# Patient Record
Sex: MALE | Race: WHITE | NOT HISPANIC OR LATINO | Employment: FULL TIME | URBAN - NONMETROPOLITAN AREA
[De-identification: names, ages, dates, MRNs, and addresses within clinical notes are randomized per-mention and may not be internally consistent; named-entity substitution may affect disease eponyms.]

---

## 2019-09-17 ENCOUNTER — OFFICE VISIT CONVERTED (OUTPATIENT)
Dept: CARDIOLOGY | Facility: CLINIC | Age: 59
End: 2019-09-17
Attending: PSYCHIATRY & NEUROLOGY

## 2019-10-15 ENCOUNTER — CONVERSION ENCOUNTER (OUTPATIENT)
Dept: NEUROLOGY | Facility: CLINIC | Age: 59
End: 2019-10-15

## 2019-10-15 ENCOUNTER — OFFICE VISIT CONVERTED (OUTPATIENT)
Dept: NEUROLOGY | Facility: CLINIC | Age: 59
End: 2019-10-15
Attending: PSYCHIATRY & NEUROLOGY

## 2021-05-15 VITALS
BODY MASS INDEX: 32.2 KG/M2 | HEIGHT: 75 IN | HEART RATE: 75 BPM | DIASTOLIC BLOOD PRESSURE: 80 MMHG | WEIGHT: 259 LBS | SYSTOLIC BLOOD PRESSURE: 149 MMHG

## 2021-05-15 VITALS
SYSTOLIC BLOOD PRESSURE: 152 MMHG | WEIGHT: 260 LBS | HEART RATE: 75 BPM | DIASTOLIC BLOOD PRESSURE: 80 MMHG | HEIGHT: 75 IN | BODY MASS INDEX: 32.33 KG/M2

## 2022-06-21 ENCOUNTER — HOSPITAL ENCOUNTER (EMERGENCY)
Dept: HOSPITAL 49 - FER | Age: 62
Discharge: OTHER INSTITUTION | End: 2022-06-21
Payer: COMMERCIAL

## 2022-06-21 DIAGNOSIS — F17.210: ICD-10-CM

## 2022-06-21 DIAGNOSIS — R29.704: ICD-10-CM

## 2022-06-21 DIAGNOSIS — R26.2: ICD-10-CM

## 2022-06-21 DIAGNOSIS — I63.50: Primary | ICD-10-CM

## 2022-06-21 LAB
ALBUMIN SERPL-MCNC: 3.5 G/DL (ref 3.4–5)
ALKALINE PHOSHATASE: 108 U/L (ref 46–116)
ALT SERPL-CCNC: 20 U/L (ref 16–63)
AST: 16 U/L (ref 15–37)
BASOPHIL: 0.4 % (ref 0–2)
BILIRUBIN - TOTAL: 0.4 MG/DL (ref 0.2–1)
BUN SERPL-MCNC: 21 MG/DL (ref 7–18)
BUN/CREAT RATIO (CALC): 24.1 RATIO
CHLORIDE: 102 MMOL/L (ref 98–107)
CO2 (BICARBONATE): 26 MMOL/L (ref 21–32)
CREATININE: 0.87 MG/DL (ref 0.67–1.17)
EOSINOPHIL: 2.6 % (ref 0–5)
GLOBULIN (CALCULATION): 3.1 G/DL
GLUCOSE SERPL-MCNC: 104 MG/DL (ref 74–106)
HCT: 45.4 % (ref 42–52)
HGB BLD-MCNC: 14.8 G/DL (ref 13.2–18)
LYMPHOCYTE: 20.3 % (ref 15–48)
MCH RBC QN AUTO: 28.5 PG (ref 25–31)
MCHC RBC AUTO-ENTMCNC: 32.6 G/DL (ref 32–36)
MCV: 87.3 FL (ref 78–100)
MONOCYTE: 8.5 % (ref 0–12)
MPV: 11.1 FL (ref 6–9.5)
NEUTROPHIL: 67.7 % (ref 41–80)
NRBC: 0
PLT: 134 K/UL (ref 150–400)
POTASSIUM: 4.1 MMOL/L (ref 3.5–5.1)
RBC MORPHOLOGY: NORMAL
RBC: 5.2 M/UL (ref 4.7–6)
RDW: 15.1 % (ref 11.5–14)
TOTAL PROTEIN: 6.6 G/DL (ref 6.4–8.2)
WBC: 8.1 K/UL (ref 4–10.5)

## 2022-07-18 ENCOUNTER — OFFICE VISIT (OUTPATIENT)
Dept: UROLOGY | Facility: CLINIC | Age: 62
End: 2022-07-18

## 2022-07-18 VITALS — BODY MASS INDEX: 30.14 KG/M2 | WEIGHT: 242.4 LBS | HEIGHT: 75 IN | RESPIRATION RATE: 18 BRPM

## 2022-07-18 DIAGNOSIS — R39.15 URINARY URGENCY: Primary | ICD-10-CM

## 2022-07-18 DIAGNOSIS — N52.9 ERECTILE DYSFUNCTION, UNSPECIFIED ERECTILE DYSFUNCTION TYPE: ICD-10-CM

## 2022-07-18 PROBLEM — F17.200 CURRENT SMOKER: Status: ACTIVE | Noted: 2022-01-06

## 2022-07-18 PROBLEM — R42 DIZZINESS: Status: ACTIVE | Noted: 2020-12-13

## 2022-07-18 PROBLEM — Z12.11 SCREENING FOR COLON CANCER: Status: ACTIVE | Noted: 2020-03-13

## 2022-07-18 PROBLEM — E78.5 HYPERLIPIDEMIA: Status: ACTIVE | Noted: 2022-01-06

## 2022-07-18 PROBLEM — I10 BENIGN ESSENTIAL HTN: Status: ACTIVE | Noted: 2022-07-18

## 2022-07-18 PROBLEM — I10 HYPERTENSION: Status: ACTIVE | Noted: 2022-01-06

## 2022-07-18 PROBLEM — R20.0 LEFT ARM NUMBNESS: Status: ACTIVE | Noted: 2020-12-13

## 2022-07-18 PROBLEM — E11.9 TYPE 2 DIABETES MELLITUS: Status: ACTIVE | Noted: 2022-07-18

## 2022-07-18 PROBLEM — Z91.89 AT RISK OF DISEASE: Status: ACTIVE | Noted: 2022-01-06

## 2022-07-18 LAB
BILIRUB BLD-MCNC: NEGATIVE MG/DL
CLARITY, POC: CLEAR
COLOR UR: YELLOW
EXPIRATION DATE: ABNORMAL
GLUCOSE UR STRIP-MCNC: ABNORMAL MG/DL
KETONES UR QL: NEGATIVE
LEUKOCYTE EST, POC: NEGATIVE
Lab: ABNORMAL
NITRITE UR-MCNC: NEGATIVE MG/ML
PH UR: 5 [PH] (ref 5–8)
PROT UR STRIP-MCNC: ABNORMAL MG/DL
RBC # UR STRIP: ABNORMAL /UL
SP GR UR: 1.03 (ref 1–1.03)
URINE VOLUME: 0
UROBILINOGEN UR QL: NORMAL

## 2022-07-18 PROCEDURE — 81003 URINALYSIS AUTO W/O SCOPE: CPT | Performed by: NURSE PRACTITIONER

## 2022-07-18 PROCEDURE — 99203 OFFICE O/P NEW LOW 30 MIN: CPT | Performed by: NURSE PRACTITIONER

## 2022-07-18 PROCEDURE — 51798 US URINE CAPACITY MEASURE: CPT | Performed by: NURSE PRACTITIONER

## 2022-07-18 RX ORDER — NICOTINE 21 MG/24HR
PATCH, TRANSDERMAL 24 HOURS TRANSDERMAL
COMMUNITY
Start: 2022-06-24 | End: 2023-03-17

## 2022-07-18 RX ORDER — ROSUVASTATIN CALCIUM 40 MG/1
TABLET, COATED ORAL
COMMUNITY
End: 2023-03-17

## 2022-07-18 RX ORDER — SILDENAFIL 100 MG/1
50-100 TABLET, FILM COATED ORAL
COMMUNITY
Start: 2022-01-06 | End: 2023-03-17

## 2022-07-18 RX ORDER — TICAGRELOR 90 MG/1
TABLET ORAL
COMMUNITY
Start: 2022-06-23 | End: 2023-03-17

## 2022-07-18 RX ORDER — LISINOPRIL 40 MG/1
TABLET ORAL
COMMUNITY
Start: 2022-07-01 | End: 2023-03-17

## 2022-07-18 RX ORDER — TAMSULOSIN HYDROCHLORIDE 0.4 MG/1
1 CAPSULE ORAL DAILY
Qty: 90 CAPSULE | Refills: 3 | Status: SHIPPED | OUTPATIENT
Start: 2022-07-18 | End: 2023-03-17

## 2022-07-18 RX ORDER — ATORVASTATIN CALCIUM 80 MG/1
80 TABLET, FILM COATED ORAL
COMMUNITY
Start: 2022-06-23 | End: 2023-03-17

## 2022-07-18 RX ORDER — EMPAGLIFLOZIN 25 MG/1
TABLET, FILM COATED ORAL
COMMUNITY
Start: 2022-07-01 | End: 2023-03-17

## 2022-07-18 RX ORDER — SEMAGLUTIDE 1.34 MG/ML
0.25 INJECTION, SOLUTION SUBCUTANEOUS
COMMUNITY
Start: 2022-06-22 | End: 2023-03-17

## 2022-07-18 NOTE — PROGRESS NOTES
Chief Complaint: Urinary Urgency (Pt states he urinates about 4-5 times a day with short notice.)    Subjective         History of Present Illness  Ramon Keating is a 62 y.o. male presents to [facility] to be seen for Urinary urgency.    He states that for about a year he has been with issues with urinary urgency and frequency with leakage.     He is T2 dm with good glycemic control.     Nocturia x 3-4     He will have a strong urge to go and has to really hold his urine to prevent leakage.     He has had episodes of leakage at least once a day.    He voids every 2 hours during the day.     He feels like he has to strain to empty.    Urinary stream is weak.     He also c/o ED and states sildenafil does not work to give him an erection.     He has seen [facility] for peyronie disease and urgency with frequency.     He states that he has tried tadalafil for lower urinary tract symptoms as well as ED and it did not help to alleviate either set of symptoms.    He states he has not had PSA level checked.     Objective     Past Medical History:   Diagnosis Date   • Diabetes mellitus (HCC)    • Erectile dysfunction    • Urinary urgency        Past Surgical History:   Procedure Laterality Date   • APPENDECTOMY           Current Outpatient Medications:   •  atorvastatin (LIPITOR) 80 MG tablet, 80 mg., Disp: , Rfl:   •  Brilinta 90 MG tablet tablet, , Disp: , Rfl:   •  Cyanocobalamin 500 MCG sublingual tablet, 1,000 mcg., Disp: , Rfl:   •  Jardiance 25 MG tablet tablet, , Disp: , Rfl:   •  lisinopril (PRINIVIL,ZESTRIL) 40 MG tablet, , Disp: , Rfl:   •  metFORMIN (GLUCOPHAGE) 1000 MG tablet, , Disp: , Rfl:   •  nicotine (NICODERM CQ) 14 MG/24HR patch, , Disp: , Rfl:   •  rosuvastatin (CRESTOR) 40 MG tablet, rosuvastatin 40 mg oral tablet take 1 tablet (40 mg) by oral route once daily   Active, Disp: , Rfl:   •  Semaglutide,0.25 or 0.5MG/DOS, (Ozempic, 0.25 or 0.5 MG/DOSE,) 2 MG/1.5ML solution  "pen-injector, 0.25 mg., Disp: , Rfl:   •  sildenafil (VIAGRA) 100 MG tablet, Take  mg by mouth., Disp: , Rfl:   •  tamsulosin (FLOMAX) 0.4 MG capsule 24 hr capsule, Take 1 capsule by mouth Daily for 360 days., Disp: 90 capsule, Rfl: 3    No Known Allergies     History reviewed. No pertinent family history.    Social History     Socioeconomic History   • Marital status:    Tobacco Use   • Smoking status: Former Smoker     Packs/day: 1.00     Years: 40.00     Pack years: 40.00     Start date: 1982     Quit date: 2022     Years since quittin.0   • Smokeless tobacco: Never Used   Vaping Use   • Vaping Use: Never used   Substance and Sexual Activity   • Alcohol use: Never   • Drug use: Never   • Sexual activity: Defer       Vital Signs:   Resp 18   Ht 190.5 cm (75\")   Wt 110 kg (242 lb 6.4 oz)   BMI 30.30 kg/m²      Physical Exam     Result Review :   The following data was reviewed by: HUE Robles on 2022:  Results for orders placed or performed in visit on 22   Bladder Scan   Result Value Ref Range    Urine Volume 0    POC Urinalysis Dipstick, Automated    Specimen: Urine   Result Value Ref Range    Color Yellow Yellow, Straw, Dark Yellow, Hetal    Clarity, UA Clear Clear    Specific Gravity  1.030 1.005 - 1.030    pH, Urine 5.0 5.0 - 8.0    Leukocytes Negative Negative    Nitrite, UA Negative Negative    Protein,  mg/dL (A) Negative mg/dL    Glucose, UA >=1000 mg/dL (3+) (A) Negative mg/dL    Ketones, UA Negative Negative    Urobilinogen, UA Normal Normal    Bilirubin Negative Negative    Blood, UA Small (A) Negative    Lot Number [id number]     Expiration Date         Bladder Scan interpretation 2022    Estimation of residual urine via [employer] Bladder Scan  MA/nurse performing: Ninoska CA RN  Residual Urine: 0 ml  Indication: Urinary urgency   Position: Supine  Examination: Incremental scanning of the suprapubic area using 2.0 MHz " transducer using copious amounts of acoustic gel.   Findings: An anechoic area was demonstrated which represented the bladder, with measurement of residual urine as noted. I inspected this myself. In that the residual urine was insignificant, refer to plan for treatment and plan necessary at this time.           Procedures        Assessment and Plan    Diagnoses and all orders for this visit:    1. Urinary urgency (Primary)  -     Bladder Scan  -     POC Urinalysis Dipstick, Automated  -     PSA Diagnostic; Future  -     tamsulosin (FLOMAX) 0.4 MG capsule 24 hr capsule; Take 1 capsule by mouth Daily for 360 days.  Dispense: 90 capsule; Refill: 3    We will have patient get PSA today.    Discussed with the patient that in regards to his Peyronie's disease he can utilize vitamin E capsules 400 international units daily and see if this will help to alleviate his symptoms.  Should he find that he is with painful intercourse or erections in relation to his Peyronie's disease we we will [facility] to [facility] for [facility] evaluation and treatment.    We we will start him on tamsulosin 0.4 mg daily.    Regarding his erectile dysfunction we did discuss use of ICI however he is not interested at this point in time and states that he would not be able to afford this modality.    We did discuss potentially utilizing on-demand tadalafil at a higher dose to gain better control of his erectile dysfunction however at this point time he would like to forego this.    I spent 30 minutes caring for Ramon on this date of service. This time includes time spent by me in the following activities:reviewing tests, obtaining and/or reviewing a separately obtained history, performing a medically appropriate examination and/or evaluation , counseling and educating the patient/family/caregiver, ordering medications, tests, or procedures, and documenting information in the medical record  Follow Up   Return in about 6 weeks (around 8/29/2022)  for f/U BPH .  Patient was given instructions and counseling regarding his condition or for health maintenance advice. Please see specific information pulled into the AVS if appropriate.         This document has been electronically signed by HUE Robles  July 18, 2022 14:39 EDT

## 2022-08-29 ENCOUNTER — OFFICE VISIT (OUTPATIENT)
Dept: UROLOGY | Facility: CLINIC | Age: 62
End: 2022-08-29

## 2022-08-29 VITALS — BODY MASS INDEX: 29.84 KG/M2 | WEIGHT: 240 LBS | HEIGHT: 75 IN | RESPIRATION RATE: 16 BRPM

## 2022-08-29 DIAGNOSIS — R39.15 URINARY URGENCY: Primary | ICD-10-CM

## 2022-08-29 PROBLEM — R29.90 STROKE-LIKE SYMPTOMS: Status: ACTIVE | Noted: 2020-12-13

## 2022-08-29 LAB
BILIRUB BLD-MCNC: NEGATIVE MG/DL
CLARITY, POC: CLEAR
COLOR UR: YELLOW
EXPIRATION DATE: ABNORMAL
GLUCOSE UR STRIP-MCNC: ABNORMAL MG/DL
KETONES UR QL: NEGATIVE
LEUKOCYTE EST, POC: NEGATIVE
Lab: ABNORMAL
NITRITE UR-MCNC: NEGATIVE MG/ML
PH UR: 6.5 [PH] (ref 5–8)
PROT UR STRIP-MCNC: ABNORMAL MG/DL
RBC # UR STRIP: ABNORMAL /UL
SP GR UR: 1.02 (ref 1–1.03)
URINE VOLUME: NORMAL
UROBILINOGEN UR QL: ABNORMAL

## 2022-08-29 PROCEDURE — 81003 URINALYSIS AUTO W/O SCOPE: CPT | Performed by: NURSE PRACTITIONER

## 2022-08-29 PROCEDURE — 51798 US URINE CAPACITY MEASURE: CPT | Performed by: NURSE PRACTITIONER

## 2022-08-29 PROCEDURE — 99212 OFFICE O/P EST SF 10 MIN: CPT | Performed by: NURSE PRACTITIONER

## 2022-08-29 RX ORDER — NICOTINE 21 MG/24HR
PATCH, TRANSDERMAL 24 HOURS TRANSDERMAL
COMMUNITY
Start: 2022-08-22 | End: 2023-03-17

## 2022-08-29 RX ORDER — CLOPIDOGREL BISULFATE 75 MG/1
TABLET ORAL
COMMUNITY
Start: 2022-08-09 | End: 2023-03-17

## 2022-08-29 RX ORDER — DULAGLUTIDE 0.75 MG/.5ML
INJECTION, SOLUTION SUBCUTANEOUS
COMMUNITY
Start: 2022-07-25 | End: 2023-03-17

## 2022-08-29 NOTE — PROGRESS NOTES
Chief Complaint: Benign Prostatic Hypertrophy    Subjective         History of Present Illness  Ramon Keating is a 62 y.o. male presents to [facility] to be seen for Urinary urgency.    At last visit we started patient on tamsulosin 0.4 mg daily only ED as side effects.     His stream is about the same.     Urgency is somewhat better.     The leakage is slightly better.     He states he is no longer straining to void.     PSA was not done after last visit.     Previous:     He states that for about a year he has been with issues with urinary urgency and frequency with leakage.     He is T2 dm with good glycemic control.     Nocturia x 3-4     He will have a strong urge to go and has to really hold his urine to prevent leakage.     He has had episodes of leakage at least once a day.    He voids every 2 hours during the day.     He feels like he has to strain to empty.    Urinary stream is weak.     He also c/o ED and states sildenafil does not work to give him an erection.     [facility] has seen [facility] for peyronie disease and urgency with frequency.     He states that he has tried tadalafil for lower urinary tract symptoms as well as ED and it did not help to alleviate either set of symptoms.    He states he has not had PSA level checked.     Objective     Past Medical History:   Diagnosis Date   • Diabetes mellitus (HCC)    • Erectile dysfunction    • Urinary urgency        Past Surgical History:   Procedure Laterality Date   • APPENDECTOMY           Current Outpatient Medications:   •  atorvastatin (LIPITOR) 80 MG tablet, 80 mg., Disp: , Rfl:   •  Brilinta 90 MG tablet tablet, , Disp: , Rfl:   •  Cyanocobalamin 500 MCG sublingual tablet, 1,000 mcg., Disp: , Rfl:   •  Jardiance 25 MG tablet tablet, , Disp: , Rfl:   •  lisinopril (PRINIVIL,ZESTRIL) 40 MG tablet, , Disp: , Rfl:   •  metFORMIN (GLUCOPHAGE) 1000 MG tablet, , Disp: , Rfl:   •  nicotine (NICODERM CQ) 14 MG/24HR patch, , Disp: ,  "Rfl:   •  nicotine (NICODERM CQ) 21 MG/24HR patch, , Disp: , Rfl:   •  rosuvastatin (CRESTOR) 40 MG tablet, rosuvastatin 40 mg oral tablet take 1 tablet (40 mg) by oral route once daily   Active, Disp: , Rfl:   •  Semaglutide,0.25 or 0.5MG/DOS, (Ozempic, 0.25 or 0.5 MG/DOSE,) 2 MG/1.5ML solution pen-injector, 0.25 mg., Disp: , Rfl:   •  sildenafil (VIAGRA) 100 MG tablet, Take  mg by mouth., Disp: , Rfl:   •  tamsulosin (FLOMAX) 0.4 MG capsule 24 hr capsule, Take 1 capsule by mouth Daily for 360 days., Disp: 90 capsule, Rfl: 3  •  Trulicity 0.75 MG/0.5ML solution pen-injector, , Disp: , Rfl:   •  clopidogrel (PLAVIX) 75 MG tablet, , Disp: , Rfl:     No Known Allergies     History reviewed. No pertinent family history.    Social History     Socioeconomic History   • Marital status:    Tobacco Use   • Smoking status: Former Smoker     Packs/day: 1.00     Years: 40.00     Pack years: 40.00     Start date: 1982     Quit date: 2022     Years since quittin.1   • Smokeless tobacco: Never Used   Vaping Use   • Vaping Use: Never used   Substance and Sexual Activity   • Alcohol use: Never   • Drug use: Never   • Sexual activity: Defer       Vital Signs:   Resp 16   Ht 190.5 cm (75\")   Wt 109 kg (240 lb)   BMI 30.00 kg/m²      Physical Exam     Result Review :   The following data was reviewed by: HUE Robles on 2022:  Results for orders placed or performed in visit on 22   Bladder Scan   Result Value Ref Range    Urine Volume 21ml    POC Urinalysis Dipstick, Automated    Specimen: Urine   Result Value Ref Range    Color Yellow Yellow, Straw, Dark Yellow, Hetal    Clarity, UA Clear Clear    Specific Gravity  1.025 1.005 - 1.030    pH, Urine 6.5 5.0 - 8.0    Leukocytes Negative Negative    Nitrite, UA Negative Negative    Protein,  mg/dL (A) Negative mg/dL    Glucose,  mg/dL (A) Negative mg/dL    Ketones, UA Negative Negative    Urobilinogen, UA 0.2 E.U./dL " Normal, 0.2 E.U./dL    Bilirubin Negative Negative    Blood, UA Trace (A) Negative    Lot Number [id number]     Expiration Date 52,023        Bladder Scan interpretation 08/29/2022    Estimation of residual urine via [employer] Bladder Scan  MA/nurse performing: Libia MONTANO MA   Residual Urine:21 ml  Indication: Urinary urgency   Position: Supine  Examination: Incremental scanning of the suprapubic area using 2.0 MHz transducer using copious amounts of acoustic gel.   Findings: An anechoic area was demonstrated which represented the bladder, with measurement of residual urine as noted. I inspected this myself. In that the residual urine was insignificant, refer to plan for treatment and plan necessary at this time.           Procedures        Assessment and Plan    Diagnoses and all orders for this visit:    1. Urinary urgency (Primary)  -     Bladder Scan  -     POC Urinalysis Dipstick, Automated    We will have patient get PSA today.    We will have him continue tamsulosin 0.4 mg q day.     I spent 10 minutes caring for Ramon on this date of service. This time includes time spent by me in the following activities:reviewing tests, obtaining and/or reviewing a separately obtained history, performing a medically appropriate examination and/or evaluation , counseling and educating the patient/family/caregiver, ordering medications, tests, or procedures, and documenting information in the medical record  Follow Up   Return in about 6 weeks (around 10/10/2022) for With Dr. Chowdhury For f/u BPH .  Patient was given instructions and counseling regarding his condition or for health maintenance advice. Please see specific information pulled into the AVS if appropriate.         This document has been electronically signed by HUE Robles  August 29, 2022 09:28 EDT

## 2022-08-30 ENCOUNTER — TELEPHONE (OUTPATIENT)
Dept: UROLOGY | Facility: CLINIC | Age: 62
End: 2022-08-30

## 2022-08-30 NOTE — TELEPHONE ENCOUNTER
PER ANJEL PT NEEDS AN APPT WITH DR PONEC IN 6WKS, PER LETICIA PT CAN BE SCHEDULED 10/17 AT 2:30PM IN [facility], CALLED AND SPOKE TO PT AND OFFERED HIM THIS APPT, PT STATED HE WAS NOT COMING TO [facility], OFFERED HIM THE ONLY APPT DR PONCE HAS IN [facility] FOR 12/5 AT 11:15 PT STATED HE COULD NOT DO THAT BECAUSE HE HAS TO GO TO WORK AND HE STATED THAT HE WOULD JUST HAVE TO FIND ANOTHER UROLOGIST, SPOKE TO ANJEL AND MADE HER AWARE, SHE SAID TO SEND PT A LETTER IN 6WKS TO MAKE AN APPT.

## 2023-03-17 ENCOUNTER — APPOINTMENT (OUTPATIENT)
Dept: CT IMAGING | Facility: HOSPITAL | Age: 63
DRG: 65 | End: 2023-03-17
Payer: COMMERCIAL

## 2023-03-17 ENCOUNTER — APPOINTMENT (OUTPATIENT)
Dept: GENERAL RADIOLOGY | Facility: HOSPITAL | Age: 63
DRG: 65 | End: 2023-03-17
Payer: COMMERCIAL

## 2023-03-17 ENCOUNTER — HOSPITAL ENCOUNTER (INPATIENT)
Facility: HOSPITAL | Age: 63
LOS: 3 days | Discharge: HOME | DRG: 65 | End: 2023-03-20
Attending: EMERGENCY MEDICINE | Admitting: HOSPITALIST
Payer: COMMERCIAL

## 2023-03-17 DIAGNOSIS — Z78.9 DECREASED ACTIVITIES OF DAILY LIVING (ADL): ICD-10-CM

## 2023-03-17 DIAGNOSIS — I50.22 CHRONIC HFREF (HEART FAILURE WITH REDUCED EJECTION FRACTION): ICD-10-CM

## 2023-03-17 DIAGNOSIS — I63.9 CEREBROVASCULAR ACCIDENT (CVA), UNSPECIFIED MECHANISM: Primary | ICD-10-CM

## 2023-03-17 LAB
ABO GROUP BLD: NORMAL
ABO GROUP BLD: NORMAL
ALBUMIN SERPL-MCNC: 4.6 G/DL (ref 3.5–5.2)
ALBUMIN/GLOB SERPL: 1.6 G/DL
ALP SERPL-CCNC: 140 U/L (ref 39–117)
ALT SERPL W P-5'-P-CCNC: 28 U/L (ref 1–41)
ANION GAP SERPL CALCULATED.3IONS-SCNC: 11.7 MMOL/L (ref 5–15)
APTT PPP: 30.5 SECONDS (ref 24.2–34.2)
AST SERPL-CCNC: 25 U/L (ref 1–40)
BACTERIA UR QL AUTO: ABNORMAL /HPF
BASOPHILS # BLD AUTO: 0.04 10*3/MM3 (ref 0–0.2)
BASOPHILS NFR BLD AUTO: 0.6 % (ref 0–1.5)
BILIRUB SERPL-MCNC: 0.5 MG/DL (ref 0–1.2)
BILIRUB UR QL STRIP: NEGATIVE
BLD GP AB SCN SERPL QL: NEGATIVE
BUN SERPL-MCNC: 19 MG/DL (ref 8–23)
BUN/CREAT SERPL: 15.6 (ref 7–25)
CALCIUM SPEC-SCNC: 9.7 MG/DL (ref 8.6–10.5)
CHLORIDE SERPL-SCNC: 102 MMOL/L (ref 98–107)
CLARITY UR: CLEAR
CO2 SERPL-SCNC: 26.3 MMOL/L (ref 22–29)
COLOR UR: YELLOW
CREAT SERPL-MCNC: 1.22 MG/DL (ref 0.76–1.27)
DEPRECATED RDW RBC AUTO: 42.5 FL (ref 37–54)
EGFRCR SERPLBLD CKD-EPI 2021: 67 ML/MIN/1.73
EOSINOPHIL # BLD AUTO: 0.12 10*3/MM3 (ref 0–0.4)
EOSINOPHIL NFR BLD AUTO: 1.9 % (ref 0.3–6.2)
ERYTHROCYTE [DISTWIDTH] IN BLOOD BY AUTOMATED COUNT: 13.7 % (ref 12.3–15.4)
GLOBULIN UR ELPH-MCNC: 2.9 GM/DL
GLUCOSE BLDC GLUCOMTR-MCNC: 155 MG/DL (ref 70–99)
GLUCOSE BLDC GLUCOMTR-MCNC: 159 MG/DL (ref 70–99)
GLUCOSE SERPL-MCNC: 159 MG/DL (ref 65–99)
GLUCOSE UR STRIP-MCNC: ABNORMAL MG/DL
HCT VFR BLD AUTO: 45.6 % (ref 37.5–51)
HGB BLD-MCNC: 15.5 G/DL (ref 13–17.7)
HGB UR QL STRIP.AUTO: NEGATIVE
HOLD SPECIMEN: NORMAL
HOLD SPECIMEN: NORMAL
HYALINE CASTS UR QL AUTO: ABNORMAL /LPF
IMM GRANULOCYTES # BLD AUTO: 0.02 10*3/MM3 (ref 0–0.05)
IMM GRANULOCYTES NFR BLD AUTO: 0.3 % (ref 0–0.5)
INR PPP: 0.96 (ref 0.86–1.15)
KETONES UR QL STRIP: NEGATIVE
LEUKOCYTE ESTERASE UR QL STRIP.AUTO: NEGATIVE
LYMPHOCYTES # BLD AUTO: 1.52 10*3/MM3 (ref 0.7–3.1)
LYMPHOCYTES NFR BLD AUTO: 23.5 % (ref 19.6–45.3)
MCH RBC QN AUTO: 28.8 PG (ref 26.6–33)
MCHC RBC AUTO-ENTMCNC: 34 G/DL (ref 31.5–35.7)
MCV RBC AUTO: 84.8 FL (ref 79–97)
MONOCYTES # BLD AUTO: 0.68 10*3/MM3 (ref 0.1–0.9)
MONOCYTES NFR BLD AUTO: 10.5 % (ref 5–12)
NEUTROPHILS NFR BLD AUTO: 4.09 10*3/MM3 (ref 1.7–7)
NEUTROPHILS NFR BLD AUTO: 63.2 % (ref 42.7–76)
NITRITE UR QL STRIP: NEGATIVE
NRBC BLD AUTO-RTO: 0 /100 WBC (ref 0–0.2)
PH UR STRIP.AUTO: 5.5 [PH] (ref 5–8)
PLATELET # BLD AUTO: 212 10*3/MM3 (ref 140–450)
PMV BLD AUTO: 10.2 FL (ref 6–12)
POTASSIUM SERPL-SCNC: 3.9 MMOL/L (ref 3.5–5.2)
PROT SERPL-MCNC: 7.5 G/DL (ref 6–8.5)
PROT UR QL STRIP: ABNORMAL
PROTHROMBIN TIME: 12.9 SECONDS (ref 11.8–14.9)
RBC # BLD AUTO: 5.38 10*6/MM3 (ref 4.14–5.8)
RBC # UR STRIP: ABNORMAL /HPF
REF LAB TEST METHOD: ABNORMAL
RH BLD: POSITIVE
RH BLD: POSITIVE
SODIUM SERPL-SCNC: 140 MMOL/L (ref 136–145)
SP GR UR STRIP: >1.03 (ref 1–1.03)
SQUAMOUS #/AREA URNS HPF: ABNORMAL /HPF
T&S EXPIRATION DATE: NORMAL
TROPONIN T SERPL HS-MCNC: 23 NG/L
UROBILINOGEN UR QL STRIP: ABNORMAL
WBC # UR STRIP: ABNORMAL /HPF
WBC NRBC COR # BLD: 6.47 10*3/MM3 (ref 3.4–10.8)
WHOLE BLOOD HOLD COAG: NORMAL
WHOLE BLOOD HOLD SPECIMEN: NORMAL

## 2023-03-17 PROCEDURE — 70450 CT HEAD/BRAIN W/O DYE: CPT

## 2023-03-17 PROCEDURE — 86901 BLOOD TYPING SEROLOGIC RH(D): CPT | Performed by: EMERGENCY MEDICINE

## 2023-03-17 PROCEDURE — 4A03X5D MEASUREMENT OF ARTERIAL FLOW, INTRACRANIAL, EXTERNAL APPROACH: ICD-10-PCS | Performed by: RADIOLOGY

## 2023-03-17 PROCEDURE — 85730 THROMBOPLASTIN TIME PARTIAL: CPT | Performed by: EMERGENCY MEDICINE

## 2023-03-17 PROCEDURE — G0378 HOSPITAL OBSERVATION PER HR: HCPCS

## 2023-03-17 PROCEDURE — 86900 BLOOD TYPING SEROLOGIC ABO: CPT | Performed by: EMERGENCY MEDICINE

## 2023-03-17 PROCEDURE — 86850 RBC ANTIBODY SCREEN: CPT | Performed by: EMERGENCY MEDICINE

## 2023-03-17 PROCEDURE — 71045 X-RAY EXAM CHEST 1 VIEW: CPT

## 2023-03-17 PROCEDURE — 99285 EMERGENCY DEPT VISIT HI MDM: CPT

## 2023-03-17 PROCEDURE — 99233 SBSQ HOSP IP/OBS HIGH 50: CPT | Performed by: PSYCHIATRY & NEUROLOGY

## 2023-03-17 PROCEDURE — 85025 COMPLETE CBC W/AUTO DIFF WBC: CPT | Performed by: EMERGENCY MEDICINE

## 2023-03-17 PROCEDURE — 94799 UNLISTED PULMONARY SVC/PX: CPT

## 2023-03-17 PROCEDURE — 80053 COMPREHEN METABOLIC PANEL: CPT | Performed by: EMERGENCY MEDICINE

## 2023-03-17 PROCEDURE — 82962 GLUCOSE BLOOD TEST: CPT

## 2023-03-17 PROCEDURE — 25510000001 IOPAMIDOL PER 1 ML: Performed by: EMERGENCY MEDICINE

## 2023-03-17 PROCEDURE — 70496 CT ANGIOGRAPHY HEAD: CPT

## 2023-03-17 PROCEDURE — 70498 CT ANGIOGRAPHY NECK: CPT

## 2023-03-17 PROCEDURE — 81001 URINALYSIS AUTO W/SCOPE: CPT | Performed by: EMERGENCY MEDICINE

## 2023-03-17 PROCEDURE — 93005 ELECTROCARDIOGRAM TRACING: CPT | Performed by: EMERGENCY MEDICINE

## 2023-03-17 PROCEDURE — 93010 ELECTROCARDIOGRAM REPORT: CPT | Performed by: INTERNAL MEDICINE

## 2023-03-17 PROCEDURE — 36415 COLL VENOUS BLD VENIPUNCTURE: CPT | Performed by: EMERGENCY MEDICINE

## 2023-03-17 PROCEDURE — 85610 PROTHROMBIN TIME: CPT | Performed by: EMERGENCY MEDICINE

## 2023-03-17 PROCEDURE — 0042T HC CT CEREBRAL PERFUSION W/WO CONTRAST: CPT

## 2023-03-17 PROCEDURE — 86901 BLOOD TYPING SEROLOGIC RH(D): CPT

## 2023-03-17 PROCEDURE — 84484 ASSAY OF TROPONIN QUANT: CPT | Performed by: EMERGENCY MEDICINE

## 2023-03-17 PROCEDURE — 86900 BLOOD TYPING SEROLOGIC ABO: CPT

## 2023-03-17 PROCEDURE — 99223 1ST HOSP IP/OBS HIGH 75: CPT | Performed by: HOSPITALIST

## 2023-03-17 RX ORDER — ATORVASTATIN CALCIUM 40 MG/1
80 TABLET, FILM COATED ORAL NIGHTLY
Status: DISCONTINUED | OUTPATIENT
Start: 2023-03-17 | End: 2023-03-20 | Stop reason: HOSPADM

## 2023-03-17 RX ORDER — BISACODYL 10 MG
10 SUPPOSITORY, RECTAL RECTAL DAILY PRN
Status: DISCONTINUED | OUTPATIENT
Start: 2023-03-17 | End: 2023-03-20 | Stop reason: HOSPADM

## 2023-03-17 RX ORDER — SODIUM CHLORIDE 9 MG/ML
40 INJECTION, SOLUTION INTRAVENOUS AS NEEDED
Status: DISCONTINUED | OUTPATIENT
Start: 2023-03-17 | End: 2023-03-20 | Stop reason: HOSPADM

## 2023-03-17 RX ORDER — SODIUM CHLORIDE 0.9 % (FLUSH) 0.9 %
10 SYRINGE (ML) INJECTION AS NEEDED
Status: DISCONTINUED | OUTPATIENT
Start: 2023-03-17 | End: 2023-03-20 | Stop reason: HOSPADM

## 2023-03-17 RX ORDER — DEXTROSE MONOHYDRATE 25 G/50ML
25 INJECTION, SOLUTION INTRAVENOUS
Status: DISCONTINUED | OUTPATIENT
Start: 2023-03-17 | End: 2023-03-20 | Stop reason: HOSPADM

## 2023-03-17 RX ORDER — ACETAMINOPHEN 325 MG/1
650 TABLET ORAL EVERY 4 HOURS PRN
Status: DISCONTINUED | OUTPATIENT
Start: 2023-03-17 | End: 2023-03-20 | Stop reason: HOSPADM

## 2023-03-17 RX ORDER — ASPIRIN 81 MG/1
324 TABLET, CHEWABLE ORAL ONCE
Status: COMPLETED | OUTPATIENT
Start: 2023-03-17 | End: 2023-03-17

## 2023-03-17 RX ORDER — CHOLECALCIFEROL (VITAMIN D3) 125 MCG
5 CAPSULE ORAL NIGHTLY PRN
Status: DISCONTINUED | OUTPATIENT
Start: 2023-03-17 | End: 2023-03-20 | Stop reason: HOSPADM

## 2023-03-17 RX ORDER — BISACODYL 5 MG/1
5 TABLET, DELAYED RELEASE ORAL DAILY PRN
Status: DISCONTINUED | OUTPATIENT
Start: 2023-03-17 | End: 2023-03-20 | Stop reason: HOSPADM

## 2023-03-17 RX ORDER — POLYETHYLENE GLYCOL 3350 17 G/17G
17 POWDER, FOR SOLUTION ORAL DAILY PRN
Status: DISCONTINUED | OUTPATIENT
Start: 2023-03-17 | End: 2023-03-20 | Stop reason: HOSPADM

## 2023-03-17 RX ORDER — ASPIRIN 300 MG/1
300 SUPPOSITORY RECTAL DAILY
Status: DISCONTINUED | OUTPATIENT
Start: 2023-03-18 | End: 2023-03-20 | Stop reason: HOSPADM

## 2023-03-17 RX ORDER — SODIUM CHLORIDE 0.9 % (FLUSH) 0.9 %
10 SYRINGE (ML) INJECTION EVERY 12 HOURS SCHEDULED
Status: DISCONTINUED | OUTPATIENT
Start: 2023-03-17 | End: 2023-03-20 | Stop reason: HOSPADM

## 2023-03-17 RX ORDER — NICOTINE 21 MG/24HR
1 PATCH, TRANSDERMAL 24 HOURS TRANSDERMAL DAILY
Status: DISCONTINUED | OUTPATIENT
Start: 2023-03-17 | End: 2023-03-20 | Stop reason: HOSPADM

## 2023-03-17 RX ORDER — INSULIN LISPRO 100 [IU]/ML
2-7 INJECTION, SOLUTION INTRAVENOUS; SUBCUTANEOUS
Status: DISCONTINUED | OUTPATIENT
Start: 2023-03-18 | End: 2023-03-20 | Stop reason: HOSPADM

## 2023-03-17 RX ORDER — LABETALOL HYDROCHLORIDE 5 MG/ML
10 INJECTION, SOLUTION INTRAVENOUS ONCE
Status: COMPLETED | OUTPATIENT
Start: 2023-03-17 | End: 2023-03-17

## 2023-03-17 RX ORDER — NICOTINE POLACRILEX 4 MG
15 LOZENGE BUCCAL
Status: DISCONTINUED | OUTPATIENT
Start: 2023-03-17 | End: 2023-03-20 | Stop reason: HOSPADM

## 2023-03-17 RX ORDER — TAMSULOSIN HYDROCHLORIDE 0.4 MG/1
0.4 CAPSULE ORAL DAILY
Status: DISCONTINUED | OUTPATIENT
Start: 2023-03-18 | End: 2023-03-20 | Stop reason: HOSPADM

## 2023-03-17 RX ORDER — ASPIRIN 81 MG/1
81 TABLET, CHEWABLE ORAL DAILY
Status: DISCONTINUED | OUTPATIENT
Start: 2023-03-18 | End: 2023-03-20 | Stop reason: HOSPADM

## 2023-03-17 RX ORDER — LABETALOL HYDROCHLORIDE 5 MG/ML
20 INJECTION, SOLUTION INTRAVENOUS ONCE
Status: DISCONTINUED | OUTPATIENT
Start: 2023-03-17 | End: 2023-03-17

## 2023-03-17 RX ORDER — ONDANSETRON 4 MG/1
4 TABLET, FILM COATED ORAL EVERY 6 HOURS PRN
Status: DISCONTINUED | OUTPATIENT
Start: 2023-03-17 | End: 2023-03-20 | Stop reason: HOSPADM

## 2023-03-17 RX ORDER — AMOXICILLIN 250 MG
2 CAPSULE ORAL 2 TIMES DAILY
Status: DISCONTINUED | OUTPATIENT
Start: 2023-03-17 | End: 2023-03-20 | Stop reason: HOSPADM

## 2023-03-17 RX ORDER — ONDANSETRON 2 MG/ML
4 INJECTION INTRAMUSCULAR; INTRAVENOUS EVERY 6 HOURS PRN
Status: DISCONTINUED | OUTPATIENT
Start: 2023-03-17 | End: 2023-03-20 | Stop reason: HOSPADM

## 2023-03-17 RX ADMIN — TICAGRELOR 60 MG: 60 TABLET ORAL at 21:06

## 2023-03-17 RX ADMIN — LABETALOL HYDROCHLORIDE 10 MG: 5 INJECTION, SOLUTION INTRAVENOUS at 15:41

## 2023-03-17 RX ADMIN — ATORVASTATIN CALCIUM 80 MG: 40 TABLET, FILM COATED ORAL at 21:06

## 2023-03-17 RX ADMIN — Medication 10 ML: at 21:06

## 2023-03-17 RX ADMIN — ASPIRIN 81 MG 324 MG: 81 TABLET ORAL at 15:26

## 2023-03-17 RX ADMIN — IOPAMIDOL 150 ML: 755 INJECTION, SOLUTION INTRAVENOUS at 14:06

## 2023-03-17 NOTE — H&P
[facility] HISTORY AND PHYSICAL  Date: 3/17/2023   Patient Name: Lester Keating  : 1960  MRN: [id number]  Primary Care Physician:  Carlos Townsend APRN  Date of admission: 3/17/2023    Subjective   Subjective     Chief Complaint: Left-sided weakness x1 day    HPI:    Lester Keating is a 62 y.o. male with medical history significant for type 2 diabetes, primary hypertension, history of TIA; presents with left-sided weakness x1 day.  Patient reports that an hour before coming to the hospital he started to have left-sided weakness to the point that he was unable to ambulate.  Patient also noted left-sided numbness and weakness of his face with facial droop.  Patient states he has had similar presentations in the past.  Patient states that he had been diagnosed with TIAs in the past.  Patient states he typically will have left-sided weakness with left facial droop.  Patient states he is currently on Brilinta secondary to TIAs in the past.  Patient states he had been on Plavix and was changed to Brilinta after having 1 of these TIAs.  Patient does report having runny nose, itchy eyes and sore throat which he states is normal for him.  Patient has also reported having a little cough and some shortness of breath.  Patient reports having chronic back pain and chronic neck pain.  No fever, no chills, no nausea, no vomiting, no constipation, no diarrhea, no abdominal pain, no chest pain, no lower extremity swelling, no UTI type symptoms, no confusion.      Personal History     Past Medical History:  Past Medical History:   Diagnosis Date   • Diabetes mellitus (HCC)    • Erectile dysfunction    • Urinary urgency    · Primary hypertension  · History of TIAs    Past Surgical History:  Past Surgical History:   Procedure Laterality Date   • APPENDECTOMY          Family History:   History reviewed. No pertinent family history.     Social History:   Social History     Socioeconomic History   • Marital status:     Tobacco Use   • Smoking status: Former     Packs/day: 1.00     Years: 40.00     Pack years: 40.00     Types: Cigarettes     Start date: 1982     Quit date: 2022     Years since quittin.7   • Smokeless tobacco: Never   Vaping Use   • Vaping Use: Never used   Substance and Sexual Activity   • Alcohol use: Never   • Drug use: Never   • Sexual activity: Defer        Home Medications:  Prior to Admission medications    Medication Sig Start Date End Date Taking? Authorizing Provider   atorvastatin (LIPITOR) 80 MG tablet Take 1 tablet by mouth Daily. 3/16/23  Yes    Cyanocobalamin (B-12) 500 MCG sublingual tablet Place 1,000 m under the tongue Daily. 3/1/23  Yes    empagliflozin (Jardiance) 25 MG tablet tablet Take 1 tablet by mouth Daily. 3/16/23  Yes    lisinopril (PRINIVIL,ZESTRIL) 40 MG tablet Take 1 tablet by mouth Daily. 3/16/23  Yes    metFORMIN (GLUCOPHAGE) 1000 MG tablet Take 1 tablet by mouth Daily With Breakfast & Dinner. 3/16/23  Yes    tamsulosin (FLOMAX) 0.4 MG capsule 24 hr capsule Take 1 capsule by mouth Daily. 3/16/23  Yes    ticagrelor (Brilinta) 60 MG tablet tablet Take 1 tablet by mouth 2 (Two) Times a Day. 3/16/23  Yes    lisinopril (PRINIVIL,ZESTRIL) 20 MG tablet Take 1 tablet by mouth Daily. 3/1/23 3/17/23 Yes    Dulaglutide (Trulicity) 0.75 MG/0.5ML solution pen-injector Inject 0.75 mg under the skin into the appropriate area as directed Every 7 (Seven) Days.  Patient taking differently: Inject 0.75 mg under the skin into the appropriate area as directed Every 7 (Seven) Days. MONDAY 3/16/23      nicotine (NICODERM CQ) 21 MG/24HR patch Place 1 patch on the skin as directed by provider Daily. 3/1/23      atorvastatin (LIPITOR) 80 MG tablet 80 mg. 6/23/22 3/17/23  ProviderNarcisa MD   Brilinta 90 MG tablet tablet  6/23/22 3/17/23  ProviderNarcisa MD   clopidogrel (PLAVIX) 75 MG tablet  8/9/22 3/17/23  Provider, Historical, MD   Cyanocobalamin 500 MCG sublingual  tablet 1,000 mcg. 6/23/22 3/17/23  Narcisa Wilson MD   Jardiance 25 MG tablet tablet  7/1/22 3/17/23  Narcisa Wilson MD   lisinopril (PRINIVIL,ZESTRIL) 40 MG tablet  7/1/22 3/17/23  Narcisa Wilson MD   metFORMIN (GLUCOPHAGE) 1000 MG tablet  5/25/22 3/17/23  Narcisa Wilson MD   nicotine (NICODERM CQ) 14 MG/24HR patch  6/24/22 3/17/23  Narcisa Wilson MD   nicotine (NICODERM CQ) 21 MG/24HR patch  8/22/22 3/17/23  Narcisa Wilson MD   rosuvastatin (CRESTOR) 40 MG tablet rosuvastatin 40 mg oral tablet take 1 tablet (40 mg) by oral route once daily   Active  3/17/23  Narcisa Wilson MD   Semaglutide,0.25 or 0.5MG/DOS, (Ozempic, 0.25 or 0.5 MG/DOSE,) 2 MG/1.5ML solution pen-injector 0.25 mg. 6/22/22 3/17/23  Narcisa Wilson MD   sildenafil (VIAGRA) 100 MG tablet Take  mg by mouth. 1/6/22 3/17/23  Narcisa Wilson MD   tamsulosin (FLOMAX) 0.4 MG capsule 24 hr capsule Take 1 capsule by mouth Daily for 360 days. 7/18/22 3/17/23  Lashay Leonardo APRN   Trulicity 0.75 MG/0.5ML solution pen-injector  7/25/22 3/17/23  Narcisa Wilson MD        Allergies:  No Known Allergies    Review of Systems  Review of Systems   Constitutional: Positive for activity change. Negative for appetite change, chills, fatigue and fever.   HENT: Positive for rhinorrhea and sore throat. Negative for ear pain, postnasal drip, sinus pressure, sinus pain and sneezing.    Eyes: Positive for itching.   Respiratory: Positive for cough and shortness of breath. Negative for wheezing.    Cardiovascular: Negative for chest pain and leg swelling.   Gastrointestinal: Negative for abdominal pain, constipation, diarrhea, nausea and vomiting.   Endocrine: Negative for polydipsia and polyuria.   Genitourinary: Negative for decreased urine volume, difficulty urinating, dysuria, flank pain, frequency and urgency.   Musculoskeletal: Positive for back pain, gait problem, myalgias and neck pain.  Negative for arthralgias.   Neurological: Positive for dizziness, facial asymmetry, weakness, numbness and headaches. Negative for seizures and syncope.   Psychiatric/Behavioral: Negative for agitation and confusion.           Objective   Objective     Vitals:   Temp:  [97.9 °F (36.6 °C)] 97.9 °F (36.6 °C)  Heart Rate:  [70-85] 71  Resp:  [18] 18  BP: (164-214)/() 164/77    Physical Exam   Physical Exam  Constitutional:       Appearance: Normal appearance.   HENT:      Head: Normocephalic and atraumatic.      Right Ear: External ear normal.      Left Ear: External ear normal.      Nose: Nose normal.      Mouth/Throat:      Pharynx: Oropharynx is clear.   Eyes:      Extraocular Movements: Extraocular movements intact.      Pupils: Pupils are equal, round, and reactive to light.   Cardiovascular:      Rate and Rhythm: Normal rate and regular rhythm.      Pulses: Normal pulses.      Heart sounds: Normal heart sounds. No murmur heard.    No friction rub. No gallop.   Pulmonary:      Effort: Pulmonary effort is normal. No respiratory distress.      Breath sounds: Normal breath sounds. No wheezing, rhonchi or rales.   Abdominal:      General: Bowel sounds are normal. There is no distension.      Tenderness: There is no abdominal tenderness.   Musculoskeletal:         General: No swelling. Normal range of motion.      Cervical back: Normal range of motion and neck supple.   Skin:     General: Skin is warm.      Capillary Refill: Capillary refill takes less than 2 seconds.   Neurological:      General: No focal deficit present.      Mental Status: He is alert.      Cranial Nerves: No cranial nerve deficit.          Result Review    Result Review:  Glucose   Date Value Ref Range Status   03/17/2023 159 (H) 65 - 99 mg/dL Final     BUN   Date Value Ref Range Status   03/17/2023 19 8 - 23 mg/dL Final     Creatinine   Date Value Ref Range Status   03/17/2023 1.22 0.76 - 1.27 mg/dL Final     Sodium   Date Value Ref Range  Status   03/17/2023 140 136 - 145 mmol/L Final     Potassium   Date Value Ref Range Status   03/17/2023 3.9 3.5 - 5.2 mmol/L Final     Chloride   Date Value Ref Range Status   03/17/2023 102 98 - 107 mmol/L Final     CO2   Date Value Ref Range Status   03/17/2023 26.3 22.0 - 29.0 mmol/L Final     Calcium   Date Value Ref Range Status   03/17/2023 9.7 8.6 - 10.5 mg/dL Final     Total Protein   Date Value Ref Range Status   03/17/2023 7.5 6.0 - 8.5 g/dL Final     Albumin   Date Value Ref Range Status   03/17/2023 4.6 3.5 - 5.2 g/dL Final     ALT (SGPT)   Date Value Ref Range Status   03/17/2023 28 1 - 41 U/L Final     AST (SGOT)   Date Value Ref Range Status   03/17/2023 25 1 - 40 U/L Final     Alkaline Phosphatase   Date Value Ref Range Status   03/17/2023 140 (H) 39 - 117 U/L Final     Total Bilirubin   Date Value Ref Range Status   03/17/2023 0.5 0.0 - 1.2 mg/dL Final     BUN/Creatinine Ratio   Date Value Ref Range Status   03/17/2023 15.6 7.0 - 25.0 Final     Anion Gap   Date Value Ref Range Status   03/17/2023 11.7 5.0 - 15.0 mmol/L Final      WBC   Date Value Ref Range Status   03/17/2023 6.47 3.40 - 10.80 10*3/mm3 Final     RBC   Date Value Ref Range Status   03/17/2023 5.38 4.14 - 5.80 10*6/mm3 Final     Hemoglobin   Date Value Ref Range Status   03/17/2023 15.5 13.0 - 17.7 g/dL Final     Hematocrit   Date Value Ref Range Status   03/17/2023 45.6 37.5 - 51.0 % Final     MCV   Date Value Ref Range Status   03/17/2023 84.8 79.0 - 97.0 fL Final     MCH   Date Value Ref Range Status   03/17/2023 28.8 26.6 - 33.0 pg Final     MCHC   Date Value Ref Range Status   03/17/2023 34.0 31.5 - 35.7 g/dL Final     RDW   Date Value Ref Range Status   03/17/2023 13.7 12.3 - 15.4 % Final     RDW-SD   Date Value Ref Range Status   03/17/2023 42.5 37.0 - 54.0 fl Final     MPV   Date Value Ref Range Status   03/17/2023 10.2 6.0 - 12.0 fL Final     Platelets   Date Value Ref Range Status   03/17/2023 212 140 - 450 10*3/mm3 Final      Neutrophil %   Date Value Ref Range Status   03/17/2023 63.2 42.7 - 76.0 % Final     Lymphocyte %   Date Value Ref Range Status   03/17/2023 23.5 19.6 - 45.3 % Final     Monocyte %   Date Value Ref Range Status   03/17/2023 10.5 5.0 - 12.0 % Final     Eosinophil %   Date Value Ref Range Status   03/17/2023 1.9 0.3 - 6.2 % Final     Basophil %   Date Value Ref Range Status   03/17/2023 0.6 0.0 - 1.5 % Final     Immature Grans %   Date Value Ref Range Status   03/17/2023 0.3 0.0 - 0.5 % Final     Neutrophils, Absolute   Date Value Ref Range Status   03/17/2023 4.09 1.70 - 7.00 10*3/mm3 Final     Lymphocytes, Absolute   Date Value Ref Range Status   03/17/2023 1.52 0.70 - 3.10 10*3/mm3 Final     Monocytes, Absolute   Date Value Ref Range Status   03/17/2023 0.68 0.10 - 0.90 10*3/mm3 Final     Eosinophils, Absolute   Date Value Ref Range Status   03/17/2023 0.12 0.00 - 0.40 10*3/mm3 Final     Basophils, Absolute   Date Value Ref Range Status   03/17/2023 0.04 0.00 - 0.20 10*3/mm3 Final     Immature Grans, Absolute   Date Value Ref Range Status   03/17/2023 0.02 0.00 - 0.05 10*3/mm3 Final     nRBC   Date Value Ref Range Status   03/17/2023 0.0 0.0 - 0.2 /100 WBC Final      UA    Urinalysis 7/18/22 8/29/22 3/17/23 3/17/23      1537 1537   Squamous Epithelial Cells, UA    0-2   Specific Gravity, UA   >1.030 (A)    Ketones, UA Negative Negative Negative    Blood, UA   Negative    Leukocytes, UA Negative Negative Negative    Nitrite, UA   Negative    RBC, UA    0-2 (A)   WBC, UA    0-2 (A)   Bacteria, UA    None Seen   (A) Abnormal value               Imaging:  CT Angiogram Neck    Result Date: 3/17/2023  PROCEDURE: CT ANGIOGRAM HEAD W AI ANALYSIS OF LVO, 3/17/2023, 14:13 CT ANGIOGRAM NECK, 3/17/2023, 14:13  COMPARISON: [facility], CT, CT CEREBRAL PERFUSION W WO CONTRAST, 3/17/2023, 14:06.  [facility], CT, CT HEAD WO CONTRAST STROKE PROTOCOL, 3/17/2023, 13:49.  INDICATIONS: Neuro deficit,  acute, stroke suspected  PROTOCOL:   Standard imaging protocol performed    RADIATION:   DLP: 1104 mGy*cm   Automated exposure control was utilized to minimize radiation dose. CONTRAST: 75 cc Isovue 370 I.V. RAPID: CTA imaging was analyzed using RAPID AI to enable computer assisted triage notification to rapidly detect a large vessel occlusion (LVO) and shorten notification time  TECHNIQUE: After obtaining the patient's consent, CT images of the head were obtained without and with non-ionic contrast, and multi-planar/3-D imaging were created and interpreted to optimize visualization of vascular anatomy.   FINDINGS:  Vascular findings: CTA head:  The right P1 segment of the posterior cerebral artery is diffusely small with diffusely prominent right posterior communicating artery, this is anatomic variation.  No large vessel occlusions or significant focal stenosis or aneurysms are identified.  There is moderate degree of calcification in the intracranial portions of the ICAs bilaterally mild luminal irregularity in the distal right M1 segment of the middle cerebral artery.  There is mild luminal irregularity in the the left PCA without significant focal stenosis.  The right vertebral artery is dominant with mild to moderate diffuse atherosclerotic disease.  The basilar artery is patent with mild luminal irregularity.  CTA neck: Right-sided findings:  The right common carotid artery are widely patent without disease.  There is a mild degree of calcification in the carotid bulb.  The right external carotid artery and cervical portion of the right ICA are widely patent without significant disease.  The left vertebral artery is non dominant and is widely patent.  Left-sided findings:  The brachiocephalic artery is mildly tortuous mild diffuse disease.  The right common carotid artery is patent.  There is mild to moderate amount of atherosclerotic disease in the carotid bulb.  The right internal carotid artery is mildly  tortuous with mild diffuse disease.  No hemodynamically significant stenosis by NASCET criteria is seen.  The right vertebral artery is dominant and wide and widely patent.  Nonvascular findings.  No pathologic enhancement is seen in the brain.  There is a areas old lacunar infarcts and chronic small vessel ischemic disease as seen on the noncontrast CT.  No cervical adenopathy or acute fluid collections or inflammatory changes are seen in the soft tissues of the neck.  The thyroid gland is diffusely enlarged.  There is degenerative disc disease in the cervical spine.       CTA head:  No significant focal stenosis large vessel occlusions or aneurysms are demonstrated.  CTA neck:  Mild degree of calcification in the right carotid bulb and mild to moderate amount of disease in the left carotid bulb.  No hemodynamically significant stenosis by NASCET criteria.       DANIELLE HERRON DO       Electronically Signed and Approved By: DANIELLE HERRON DO on 3/17/2023 at 15:51             XR Chest 1 View    Result Date: 3/17/2023  PROCEDURE: XR CHEST 1 VW  COMPARISON: None  INDICATIONS: Acute Stroke Protocol (Onset < 12 hrs)  FINDINGS:  Cardiac silhouette is enlarged.  Mediastinal silhouette and pulmonary vascularity is unremarkable.  The lungs are grossly clear.  No pneumothorax or pleural effusions.  Bony structures are intact.       Cardiomegaly.  No active cardiopulmonary disease.       DANIELLE HERRON DO       Electronically Signed and Approved By: DANIELLE HERRON DO on 3/17/2023 at 15:30             CT Head Without Contrast Stroke Protocol    Result Date: 3/17/2023  PROCEDURE: CT HEAD WO CONTRAST STROKE PROTOCOL  COMPARISON:  None  INDICATIONS: Neuro deficit, acute, stroke suspected  PROTOCOL:   Standard imaging protocol performed    RADIATION:   DLP: 1018.2 mGy*cm   MA and/or KV was adjusted to minimize radiation dose.    TECHNIQUE: CT images were obtained without non-ionic intravenous contrast material.  FINDINGS:  The  ventricles, sulci, and cerebellar folia are moderately and diffusely prominent consistent with atrophy.  Ill-defined diminished density in cerebral white matter is consistent with moderate gliosis and/or numerous lacunar infarcts.  1 cm area of encephalomalacia in the region of the head of the caudate nucleus on the left and 7 mm area of encephalomalacia in right periventricular white matter are consistent with old infarcts.  There is no CT evidence of acute intracranial hemorrhage, mass, or mass effect.  The orbits have a normal appearance.  The paranasal sinuses, middle ears, and mastoid air cells are well aerated.  No fractures are seen on bone window images.        CT scan of the head without IV contrast demonstrating diffuse atrophy and white matter changes.  Areas of encephalomalacia are consistent with old infarcts.     MALATHI HEBERT MD       Electronically Signed and Approved By: MALATHI HEBERT MD on 3/17/2023 at 13:57             CT Angiogram Head w AI Analysis of LVO    Result Date: 3/17/2023  PROCEDURE: CT ANGIOGRAM HEAD W AI ANALYSIS OF LVO, 3/17/2023, 14:13 CT ANGIOGRAM NECK, 3/17/2023, 14:13  COMPARISON: [facility], CT, CT CEREBRAL PERFUSION W WO CONTRAST, 3/17/2023, 14:06.  [facility], CT, CT HEAD WO CONTRAST STROKE PROTOCOL, 3/17/2023, 13:49.  INDICATIONS: Neuro deficit, acute, stroke suspected  PROTOCOL:   Standard imaging protocol performed    RADIATION:   DLP: 1104 mGy*cm   Automated exposure control was utilized to minimize radiation dose. CONTRAST: 75 cc Isovue 370 I.V. RAPID: CTA imaging was analyzed using RAPID AI to enable computer assisted triage notification to rapidly detect a large vessel occlusion (LVO) and shorten notification time  TECHNIQUE: After obtaining the patient's consent, CT images of the head were obtained without and with non-ionic contrast, and multi-planar/3-D imaging were created and interpreted to optimize visualization of vascular anatomy.    FINDINGS:  Vascular findings: CTA head:  The right P1 segment of the posterior cerebral artery is diffusely small with diffusely prominent right posterior communicating artery, this is anatomic variation.  No large vessel occlusions or significant focal stenosis or aneurysms are identified.  There is moderate degree of calcification in the intracranial portions of the ICAs bilaterally mild luminal irregularity in the distal right M1 segment of the middle cerebral artery.  There is mild luminal irregularity in the the left PCA without significant focal stenosis.  The right vertebral artery is dominant with mild to moderate diffuse atherosclerotic disease.  The basilar artery is patent with mild luminal irregularity.  CTA neck: Right-sided findings:  The right common carotid artery are widely patent without disease.  There is a mild degree of calcification in the carotid bulb.  The right external carotid artery and cervical portion of the right ICA are widely patent without significant disease.  The left vertebral artery is non dominant and is widely patent.  Left-sided findings:  The brachiocephalic artery is mildly tortuous mild diffuse disease.  The right common carotid artery is patent.  There is mild to moderate amount of atherosclerotic disease in the carotid bulb.  The right internal carotid artery is mildly tortuous with mild diffuse disease.  No hemodynamically significant stenosis by NASCET criteria is seen.  The right vertebral artery is dominant and wide and widely patent.  Nonvascular findings.  No pathologic enhancement is seen in the brain.  There is a areas old lacunar infarcts and chronic small vessel ischemic disease as seen on the noncontrast CT.  No cervical adenopathy or acute fluid collections or inflammatory changes are seen in the soft tissues of the neck.  The thyroid gland is diffusely enlarged.  There is degenerative disc disease in the cervical spine.       CTA head:  No significant focal  stenosis large vessel occlusions or aneurysms are demonstrated.  CTA neck:  Mild degree of calcification in the right carotid bulb and mild to moderate amount of disease in the left carotid bulb.  No hemodynamically significant stenosis by NASCET criteria.       DANIELLE HERRON DO       Electronically Signed and Approved By: DANIELLE HERRON DO on 3/17/2023 at 15:51             CT CEREBRAL PERFUSION WITH & WITHOUT CONTRAST    Result Date: 3/17/2023  PROCEDURE: CT CEREBRAL PERFUSION W WO CONTRAST  COMPARISON: None  INDICATIONS: Neuro deficit, acute, stroke suspected  PROTOCOL:   Standard imaging protocol performed    RADIATION:   DLP: 1299.6 mGy*cm   Automated exposure control was utilized to minimize radiation dose. CONTRAST: 80 cc Isovue 370 I.V.   FINDINGS:  Nonspecific perfusion abnormality is seen, however, no core infarct or tissue at risk is evident.        CT cerebral perfusion study demonstrating nonspecific perfusion abnormality, as above.      MALATHI HEBERT MD       Electronically Signed and Approved By: MALATHI HEBERT MD on 3/17/2023 at 15:29                  Assessment & Plan   Assessment / Plan     Active Hospital Problems    Diagnosis  POA   • **Cerebrovascular accident (CVA), unspecified mechanism (HCC) [I63.9]  Yes     Priority: High   • Benign essential HTN [I10]  Yes     Priority: Medium   • Type 2 diabetes mellitus (HCC) [E11.9]  Yes     Priority: Low   • Hyperlipidemia [E78.5]  Yes     Priority: Low        Assessment/Plan:   1. Rule out CVA-patient presents with left-sided weakness x1 day.  Patient has had similar episodes in the past.  Patient has been diagnosed with TIAs and switch from Plavix to Brilinta as per patient.  On patient's medication list, patient only has Plavix listed.  Patient does have an MRI done in 12/2020 showing acute left superior lateral pontine infarction.  Patient will be worked up for acute CVA.  We will get an MRI of the brain, 2D echo and carotid Doppler.  Patient will  be placed on Plavix, aspirin and statin.  We will get a PT/OT/speech and neurology consult.  2. Primary hypertension-patient with history of hypertension.  We will allow for permissible hypertension secondary to patient having acute CVA type symptoms.  3. Type 2 diabetes-patient with history of type 2 diabetes.  Patient will be placed on insulin sliding scale.  Accu-Cheks as scheduled.  4. Hyperlipidemia-patient with history of hyperlipidemia.  Patient will be continued on his home statin.      DVT prophylaxis:  Mechanical DVT prophylaxis orders are present.    CODE STATUS:    Medical Intervention Limits: NO intubation (DNI)  Level Of Support Discussed With: Patient  Code Status (Patient has no pulse and is not breathing): No CPR (Do Not Attempt to Resuscitate)  Medical Interventions (Patient has pulse or is breathing): Limited Support  Release to patient: Routine Release      Admission Status:  I believe this patient meets observation status.    Electronically signed by Teresa Patel MD, 03/17/23, 5:46 PM EDT.

## 2023-03-17 NOTE — ED PROVIDER NOTES
"Time: 1:44 PM EDT  Date of encounter:  3/17/2023  Independent Historian/Clinical History and Information was obtained by:   Patient  Chief Complaint: left-sided weakness    History is limited by: N/A    History of Present Illness:  Patient is a 62 y.o. year old male who presents to the emergency department for evaluation of left-sided weakness. Patient reports he started having left-sided weakness involving his LUE and LLE about an hour ago. He notes some slight L sided facial asymmetry. He also reports numbness in his LUE. He denies numbness or tingling in the LLE. He reports he could barely walk due to his symptoms and almost fell.    Patient states he has had similar symptoms previously that come and go in severity. He states he had an episode earlier this week that woke him up. He reports hx of TIA's and states he is on Brilinta.    Patient reports he is on an antihypertensive medication and has been taking it as directed. He states he only check his blood pressure at home when he \"starts getting symptoms,\" but he states he is not aware of it being high lately.    HPI    Patient Care Team  Primary Care Provider: Carlos Townsend APRN    Past Medical History:     No Known Allergies  Past Medical History:   Diagnosis Date   • Diabetes mellitus (HCC)    • Erectile dysfunction    • Urinary urgency      Past Surgical History:   Procedure Laterality Date   • APPENDECTOMY       History reviewed. No pertinent family history.    Home Medications:  Prior to Admission medications    Medication Sig Start Date End Date Taking? Authorizing Provider   atorvastatin (LIPITOR) 80 MG tablet 80 mg. 6/23/22   Narcisa Wilson MD   atorvastatin (LIPITOR) 80 MG tablet Take 1 tablet by mouth Daily. 3/16/23      Brilinta 90 MG tablet tablet  6/23/22   Narcisa Wilson MD   clopidogrel (PLAVIX) 75 MG tablet  8/9/22   ProviderNarcisa MD   Cyanocobalamin (B-12) 500 MCG sublingual tablet Place 1,000 m under the tongue " Daily. 3/1/23      Cyanocobalamin 500 MCG sublingual tablet 1,000 mcg. 6/23/22   Narcisa Wilson MD   Dulaglutide (Trulicity) 0.75 MG/0.5ML solution pen-injector Inject 0.75 mg under the skin into the appropriate area as directed Every 7 (Seven) Days. 3/16/23      empagliflozin (Jardiance) 25 MG tablet tablet Take 1 tablet by mouth Daily. 3/16/23      Jardiance 25 MG tablet tablet  7/1/22   Narcisa Wilson MD   lisinopril (PRINIVIL,ZESTRIL) 20 MG tablet Take 1 tablet by mouth Daily. 3/1/23      lisinopril (PRINIVIL,ZESTRIL) 40 MG tablet  7/1/22   Narcisa Wilson MD   lisinopril (PRINIVIL,ZESTRIL) 40 MG tablet Take 1 tablet by mouth Daily. 3/16/23      metFORMIN (GLUCOPHAGE) 1000 MG tablet  5/25/22   Narcisa Wilson MD   metFORMIN (GLUCOPHAGE) 1000 MG tablet Take 1 tablet by mouth Daily With Breakfast & Dinner. 3/16/23      nicotine (NICODERM CQ) 14 MG/24HR patch  6/24/22   Narcisa Wilson MD   nicotine (NICODERM CQ) 21 MG/24HR patch  8/22/22   Narcisa Wilson MD   nicotine (NICODERM CQ) 21 MG/24HR patch Place 1 patch on the skin as directed by provider Daily. 3/1/23      rosuvastatin (CRESTOR) 40 MG tablet rosuvastatin 40 mg oral tablet take 1 tablet (40 mg) by oral route once daily   Active    Narcisa Wilson MD   Semaglutide,0.25 or 0.5MG/DOS, (Ozempic, 0.25 or 0.5 MG/DOSE,) 2 MG/1.5ML solution pen-injector 0.25 mg. 6/22/22   Narcisa Wilson MD   sildenafil (VIAGRA) 100 MG tablet Take  mg by mouth. 1/6/22 1/6/23  Narcisa Wilson MD   tamsulosin (FLOMAX) 0.4 MG capsule 24 hr capsule Take 1 capsule by mouth Daily for 360 days. 7/18/22 7/13/23  Lashay Leonardo APRN   tamsulosin (FLOMAX) 0.4 MG capsule 24 hr capsule Take 1 capsule by mouth Daily. 3/16/23      ticagrelor (Brilinta) 60 MG tablet tablet Take 1 tablet by mouth 2 (Two) Times a Day. 3/16/23      Trulicity 0.75 MG/0.5ML solution pen-injector  7/25/22   Provider, MD Narcisa   lisinopril  "(PRINIVIL,ZESTRIL) 40 MG tablet Take 1 tablet by mouth Daily. 3/1/23 3/16/23     tamsulosin (FLOMAX) 0.4 MG capsule 24 hr capsule Take 1 capsule by mouth Daily. 2/12/23 3/16/23  Lashay Leonardo, APRN        Social History:   Social History     Tobacco Use   • Smoking status: Former     Packs/day: 1.00     Years: 40.00     Pack years: 40.00     Types: Cigarettes     Start date: 1982     Quit date: 2022     Years since quittin.7   • Smokeless tobacco: Never   Vaping Use   • Vaping Use: Never used   Substance Use Topics   • Alcohol use: Never   • Drug use: Never         Review of Systems:  Review of Systems   Constitutional: Negative for chills and fever.   HENT: Negative for sore throat.    Eyes: Negative for photophobia.   Respiratory: Negative for shortness of breath.    Cardiovascular: Negative for chest pain.   Gastrointestinal: Negative for abdominal pain, diarrhea, nausea and vomiting.   Genitourinary: Negative for dysuria.   Musculoskeletal: Negative for neck pain.   Skin: Negative for wound.   Neurological: Positive for facial asymmetry (slight, left-sided), weakness (left-sided involving LUE and LLE) and numbness (LUE). Negative for headaches.        Negative for numbness or tingling in LLE   All other systems reviewed and are negative.       Physical Exam:  BP (!) 190/93   Pulse 70   Temp 97.9 °F (36.6 °C) (Oral)   Resp 18   Ht 190.5 cm (75\")   Wt 119 kg (262 lb 2 oz)   SpO2 96%   BMI 32.76 kg/m²     Physical Exam  Vitals and nursing note reviewed.   Constitutional:       General: He is not in acute distress.  HENT:      Head: Normocephalic and atraumatic.   Eyes:      Extraocular Movements: Extraocular movements intact.   Cardiovascular:      Rate and Rhythm: Normal rate and regular rhythm.   Pulmonary:      Effort: Pulmonary effort is normal. No respiratory distress.      Breath sounds: Normal breath sounds.   Abdominal:      General: Abdomen is flat.      Palpations: Abdomen is " soft.      Tenderness: There is no abdominal tenderness.   Musculoskeletal:         General: Normal range of motion.      Cervical back: Normal range of motion and neck supple.   Skin:     General: Skin is warm and dry.      Capillary Refill: Capillary refill takes less than 2 seconds.   Neurological:      Mental Status: He is alert and oriented to person, place, and time. Mental status is at baseline.      Cranial Nerves: No dysarthria.      Sensory: Sensory deficit (LUE) present.      Comments: Weakness in LLE, Slight weakness in LUE                  Procedures:  Procedures      Medical Decision Making:      Comorbidities that affect care:    Diabetes, Hypertension    External Notes reviewed:    None      The following orders were placed and all results were independently analyzed by me:  Orders Placed This Encounter   Procedures   • CT Head Without Contrast Stroke Protocol   • CT Angiogram Head w AI Analysis of LVO   • CT Angiogram Neck   • CT CEREBRAL PERFUSION WITH & WITHOUT CONTRAST   • XR Chest 1 View   • Parker Draw   • Comprehensive Metabolic Panel   • Protime-INR   • aPTT   • Single High Sensitivity Troponin T   • Urinalysis With Microscopic If Indicated (No Culture) - Urine, Clean Catch   • CBC Auto Differential   • Urinalysis, Microscopic Only - Urine, Clean Catch   • NPO Diet NPO Type: Strict NPO   • Initiate Department's Acute Stroke Process (Team D, Code 19, etc)   • Perform NIH Stroke Scale   • Measure Actual Weight   • Head of Bed 30 Degrees or Less   • Undress and Gown   • Cardiac Monitoring   • Continuous Pulse Oximetry   • Vital Signs   • Neuro Checks   • Notify MD for SBP < 80 or > 200   • Notify Provider for SBP greater than 140 if hemorrhagic Stroke   • No Hypotonic Fluids   • Nursing Swallow Assessment   • Inpatient Hospitalist Consult   • Oxygen Therapy- Nasal Cannula; 2 LPM; Titrate for SPO2: equal to or greater than, 94%   • SLP Consult: Eval & Treat RN Dysphagia Screen Failed   • POC  Glucose Once   • POC Glucose Once   • ECG 12 Lead ED Triage Standing Order; Acute Stroke (Onset <12 hrs)   • Type & Screen   • ABO RH Specimen Verification   • Insert Large Bore Peripheral IV - Right AC Preferred   • CBC & Differential   • Green Top (Gel)   • Lavender Top   • Gold Top - SST   • Light Blue Top       Medications Given in the Emergency Department:  Medications   sodium chloride 0.9 % flush 10 mL (has no administration in time range)   iopamidol (ISOVUE-370) 76 % injection 150 mL (150 mL Intravenous Given 3/17/23 1406)   aspirin chewable tablet 324 mg (324 mg Oral Given 3/17/23 1526)   labetalol (NORMODYNE,TRANDATE) injection 10 mg (10 mg Intravenous Given 3/17/23 1541)        ED Course:         Labs:    Lab Results (last 24 hours)     Procedure Component Value Units Date/Time    POC Glucose Once [[id number]]  (Abnormal) Collected: 03/17/23 1341    Specimen: Blood Updated: 03/17/23 1349     Glucose 159 mg/dL      Comment: Serial Number: [id number]:  [id number]       CBC & Differential [[id number]]  (Normal) Collected: 03/17/23 1359    Specimen: Blood Updated: 03/17/23 1438    Narrative:      The following orders were created for panel order CBC & Differential.  Procedure                               Abnormality         Status                     ---------                               -----------         ------                     CBC Auto Differential[[id number]]        Normal              Final result                 Please view results for these tests on the individual orders.    Comprehensive Metabolic Panel [[id number]]  (Abnormal) Collected: 03/17/23 1359    Specimen: Blood Updated: 03/17/23 1456     Glucose 159 mg/dL      BUN 19 mg/dL      Creatinine 1.22 mg/dL      Sodium 140 mmol/L      Potassium 3.9 mmol/L      Chloride 102 mmol/L      CO2 26.3 mmol/L      Calcium 9.7 mg/dL      Total Protein 7.5 g/dL      Albumin 4.6 g/dL      ALT (SGPT) 28 U/L      AST (SGOT) 25 U/L      Alkaline  Phosphatase 140 U/L      Total Bilirubin 0.5 mg/dL      Globulin 2.9 gm/dL      A/G Ratio 1.6 g/dL      BUN/Creatinine Ratio 15.6     Anion Gap 11.7 mmol/L      eGFR 67.0 mL/min/1.73     Narrative:      GFR Normal >60  Chronic Kidney Disease <60  Kidney Failure <15      Protime-INR [[id number]]  (Normal) Collected: 03/17/23 1359    Specimen: Blood Updated: 03/17/23 1543     Protime 12.9 Seconds      INR 0.96    Narrative:      Suggested Therapeutic Ranges For Oral Anticoagulant Therapy:  Level of Therapy                      INR Target Range  Standard Dose                            2.0-3.0  High Dose                                2.5-3.5  Patients not receiving anticoagulant  Therapy Normal Range                     0.86-1.15    aPTT [[id number]]  (Normal) Collected: 03/17/23 1359    Specimen: Blood Updated: 03/17/23 1543     PTT 30.5 seconds     Single High Sensitivity Troponin T [[id number]]  (Abnormal) Collected: 03/17/23 1359    Specimen: Blood Updated: 03/17/23 1456     HS Troponin T 23 ng/L     Narrative:      High Sensitive Troponin T Reference Range:  <10.0 ng/L- Negative Female for AMI  <15.0 ng/L- Negative Male for AMI  >=10 - Abnormal Female indicating possible myocardial injury.  >=15 - Abnormal Male indicating possible myocardial injury.   Clinicians would have to utilize clinical acumen, EKG, Troponin, and serial changes to determine if it is an Acute Myocardial Infarction or myocardial injury due to an underlying chronic condition.         CBC Auto Differential [[id number]]  (Normal) Collected: 03/17/23 1359    Specimen: Blood Updated: 03/17/23 1438     WBC 6.47 10*3/mm3      RBC 5.38 10*6/mm3      Hemoglobin 15.5 g/dL      Hematocrit 45.6 %      MCV 84.8 fL      MCH 28.8 pg      MCHC 34.0 g/dL      RDW 13.7 %      RDW-SD 42.5 fl      MPV 10.2 fL      Platelets 212 10*3/mm3      Neutrophil % 63.2 %      Lymphocyte % 23.5 %      Monocyte % 10.5 %      Eosinophil % 1.9 %      Basophil % 0.6 %       Immature Grans % 0.3 %      Neutrophils, Absolute 4.09 10*3/mm3      Lymphocytes, Absolute 1.52 10*3/mm3      Monocytes, Absolute 0.68 10*3/mm3      Eosinophils, Absolute 0.12 10*3/mm3      Basophils, Absolute 0.04 10*3/mm3      Immature Grans, Absolute 0.02 10*3/mm3      nRBC 0.0 /100 WBC     Urinalysis With Microscopic If Indicated (No Culture) - Urine, Clean Catch [[id number]]  (Abnormal) Collected: 03/17/23 1537    Specimen: Urine, Clean Catch Updated: 03/17/23 1554     Color, UA Yellow     Appearance, UA Clear     pH, UA 5.5     Specific Gravity, UA >1.030     Glucose, UA >=1000 mg/dL (3+)     Ketones, UA Negative     Bilirubin, UA Negative     Blood, UA Negative     Protein,  mg/dL (2+)     Leuk Esterase, UA Negative     Nitrite, UA Negative     Urobilinogen, UA 0.2 E.U./dL    Urinalysis, Microscopic Only - Urine, Clean Catch [[id number]]  (Abnormal) Collected: 03/17/23 1537    Specimen: Urine, Clean Catch Updated: 03/17/23 1554     RBC, UA 0-2 /HPF      WBC, UA 0-2 /HPF      Bacteria, UA None Seen /HPF      Squamous Epithelial Cells, UA 0-2 /HPF      Hyaline Casts, UA 0-2 /LPF      Methodology Automated Microscopy           Imaging:    CT Angiogram Neck    Result Date: 3/17/2023  PROCEDURE: CT ANGIOGRAM HEAD W AI ANALYSIS OF LVO, 3/17/2023, 14:13 CT ANGIOGRAM NECK, 3/17/2023, 14:13  COMPARISON: [facility], CT, CT CEREBRAL PERFUSION W WO CONTRAST, 3/17/2023, 14:06.  [facility], CT, CT HEAD WO CONTRAST STROKE PROTOCOL, 3/17/2023, 13:49.  INDICATIONS: Neuro deficit, acute, stroke suspected  PROTOCOL:   Standard imaging protocol performed    RADIATION:   DLP: 1104 mGy*cm   Automated exposure control was utilized to minimize radiation dose. CONTRAST: 75 cc Isovue 370 I.V. RAPID: CTA imaging was analyzed using RAPID AI to enable computer assisted triage notification to rapidly detect a large vessel occlusion (LVO) and shorten notification time  TECHNIQUE: After obtaining the  patient's consent, CT images of the head were obtained without and with non-ionic contrast, and multi-planar/3-D imaging were created and interpreted to optimize visualization of vascular anatomy.   FINDINGS:  Vascular findings: CTA head:  The right P1 segment of the posterior cerebral artery is diffusely small with diffusely prominent right posterior communicating artery, this is anatomic variation.  No large vessel occlusions or significant focal stenosis or aneurysms are identified.  There is moderate degree of calcification in the intracranial portions of the ICAs bilaterally mild luminal irregularity in the distal right M1 segment of the middle cerebral artery.  There is mild luminal irregularity in the the left PCA without significant focal stenosis.  The right vertebral artery is dominant with mild to moderate diffuse atherosclerotic disease.  The basilar artery is patent with mild luminal irregularity.  CTA neck: Right-sided findings:  The right common carotid artery are widely patent without disease.  There is a mild degree of calcification in the carotid bulb.  The right external carotid artery and cervical portion of the right ICA are widely patent without significant disease.  The left vertebral artery is non dominant and is widely patent.  Left-sided findings:  The brachiocephalic artery is mildly tortuous mild diffuse disease.  The right common carotid artery is patent.  There is mild to moderate amount of atherosclerotic disease in the carotid bulb.  The right internal carotid artery is mildly tortuous with mild diffuse disease.  No hemodynamically significant stenosis by NASCET criteria is seen.  The right vertebral artery is dominant and wide and widely patent.  Nonvascular findings.  No pathologic enhancement is seen in the brain.  There is a areas old lacunar infarcts and chronic small vessel ischemic disease as seen on the noncontrast CT.  No cervical adenopathy or acute fluid collections or  inflammatory changes are seen in the soft tissues of the neck.  The thyroid gland is diffusely enlarged.  There is degenerative disc disease in the cervical spine.       CTA head:  No significant focal stenosis large vessel occlusions or aneurysms are demonstrated.  CTA neck:  Mild degree of calcification in the right carotid bulb and mild to moderate amount of disease in the left carotid bulb.  No hemodynamically significant stenosis by NASCET criteria.       DANIELLE HERRON DO       Electronically Signed and Approved By: DANIELLE HERRON DO on 3/17/2023 at 15:51             XR Chest 1 View    Result Date: 3/17/2023  PROCEDURE: XR CHEST 1 VW  COMPARISON: None  INDICATIONS: Acute Stroke Protocol (Onset < 12 hrs)  FINDINGS:  Cardiac silhouette is enlarged.  Mediastinal silhouette and pulmonary vascularity is unremarkable.  The lungs are grossly clear.  No pneumothorax or pleural effusions.  Bony structures are intact.       Cardiomegaly.  No active cardiopulmonary disease.       DANIELLE HERRON DO       Electronically Signed and Approved By: DANIELLE HERRON DO on 3/17/2023 at 15:30             CT Head Without Contrast Stroke Protocol    Result Date: 3/17/2023  PROCEDURE: CT HEAD WO CONTRAST STROKE PROTOCOL  COMPARISON:  None  INDICATIONS: Neuro deficit, acute, stroke suspected  PROTOCOL:   Standard imaging protocol performed    RADIATION:   DLP: 1018.2 mGy*cm   MA and/or KV was adjusted to minimize radiation dose.    TECHNIQUE: CT images were obtained without non-ionic intravenous contrast material.  FINDINGS:  The ventricles, sulci, and cerebellar folia are moderately and diffusely prominent consistent with atrophy.  Ill-defined diminished density in cerebral white matter is consistent with moderate gliosis and/or numerous lacunar infarcts.  1 cm area of encephalomalacia in the region of the head of the caudate nucleus on the left and 7 mm area of encephalomalacia in right periventricular white matter are consistent with  old infarcts.  There is no CT evidence of acute intracranial hemorrhage, mass, or mass effect.  The orbits have a normal appearance.  The paranasal sinuses, middle ears, and mastoid air cells are well aerated.  No fractures are seen on bone window images.        CT scan of the head without IV contrast demonstrating diffuse atrophy and white matter changes.  Areas of encephalomalacia are consistent with old infarcts.     MALATHI HEBERT MD       Electronically Signed and Approved By: MALATHI HEBERT MD on 3/17/2023 at 13:57             CT Angiogram Head w AI Analysis of LVO    Result Date: 3/17/2023  PROCEDURE: CT ANGIOGRAM HEAD W AI ANALYSIS OF LVO, 3/17/2023, 14:13 CT ANGIOGRAM NECK, 3/17/2023, 14:13  COMPARISON: [facility], CT, CT CEREBRAL PERFUSION W WO CONTRAST, 3/17/2023, 14:06.  [facility], CT, CT HEAD WO CONTRAST STROKE PROTOCOL, 3/17/2023, 13:49.  INDICATIONS: Neuro deficit, acute, stroke suspected  PROTOCOL:   Standard imaging protocol performed    RADIATION:   DLP: 1104 mGy*cm   Automated exposure control was utilized to minimize radiation dose. CONTRAST: 75 cc Isovue 370 I.V. RAPID: CTA imaging was analyzed using RAPID AI to enable computer assisted triage notification to rapidly detect a large vessel occlusion (LVO) and shorten notification time  TECHNIQUE: After obtaining the patient's consent, CT images of the head were obtained without and with non-ionic contrast, and multi-planar/3-D imaging were created and interpreted to optimize visualization of vascular anatomy.   FINDINGS:  Vascular findings: CTA head:  The right P1 segment of the posterior cerebral artery is diffusely small with diffusely prominent right posterior communicating artery, this is anatomic variation.  No large vessel occlusions or significant focal stenosis or aneurysms are identified.  There is moderate degree of calcification in the intracranial portions of the ICAs bilaterally mild luminal irregularity  in the distal right M1 segment of the middle cerebral artery.  There is mild luminal irregularity in the the left PCA without significant focal stenosis.  The right vertebral artery is dominant with mild to moderate diffuse atherosclerotic disease.  The basilar artery is patent with mild luminal irregularity.  CTA neck: Right-sided findings:  The right common carotid artery are widely patent without disease.  There is a mild degree of calcification in the carotid bulb.  The right external carotid artery and cervical portion of the right ICA are widely patent without significant disease.  The left vertebral artery is non dominant and is widely patent.  Left-sided findings:  The brachiocephalic artery is mildly tortuous mild diffuse disease.  The right common carotid artery is patent.  There is mild to moderate amount of atherosclerotic disease in the carotid bulb.  The right internal carotid artery is mildly tortuous with mild diffuse disease.  No hemodynamically significant stenosis by NASCET criteria is seen.  The right vertebral artery is dominant and wide and widely patent.  Nonvascular findings.  No pathologic enhancement is seen in the brain.  There is a areas old lacunar infarcts and chronic small vessel ischemic disease as seen on the noncontrast CT.  No cervical adenopathy or acute fluid collections or inflammatory changes are seen in the soft tissues of the neck.  The thyroid gland is diffusely enlarged.  There is degenerative disc disease in the cervical spine.       CTA head:  No significant focal stenosis large vessel occlusions or aneurysms are demonstrated.  CTA neck:  Mild degree of calcification in the right carotid bulb and mild to moderate amount of disease in the left carotid bulb.  No hemodynamically significant stenosis by NASCET criteria.       DANIELLE HERRON DO       Electronically Signed and Approved By: DANIELLE HERRON DO on 3/17/2023 at 15:51             CT CEREBRAL PERFUSION WITH & WITHOUT  CONTRAST    Result Date: 3/17/2023  PROCEDURE: CT CEREBRAL PERFUSION W WO CONTRAST  COMPARISON: None  INDICATIONS: Neuro deficit, acute, stroke suspected  PROTOCOL:   Standard imaging protocol performed    RADIATION:   DLP: 1299.6 mGy*cm   Automated exposure control was utilized to minimize radiation dose. CONTRAST: 80 cc Isovue 370 I.V.   FINDINGS:  Nonspecific perfusion abnormality is seen, however, no core infarct or tissue at risk is evident.        CT cerebral perfusion study demonstrating nonspecific perfusion abnormality, as above.      AMLATHI HEBERT MD       Electronically Signed and Approved By: MALATHI HEBERT MD on 3/17/2023 at 15:29                 Differential Diagnosis and Discussion:    Weakness: Based on the patient's history, signs, and symptoms, the diffential diagnosis includes but is not limited to meningitis, stroke, sepsis, subarachnoid hemorrhage, intracranial bleeding, encephalitis, acute uti, dehydration, MS, myasthenia gravis, Guillan [place], migraine variant, neuromuscular disorders vertigo, electrolyte imbalance, and metabolic disorders.    All labs were reviewed and interpreted by me.  All X-rays were independently reviewed by me.  EKG was interpreted by me.  CT scan radiology interpretation was reviewed by me.    MDM   62-year-old male patient presents with strokelike symptoms affecting his left side.  Patient was evaluated by teleneurology who did not recommend TNK due to the patient having strokelike symptoms intermittently during this past week.  The recommendation is also to allow permissive hypertension and to admit the patient for MRI and further evaluation of his hypertension.  Patient on reevaluation is having improvements in his left-sided weakness and numbness.      Patient Care Considerations:    MRI: I considered ordering an MRI however  Patient being admitted and MRI will be done outpatient.      Consultants/Shared Management Plan:  Patient evaluated by telemetry neurology.   Their recommendation is that the patient is not a TNK candidate due to his having TIA symptoms several times during this past week.    Patient was discussed with , hospitalist, who will admit the patient        Social Determinants of Health:    Patient is independent, reliable, and has access to care.       Disposition and Care Coordination:    Admit:   Through independent evaluation of the patient's history, physical, and imperical data, the patient meets criteria for observation/admission to the hospital.        Final diagnoses:   Cerebrovascular accident (CVA), unspecified mechanism (HCC)        ED Disposition     ED Disposition   Decision to Admit    Condition   --    Comment   --             This medical record created using voice recognition software.    Documentation assistance provided by Alex Huber acting as scribe for Enoc Adler DO. Information recorded by the scribe was done at my direction and has been verified and validated by me.           Alex Huber  03/17/23 1401       Enoc Adler DO  03/17/23 3332

## 2023-03-18 ENCOUNTER — APPOINTMENT (OUTPATIENT)
Dept: MRI IMAGING | Facility: HOSPITAL | Age: 63
DRG: 65 | End: 2023-03-18
Payer: COMMERCIAL

## 2023-03-18 ENCOUNTER — APPOINTMENT (OUTPATIENT)
Dept: CARDIOLOGY | Facility: HOSPITAL | Age: 63
DRG: 65 | End: 2023-03-18
Payer: COMMERCIAL

## 2023-03-18 LAB
ANION GAP SERPL CALCULATED.3IONS-SCNC: 9.3 MMOL/L (ref 5–15)
BASOPHILS # BLD AUTO: 0.04 10*3/MM3 (ref 0–0.2)
BASOPHILS NFR BLD AUTO: 0.8 % (ref 0–1.5)
BUN SERPL-MCNC: 19 MG/DL (ref 8–23)
BUN/CREAT SERPL: 20.4 (ref 7–25)
CALCIUM SPEC-SCNC: 9.2 MG/DL (ref 8.6–10.5)
CHLORIDE SERPL-SCNC: 105 MMOL/L (ref 98–107)
CHOLEST SERPL-MCNC: 104 MG/DL (ref 0–200)
CO2 SERPL-SCNC: 23.7 MMOL/L (ref 22–29)
CREAT SERPL-MCNC: 0.93 MG/DL (ref 0.76–1.27)
DEPRECATED RDW RBC AUTO: 41.3 FL (ref 37–54)
EGFRCR SERPLBLD CKD-EPI 2021: 92.8 ML/MIN/1.73
EOSINOPHIL # BLD AUTO: 0.2 10*3/MM3 (ref 0–0.4)
EOSINOPHIL NFR BLD AUTO: 3.8 % (ref 0.3–6.2)
ERYTHROCYTE [DISTWIDTH] IN BLOOD BY AUTOMATED COUNT: 13.5 % (ref 12.3–15.4)
GLUCOSE BLDC GLUCOMTR-MCNC: 105 MG/DL (ref 70–99)
GLUCOSE BLDC GLUCOMTR-MCNC: 119 MG/DL (ref 70–99)
GLUCOSE BLDC GLUCOMTR-MCNC: 214 MG/DL (ref 70–99)
GLUCOSE BLDC GLUCOMTR-MCNC: 93 MG/DL (ref 70–99)
GLUCOSE SERPL-MCNC: 111 MG/DL (ref 65–99)
HBA1C MFR BLD: 6.6 % (ref 4.8–5.6)
HCT VFR BLD AUTO: 41.1 % (ref 37.5–51)
HDLC SERPL-MCNC: 31 MG/DL (ref 40–60)
HGB BLD-MCNC: 14.3 G/DL (ref 13–17.7)
IMM GRANULOCYTES # BLD AUTO: 0.02 10*3/MM3 (ref 0–0.05)
IMM GRANULOCYTES NFR BLD AUTO: 0.4 % (ref 0–0.5)
LDLC SERPL CALC-MCNC: 53 MG/DL (ref 0–100)
LDLC/HDLC SERPL: 1.68 {RATIO}
LYMPHOCYTES # BLD AUTO: 1.47 10*3/MM3 (ref 0.7–3.1)
LYMPHOCYTES NFR BLD AUTO: 27.6 % (ref 19.6–45.3)
MCH RBC QN AUTO: 29.3 PG (ref 26.6–33)
MCHC RBC AUTO-ENTMCNC: 34.8 G/DL (ref 31.5–35.7)
MCV RBC AUTO: 84.2 FL (ref 79–97)
MONOCYTES # BLD AUTO: 0.73 10*3/MM3 (ref 0.1–0.9)
MONOCYTES NFR BLD AUTO: 13.7 % (ref 5–12)
NEUTROPHILS NFR BLD AUTO: 2.86 10*3/MM3 (ref 1.7–7)
NEUTROPHILS NFR BLD AUTO: 53.7 % (ref 42.7–76)
NRBC BLD AUTO-RTO: 0 /100 WBC (ref 0–0.2)
PLATELET # BLD AUTO: 178 10*3/MM3 (ref 140–450)
PMV BLD AUTO: 10 FL (ref 6–12)
POTASSIUM SERPL-SCNC: 3.5 MMOL/L (ref 3.5–5.2)
RBC # BLD AUTO: 4.88 10*6/MM3 (ref 4.14–5.8)
SODIUM SERPL-SCNC: 138 MMOL/L (ref 136–145)
TRIGL SERPL-MCNC: 104 MG/DL (ref 0–150)
VLDLC SERPL-MCNC: 20 MG/DL (ref 5–40)
WBC NRBC COR # BLD: 5.32 10*3/MM3 (ref 3.4–10.8)

## 2023-03-18 PROCEDURE — 80061 LIPID PANEL: CPT | Performed by: HOSPITALIST

## 2023-03-18 PROCEDURE — 70551 MRI BRAIN STEM W/O DYE: CPT

## 2023-03-18 PROCEDURE — 85025 COMPLETE CBC W/AUTO DIFF WBC: CPT | Performed by: HOSPITALIST

## 2023-03-18 PROCEDURE — 97165 OT EVAL LOW COMPLEX 30 MIN: CPT

## 2023-03-18 PROCEDURE — 82962 GLUCOSE BLOOD TEST: CPT

## 2023-03-18 PROCEDURE — 83036 HEMOGLOBIN GLYCOSYLATED A1C: CPT | Performed by: HOSPITALIST

## 2023-03-18 PROCEDURE — 93306 TTE W/DOPPLER COMPLETE: CPT

## 2023-03-18 PROCEDURE — 99233 SBSQ HOSP IP/OBS HIGH 50: CPT | Performed by: FAMILY MEDICINE

## 2023-03-18 PROCEDURE — 80048 BASIC METABOLIC PNL TOTAL CA: CPT | Performed by: HOSPITALIST

## 2023-03-18 PROCEDURE — 94799 UNLISTED PULMONARY SVC/PX: CPT

## 2023-03-18 RX ORDER — LABETALOL HYDROCHLORIDE 5 MG/ML
10 INJECTION, SOLUTION INTRAVENOUS EVERY 4 HOURS PRN
Status: DISCONTINUED | OUTPATIENT
Start: 2023-03-18 | End: 2023-03-20 | Stop reason: HOSPADM

## 2023-03-18 RX ADMIN — TICAGRELOR 60 MG: 60 TABLET ORAL at 20:30

## 2023-03-18 RX ADMIN — Medication 10 ML: at 20:30

## 2023-03-18 RX ADMIN — ATORVASTATIN CALCIUM 80 MG: 40 TABLET, FILM COATED ORAL at 20:30

## 2023-03-18 RX ADMIN — TICAGRELOR 60 MG: 60 TABLET ORAL at 08:12

## 2023-03-18 RX ADMIN — Medication 10 ML: at 08:13

## 2023-03-18 RX ADMIN — ASPIRIN 81 MG 81 MG: 81 TABLET ORAL at 08:13

## 2023-03-18 RX ADMIN — TAMSULOSIN HYDROCHLORIDE 0.4 MG: 0.4 CAPSULE ORAL at 08:13

## 2023-03-18 NOTE — DISCHARGE INSTRUCTIONS
STOP: IV Contrast and Metformin Administration    Metformin should temporarily be held for 48 hours after IV contrast administration, based on your site’s metformin policy.

## 2023-03-18 NOTE — THERAPY EVALUATION
Patient Name: Lester Keating  : 1960    MRN: [id number]                              Today's Date: 3/18/2023       Admit Date: 3/17/2023    Visit Dx:     ICD-10-CM ICD-9-CM   1. Cerebrovascular accident (CVA), unspecified mechanism (HCC)  I63.9 434.91   2. Decreased activities of daily living (ADL)  Z78.9 V49.89     Patient Active Problem List   Diagnosis   • At risk of disease   • Benign essential HTN   • Current smoker   • Type 2 diabetes mellitus (HCC)   • Dizziness   • Hyperlipidemia   • Hypertension   • Left arm numbness   • Screening for colon cancer   • Stroke-like symptoms   • Cerebrovascular accident (CVA), unspecified mechanism (HCC)     Past Medical History:   Diagnosis Date   • Diabetes mellitus (HCC)    • Erectile dysfunction    • Hypertension    • Urinary urgency      Past Surgical History:   Procedure Laterality Date   • APPENDECTOMY        General Information     Row Name 23 1206          OT Time and Intention    Document Type evaluation  -AC     Mode of Treatment individual therapy;occupational therapy  -     Row Name 23 1206          General Information    Patient Profile Reviewed yes  -AC     Prior Level of Function independent:;all household mobility;community mobility;ADL's;driving;work  -AC     Existing Precautions/Restrictions no known precautions/restrictions  -AC     Barriers to Rehab none identified  -AC     Row Name 23 1206          Occupational Profile    Reason for Services/Referral (Occupational Profile) Pt. is a 62year old male admitted for the above diagnosis. Pt. referred to OT services to assess independence with ADLs and adl transfers/fx'l mobility. No previous OT services for current condition.  -AC     Row Name 23 1206          Living Environment    People in Home spouse  -AC     Row Name 23 1206          Cognition    Orientation Status (Cognition) oriented x 4  -AC     Row Name 23 1206          Safety Issues, Functional Mobility     Impairments Affecting Function (Mobility) strength;coordination;sensation/sensory awareness  -           User Key  (r) = Recorded By, (t) = Taken By, (c) = Cosigned By    Initials Name Provider Type    Libia Mackey OT Occupational Therapist                 Mobility/ADL's     Row Name 03/18/23 1207          Bed Mobility    Bed Mobility bed mobility (all) activities  -     All Activities, [place] (Bed Mobility) standby assist  -     Row Name 03/18/23 1207          Transfers    Transfers sit-stand transfer;stand-sit transfer  -     Row Name 03/18/23 1207          Sit-Stand Transfer    Sit-Stand [place] (Transfers) independent  -     Row Name 03/18/23 1207          Stand-Sit Transfer    Stand-Sit [place] (Transfers) independent  -     Row Name 03/18/23 1207          Functional Mobility    Functional Mobility- Ind. Level independent  -     Functional Mobility- Comment patient was (I) with functional mobility without AD for approx 8 step from/to EOB without loss of balance.  -     Row Name 03/18/23 1207          Activities of Daily Living    BADL Assessment/Intervention --  Patient is SBA for bathing/dressing, SBA for toileting, SBA for grooming, SBA for self-feeding  -           User Key  (r) = Recorded By, (t) = Taken By, (c) = Cosigned By    Initials Name Provider Type    Libia Mackey OT Occupational Therapist               Obj/Interventions     Row Name 03/18/23 1209          Sensory Assessment (Somatosensory)    Sensory Assessment (Somatosensory) left UE  -     Sensory Subjective Reports numbness  -     Sensory Assessment Patient states LUE impaired since covid shot 2 years ago.  -     Row Name 03/18/23 1209          Vision Assessment/Intervention    Visual Impairment/Limitations WFL  -     Row Name 03/18/23 1209          Range of Motion Comprehensive    General Range of Motion bilateral upper extremity ROM L  -     Row Name 03/18/23 1209          Strength  Comprehensive (MMT)    Comment, General Manual Muscle Testing (MMT) Assessment LUE 4+/5, patient states LUE impaired since covid shot 2 years ago  -     Row Name 03/18/23 1209          Motor Skills    Motor Skills coordination;functional endurance  -     Coordination left;upper extremity;fine motor deficit;gross motor deficit;minimal impairment  -     Functional Endurance fair plus/good, patient reports dropping utensils, wife states he did this at home. LUE impaired since covid shot 2 years ago, per patient  -     Row Name 03/18/23 1209          Balance    Balance Assessment standing dynamic balance  -     Dynamic Standing Balance independent  -     Position/Device Used, Standing Balance unsupported  -           User Key  (r) = Recorded By, (t) = Taken By, (c) = Cosigned By    Initials Name Provider Type    Libia Mackey OT Occupational Therapist               Goals/Plan    No documentation.                Clinical Impression     Row Name 03/18/23 1210          Pain Assessment    Pretreatment Pain Rating 0/10 - no pain  -     Posttreatment Pain Rating 0/10 - no pain  -     Row Name 03/18/23 1210          Plan of Care Review    Plan of Care Reviewed With patient;spouse  -     Progress no change  -     Outcome Evaluation Patient not appropriate for inpatient skilled OT services at this time as patient has not had a decline in ADLs or ADL transfers/fx'l mobility, patient does have decreased coordination/strength of LUE which he reports has been present since covid shot 2 years ago. Patient safe to d/c to previous setting when medically appropriate.  Patient may benefit from outpatient hand therapy services following hospital discharge.  Discharge inpatient occupational therapy services.  -     Row Name 03/18/23 1210          Therapy Assessment/Plan (OT)    Patient/Family Therapy Goal Statement (OT) NA  -AC     Rehab Potential (OT) --  NA  -     Criteria for Skilled Therapeutic  Interventions Met (OT) no;no problems identified which require skilled intervention  -AC     Therapy Frequency (OT) evaluation only  -AC     Row Name 03/18/23 1210          Therapy Plan Review/Discharge Plan (OT)    Anticipated Discharge Disposition (OT) home;home with outpatient therapy services  -     Row Name 03/18/23 1210          Positioning and Restraints    Pre-Treatment Position in bed  -AC     Post Treatment Position bed  -AC     In Bed call light within reach;encouraged to call for assist;exit alarm on;sitting EOB;with family/caregiver  -           User Key  (r) = Recorded By, (t) = Taken By, (c) = Cosigned By    Initials Name Provider Type    AC Libia Vera, OT Occupational Therapist               Outcome Measures     Row Name 03/18/23 1217          How much help from another is currently needed...    Putting on and taking off regular lower body clothing? 3  -AC     Bathing (including washing, rinsing, and drying) 3  -AC     Toileting (which includes using toilet bed pan or urinal) 3  -AC     Putting on and taking off regular upper body clothing 3  -AC     Taking care of personal grooming (such as brushing teeth) 3  -AC     Eating meals 3  -AC     AM-PAC 6 Clicks Score (OT) 18  -AC     Row Name 03/18/23 0747          How much help from another person do you currently need...    Turning from your back to your side while in flat bed without using bedrails? 4  -BG     Moving from lying on back to sitting on the side of a flat bed without bedrails? 4  -BG     Moving to and from a bed to a chair (including a wheelchair)? 4  -BG     Standing up from a chair using your arms (e.g., wheelchair, bedside chair)? 4  -BG     Climbing 3-5 steps with a railing? 4  -BG     To walk in hospital room? 4  -BG     AM-PAC 6 Clicks Score (PT) 24  -BG     Highest level of mobility 8 --> Walked 250 feet or more  -BG     Row Name 03/18/23 1217          Functional Assessment    Outcome Measure Options AM-PAC 6 Clicks Daily  Activity (OT);Optimal Instrument  -AC     Row Name 03/18/23 1217          Optimal Instrument    Optimal Instrument Optimal - 3  -AC     Bending/Stooping 1  -AC     Standing 1  -AC     Reaching 2  -AC     From the list, choose the 3 activities you would most like to be able to do without any difficulty Bending/stooping;Standing;Reaching  -AC     Total Score Optimal - 3 4  -AC           User Key  (r) = Recorded By, (t) = Taken By, (c) = Cosigned By    Initials Name Provider Type    Karin Aden, RN Registered Nurse    Libia Mackey OT Occupational Therapist                Occupational Therapy Education     Title: PT OT SLP Therapies (Done)     Topic: Occupational Therapy (Done)     Point: ADL training (Done)     Description:   Instruct learner(s) on proper safety adaptation and remediation techniques during self care or transfers.   Instruct in proper use of assistive devices.              Learning Progress Summary           Patient Acceptance, E, VU by AC at 3/18/2023 1217   Significant Other Acceptance, E, VU by  at 3/18/2023 1217                   Point: Home exercise program (Done)     Description:   Instruct learner(s) on appropriate technique for monitoring, assisting and/or progressing therapeutic exercises/activities.              Learning Progress Summary           Patient Acceptance, E, VU by AC at 3/18/2023 1217   Significant Other Acceptance, E, VU by AC at 3/18/2023 1217                   Point: Precautions (Done)     Description:   Instruct learner(s) on prescribed precautions during self-care and functional transfers.              Learning Progress Summary           Patient Acceptance, E, VU by AC at 3/18/2023 1217   Significant Other Acceptance, E, VU by AC at 3/18/2023 1217                   Point: Body mechanics (Done)     Description:   Instruct learner(s) on proper positioning and spine alignment during self-care, functional mobility activities and/or exercises.              Learning  Progress Summary           Patient Acceptance, E, VU by  at 3/18/2023 1217   Significant Other Acceptance, E, VU by  at 3/18/2023 1217                               User Key     Initials Effective Dates Name Provider Type Discipline     06/16/21 -  Libia Vera OT Occupational Therapist OT              OT Recommendation and Plan  Therapy Frequency (OT): evaluation only  Plan of Care Review  Plan of Care Reviewed With: patient, spouse  Progress: no change  Outcome Evaluation: Patient not appropriate for inpatient skilled OT services at this time as patient has not had a decline in ADLs or ADL transfers/fx'l mobility, patient does have decreased coordination/strength of LUE which he reports has been present since covid shot 2 years ago. Patient safe to d/c to previous setting when medically appropriate.  Patient may benefit from outpatient hand therapy services following hospital discharge.  Discharge inpatient occupational therapy services.     Time Calculation:    Time Calculation- OT     Row Name 03/18/23 1218             Time Calculation- OT    OT Received On 03/18/23  -AC         Untimed Charges    OT Eval/Re-eval Minutes 30  -AC         Total Minutes    Untimed Charges Total Minutes 30  -AC       Total Minutes 30  -AC            User Key  (r) = Recorded By, (t) = Taken By, (c) = Cosigned By    Initials Name Provider Type     Libia Vera OT Occupational Therapist              Therapy Charges for Today     Code Description Service Date Service Provider Modifiers Qty    [id number] HC OT EVAL LOW COMPLEXITY 2 3/18/2023 Libia Vera OT GO 1               Libia Vera OT  3/18/2023

## 2023-03-18 NOTE — PLAN OF CARE
Goal Outcome Evaluation:               Pt doing well, no changes this shift, mri done pending results, awaiting neuro to see him. Will continue to monitor

## 2023-03-18 NOTE — PLAN OF CARE
Goal Outcome Evaluation:  Plan of Care Reviewed With: patient, spouse           Outcome Evaluation: Patient reported that he is independent with all ADLs and transfers in his room.  He does not report any deficits with strength or balance either.  He reported that he does not need inpatient PT services and wanted to go back to sleep.  Evaluation order is being discharged.

## 2023-03-18 NOTE — PLAN OF CARE
Goal Outcome Evaluation:  Plan of Care Reviewed With: patient, spouse        Progress: improving  Outcome Evaluation: Alert and oriented x 4,  NIH=0.  Neuro checks WNL and unchanged throughout shift.  Blood pressure elevated.  Still reporting slight amount of dizziness when standing and ambulating. MRI screening completed.  Education given on Stroke and comorbidites, verbalized understanding, booklets given.  Wife at bedside all of shift.  TM

## 2023-03-18 NOTE — PLAN OF CARE
Goal Outcome Evaluation:  Plan of Care Reviewed With: patient, spouse        Progress: no change  Outcome Evaluation: Patient not appropriate for inpatient skilled OT services at this time as patient has not had a decline in ADLs or ADL transfers/fx'l mobility, patient does have decreased coordination/strength of LUE which he reports has been present since covid shot 2 years ago. Patient safe to d/c to previous setting when medically appropriate.  Patient may benefit from outpatient hand therapy services following hospital discharge.  Discharge inpatient occupational therapy services.

## 2023-03-18 NOTE — PROGRESS NOTES
[facility]   Hospitalist Progress Note  Date: 3/18/2023  Patient Name: Lester Keating  : 1960  MRN: [id number]  Date of admission: 3/17/2023      Subjective   Subjective     Chief complaint: Left-sided weakness, left eye vision changes    Summary:  62-year-old male with history of diabetes, hypertension, history of TIA, history of multiple prior lacunar strokes, left pontine, left anterior corpus callosum, has had tPA in the past, left frontal stroke, chronic hemorrhage on prior MRI brain, diabetes mellitus, prior left-sided weakness primarily his left lower extremity which was reportedly resolved, difficulty ambulating, left numbness and weakness to his face, has had diagnosis of TIAs in the past, has been on aspirin, changed to Plavix prior to hospitalization, CT head with encephalomalacia consistent with old infarctions, CT angio of the head shows no focal stenosis or large vessel occlusions, CTA of the neck shows mild calcification in the right carotid bulb and mild to moderate disease in the left carotid bulb, no hemodynamically significant stenosis, MRI pending, was not a candidate for tPA    Interval follow-up: Patient seen and examined this morning, no acute distress, no acute major night events, patient standing against the heater in the room, still has weakness of his right lower extremity, denies fevers, chills, sweats.  No headache or dizziness or lightheadedness.  No new focal neurological weaknesses.  Telemetry reviewed, no acute major arrhythmic events, awaiting MRI brain to further characterize symptoms, he is already on aspirin, he was resumed on Brilinta, he is on high intensity statin    Review of systems:  All systems reviewed and negative except for left lower extremity weakness    Objective   Objective     Vitals:   Temp:  [97.2 °F (36.2 °C)-98.3 °F (36.8 °C)] 97.2 °F (36.2 °C)  Heart Rate:  [62-85] 62  Resp:  [18-20] 18  BP: (150-214)/() 178/90  Physical Exam    Constitutional:  Awake, alert, no acute distress standing   Eyes: Pupils equal, sclerae anicteric, no conjunctival injection   HENT: NCAT, mucous membranes moist   Neck: Supple, no thyromegaly, no lymphadenopathy, trachea midline   Respiratory: Clear to auscultation bilaterally, nonlabored respirations    Cardiovascular: RRR, no murmurs, rubs, or gallops, palpable pedal pulses bilaterally   Gastrointestinal: Positive bowel sounds, soft, nontender, nondistended   Musculoskeletal: No bilateral ankle edema, no clubbing or cyanosis to extremities   Psychiatric: Appropriate affect, cooperative   Neurologic: Oriented x 3, strength symmetric in all extremities except his left leg which is 4 out of 5 strength, Cranial Nerves grossly intact to confrontation, speech clear   Skin: No rashes visible on exposed skin  Result Review    Result Review:  I have personally reviewed the pertinent results from the past 24 hours to 3/18/2023 10:37 EDT and agree with these findings:  [x]  Laboratory   CBC    CBC 3/17/23 3/18/23   WBC 6.47 5.32   RBC 5.38 4.88   Hemoglobin 15.5 14.3   Hematocrit 45.6 41.1   MCV 84.8 84.2   MCH 28.8 29.3   MCHC 34.0 34.8   RDW 13.7 13.5   Platelets 212 178           BMP    BMP 3/17/23 3/18/23   BUN 19 19   Creatinine 1.22 0.93   Sodium 140 138   Potassium 3.9 3.5   Chloride 102 105   CO2 26.3 23.7   Calcium 9.7 9.2           LIVER FUNCTION TESTS:      Lab 03/17/23  1359   TOTAL PROTEIN 7.5   ALBUMIN 4.6   GLOBULIN 2.9   ALT (SGPT) 28   AST (SGOT) 25   BILIRUBIN 0.5   ALK PHOS 140*       [x]  Microbiology No results found for: ACANTHNAEG, AFBCX, BPERTUSSISCX, BLOODCX  No results found for: BCIDPCR, CXREFLEX, CSFCX, CULTURETIS  No results found for: CULTURES, HSVCX, URCX  No results found for: EYECULTURE, GCCX, HSVCULTURE, LABHSV  No results found for: LEGIONELLA, MRSACX, MUMPSCX, MYCOPLASCX  No results found for: NOCARDIACX, STOOLCX  No results found for: THROATCX, UNSTIMCULT, URINECX, CULTURE, VZVCULTUR  No results found  for: VIRALCULTU, WOUNDCX    [x]  Radiology CT Angiogram Neck    Result Date: 3/17/2023  PROCEDURE: CT ANGIOGRAM HEAD W AI ANALYSIS OF LVO, 3/17/2023, 14:13 CT ANGIOGRAM NECK, 3/17/2023, 14:13  COMPARISON: [facility], CT, CT CEREBRAL PERFUSION W WO CONTRAST, 3/17/2023, 14:06.  [facility], CT, CT HEAD WO CONTRAST STROKE PROTOCOL, 3/17/2023, 13:49.  INDICATIONS: Neuro deficit, acute, stroke suspected  PROTOCOL:   Standard imaging protocol performed    RADIATION:   DLP: 1104 mGy*cm   Automated exposure control was utilized to minimize radiation dose. CONTRAST: 75 cc Isovue 370 I.V. RAPID: CTA imaging was analyzed using RAPID AI to enable computer assisted triage notification to rapidly detect a large vessel occlusion (LVO) and shorten notification time  TECHNIQUE: After obtaining the patient's consent, CT images of the head were obtained without and with non-ionic contrast, and multi-planar/3-D imaging were created and interpreted to optimize visualization of vascular anatomy.   FINDINGS:  Vascular findings: CTA head:  The right P1 segment of the posterior cerebral artery is diffusely small with diffusely prominent right posterior communicating artery, this is anatomic variation.  No large vessel occlusions or significant focal stenosis or aneurysms are identified.  There is moderate degree of calcification in the intracranial portions of the ICAs bilaterally mild luminal irregularity in the distal right M1 segment of the middle cerebral artery.  There is mild luminal irregularity in the the left PCA without significant focal stenosis.  The right vertebral artery is dominant with mild to moderate diffuse atherosclerotic disease.  The basilar artery is patent with mild luminal irregularity.  CTA neck: Right-sided findings:  The right common carotid artery are widely patent without disease.  There is a mild degree of calcification in the carotid bulb.  The right external carotid artery and  cervical portion of the right ICA are widely patent without significant disease.  The left vertebral artery is non dominant and is widely patent.  Left-sided findings:  The brachiocephalic artery is mildly tortuous mild diffuse disease.  The right common carotid artery is patent.  There is mild to moderate amount of atherosclerotic disease in the carotid bulb.  The right internal carotid artery is mildly tortuous with mild diffuse disease.  No hemodynamically significant stenosis by NASCET criteria is seen.  The right vertebral artery is dominant and wide and widely patent.  Nonvascular findings.  No pathologic enhancement is seen in the brain.  There is a areas old lacunar infarcts and chronic small vessel ischemic disease as seen on the noncontrast CT.  No cervical adenopathy or acute fluid collections or inflammatory changes are seen in the soft tissues of the neck.  The thyroid gland is diffusely enlarged.  There is degenerative disc disease in the cervical spine.       CTA head:  No significant focal stenosis large vessel occlusions or aneurysms are demonstrated.  CTA neck:  Mild degree of calcification in the right carotid bulb and mild to moderate amount of disease in the left carotid bulb.  No hemodynamically significant stenosis by NASCET criteria.       DANIELLE HERRON DO       Electronically Signed and Approved By: DANIELLE HERRON DO on 3/17/2023 at 15:51             XR Chest 1 View    Result Date: 3/17/2023  PROCEDURE: XR CHEST 1 VW  COMPARISON: None  INDICATIONS: Acute Stroke Protocol (Onset < 12 hrs)  FINDINGS:  Cardiac silhouette is enlarged.  Mediastinal silhouette and pulmonary vascularity is unremarkable.  The lungs are grossly clear.  No pneumothorax or pleural effusions.  Bony structures are intact.       Cardiomegaly.  No active cardiopulmonary disease.       DANIELLE HERRON DO       Electronically Signed and Approved By: DANIELLE HERRON DO on 3/17/2023 at 15:30             CT Head Without Contrast  Stroke Protocol    Result Date: 3/17/2023  PROCEDURE: CT HEAD WO CONTRAST STROKE PROTOCOL  COMPARISON:  None  INDICATIONS: Neuro deficit, acute, stroke suspected  PROTOCOL:   Standard imaging protocol performed    RADIATION:   DLP: 1018.2 mGy*cm   MA and/or KV was adjusted to minimize radiation dose.    TECHNIQUE: CT images were obtained without non-ionic intravenous contrast material.  FINDINGS:  The ventricles, sulci, and cerebellar folia are moderately and diffusely prominent consistent with atrophy.  Ill-defined diminished density in cerebral white matter is consistent with moderate gliosis and/or numerous lacunar infarcts.  1 cm area of encephalomalacia in the region of the head of the caudate nucleus on the left and 7 mm area of encephalomalacia in right periventricular white matter are consistent with old infarcts.  There is no CT evidence of acute intracranial hemorrhage, mass, or mass effect.  The orbits have a normal appearance.  The paranasal sinuses, middle ears, and mastoid air cells are well aerated.  No fractures are seen on bone window images.        CT scan of the head without IV contrast demonstrating diffuse atrophy and white matter changes.  Areas of encephalomalacia are consistent with old infarcts.     MALATHI HEBERT MD       Electronically Signed and Approved By: MALATHI HEBERT MD on 3/17/2023 at 13:57             CT Angiogram Head w AI Analysis of LVO    Result Date: 3/17/2023  PROCEDURE: CT ANGIOGRAM HEAD W AI ANALYSIS OF LVO, 3/17/2023, 14:13 CT ANGIOGRAM NECK, 3/17/2023, 14:13  COMPARISON: [facility], CT, CT CEREBRAL PERFUSION W WO CONTRAST, 3/17/2023, 14:06.  [facility], CT, CT HEAD WO CONTRAST STROKE PROTOCOL, 3/17/2023, 13:49.  INDICATIONS: Neuro deficit, acute, stroke suspected  PROTOCOL:   Standard imaging protocol performed    RADIATION:   DLP: 1104 mGy*cm   Automated exposure control was utilized to minimize radiation dose. CONTRAST: 75 cc Isovue 370 I.V.  RAPID: CTA imaging was analyzed using RAPID AI to enable computer assisted triage notification to rapidly detect a large vessel occlusion (LVO) and shorten notification time  TECHNIQUE: After obtaining the patient's consent, CT images of the head were obtained without and with non-ionic contrast, and multi-planar/3-D imaging were created and interpreted to optimize visualization of vascular anatomy.   FINDINGS:  Vascular findings: CTA head:  The right P1 segment of the posterior cerebral artery is diffusely small with diffusely prominent right posterior communicating artery, this is anatomic variation.  No large vessel occlusions or significant focal stenosis or aneurysms are identified.  There is moderate degree of calcification in the intracranial portions of the ICAs bilaterally mild luminal irregularity in the distal right M1 segment of the middle cerebral artery.  There is mild luminal irregularity in the the left PCA without significant focal stenosis.  The right vertebral artery is dominant with mild to moderate diffuse atherosclerotic disease.  The basilar artery is patent with mild luminal irregularity.  CTA neck: Right-sided findings:  The right common carotid artery are widely patent without disease.  There is a mild degree of calcification in the carotid bulb.  The right external carotid artery and cervical portion of the right ICA are widely patent without significant disease.  The left vertebral artery is non dominant and is widely patent.  Left-sided findings:  The brachiocephalic artery is mildly tortuous mild diffuse disease.  The right common carotid artery is patent.  There is mild to moderate amount of atherosclerotic disease in the carotid bulb.  The right internal carotid artery is mildly tortuous with mild diffuse disease.  No hemodynamically significant stenosis by NASCET criteria is seen.  The right vertebral artery is dominant and wide and widely patent.  Nonvascular findings.  No  pathologic enhancement is seen in the brain.  There is a areas old lacunar infarcts and chronic small vessel ischemic disease as seen on the noncontrast CT.  No cervical adenopathy or acute fluid collections or inflammatory changes are seen in the soft tissues of the neck.  The thyroid gland is diffusely enlarged.  There is degenerative disc disease in the cervical spine.       CTA head:  No significant focal stenosis large vessel occlusions or aneurysms are demonstrated.  CTA neck:  Mild degree of calcification in the right carotid bulb and mild to moderate amount of disease in the left carotid bulb.  No hemodynamically significant stenosis by NASCET criteria.       DANIELLE HERRON DO       Electronically Signed and Approved By: DANIELLE HERRON DO on 3/17/2023 at 15:51             CT CEREBRAL PERFUSION WITH & WITHOUT CONTRAST    Result Date: 3/17/2023  PROCEDURE: CT CEREBRAL PERFUSION W WO CONTRAST  COMPARISON: None  INDICATIONS: Neuro deficit, acute, stroke suspected  PROTOCOL:   Standard imaging protocol performed    RADIATION:   DLP: 1299.6 mGy*cm   Automated exposure control was utilized to minimize radiation dose. CONTRAST: 80 cc Isovue 370 I.V.   FINDINGS:  Nonspecific perfusion abnormality is seen, however, no core infarct or tissue at risk is evident.        CT cerebral perfusion study demonstrating nonspecific perfusion abnormality, as above.      MALATHI HEBERT MD       Electronically Signed and Approved By: MALATHI HEBERT MD on 3/17/2023 at 15:29               [x]  EKG/Telemetry   []  Cardiology/Vascular   []  Pathology  [x]  Old records  []  Other:    Assessment & Plan   Assessment / Plan     Assessment/Plan:  Assessment:  Acute ischemic infarction with left-sided weakness  History of previous strokes  History of previous TIAs  Diabetes mellitus  Essential hypertension  Dyslipidemia    Plan:  Labs and imaging reviewed  Continue stroke pathway  PT/OT/speech  Diet per speech  Insulin sliding scale  coverage  Permissive hypertension If blood pressure is greater than 220/120 give labetalol 10 mg IV every 4 hours with a goal of 15% reduction in BP during the first 24 hours.  Follow-up MRI brain  Follow-up echocardiogram  Mobilize per pathway  High intensity statin atorvastatin 80 mg nightly  Aspirin 81 mg daily  Brilinta 60 mg twice a day  Reconciled all other home medications, resumed accordingly  Nicotine patch  A.m. labs  Full code  Telemetry monitoring  DVT prophylaxis with SCDs  Clinical course dictate further management  Discussed with nurse at the bedside  DVT prophylaxis:  Mechanical DVT prophylaxis orders are present.    CODE STATUS:   Medical Intervention Limits: NO intubation (DNI)  Level Of Support Discussed With: Patient  Code Status (Patient has no pulse and is not breathing): No CPR (Do Not Attempt to Resuscitate)  Medical Interventions (Patient has pulse or is breathing): Limited Support  Release to patient: Routine Release        Electronically signed by Eloy Moon MD, 03/18/23, 10:37 AM EDT.    Portions of this documentation were transcribed electronically from a voice recognition software.  I confirm all data accurately represents the service(s) I performed at today's visit.

## 2023-03-19 LAB
ALBUMIN SERPL-MCNC: 3.7 G/DL (ref 3.5–5.2)
ALP SERPL-CCNC: 122 U/L (ref 39–117)
ALT SERPL W P-5'-P-CCNC: 18 U/L (ref 1–41)
ANION GAP SERPL CALCULATED.3IONS-SCNC: 10.5 MMOL/L (ref 5–15)
AST SERPL-CCNC: 14 U/L (ref 1–40)
BASOPHILS # BLD AUTO: 0.03 10*3/MM3 (ref 0–0.2)
BASOPHILS NFR BLD AUTO: 0.6 % (ref 0–1.5)
BH CV ECHO MEAS - AO ROOT DIAM: 4.5 CM
BH CV ECHO MEAS - EDV(MOD-SP2): 171 ML
BH CV ECHO MEAS - EDV(MOD-SP4): 155 ML
BH CV ECHO MEAS - EF(MOD-BP): 43.5 %
BH CV ECHO MEAS - ESV(MOD-SP2): 96 ML
BH CV ECHO MEAS - ESV(MOD-SP4): 88 ML
BH CV ECHO MEAS - IVSD: 1.4 CM
BH CV ECHO MEAS - LA DIMENSION: 3.9 CM
BH CV ECHO MEAS - LAT PEAK E' VEL: 6.7 CM/SEC
BH CV ECHO MEAS - LVIDD: 4.8 CM
BH CV ECHO MEAS - LVIDS: 3.9 CM
BH CV ECHO MEAS - LVOT DIAM: 2 CM
BH CV ECHO MEAS - LVPWD: 1.5 CM
BH CV ECHO MEAS - MED PEAK E' VEL: 4.9 CM/SEC
BH CV ECHO MEAS - MV A MAX VEL: 72 CM/SEC
BH CV ECHO MEAS - MV DEC TIME: 142 MSEC
BH CV ECHO MEAS - MV E MAX VEL: 56 CM/SEC
BH CV ECHO MEAS - MV E/A: 0.8
BH CV ECHO MEAS - RVDD: 2.9 CM
BH CV ECHO MEAS - TAPSE (>1.6): 2.33 CM
BH CV ECHO MEASUREMENTS AVERAGE E/E' RATIO: 9.66
BILIRUB CONJ SERPL-MCNC: <0.2 MG/DL (ref 0–0.3)
BILIRUB INDIRECT SERPL-MCNC: ABNORMAL MG/DL
BILIRUB SERPL-MCNC: 0.4 MG/DL (ref 0–1.2)
BUN SERPL-MCNC: 19 MG/DL (ref 8–23)
BUN/CREAT SERPL: 23.5 (ref 7–25)
CALCIUM SPEC-SCNC: 8.8 MG/DL (ref 8.6–10.5)
CHLORIDE SERPL-SCNC: 107 MMOL/L (ref 98–107)
CO2 SERPL-SCNC: 22.5 MMOL/L (ref 22–29)
CREAT SERPL-MCNC: 0.81 MG/DL (ref 0.76–1.27)
DEPRECATED RDW RBC AUTO: 41.5 FL (ref 37–54)
EGFRCR SERPLBLD CKD-EPI 2021: 99.7 ML/MIN/1.73
EOSINOPHIL # BLD AUTO: 0.22 10*3/MM3 (ref 0–0.4)
EOSINOPHIL NFR BLD AUTO: 4.1 % (ref 0.3–6.2)
ERYTHROCYTE [DISTWIDTH] IN BLOOD BY AUTOMATED COUNT: 13.6 % (ref 12.3–15.4)
GLUCOSE BLDC GLUCOMTR-MCNC: 109 MG/DL (ref 70–99)
GLUCOSE BLDC GLUCOMTR-MCNC: 131 MG/DL (ref 70–99)
GLUCOSE BLDC GLUCOMTR-MCNC: 151 MG/DL (ref 70–99)
GLUCOSE BLDC GLUCOMTR-MCNC: 96 MG/DL (ref 70–99)
GLUCOSE SERPL-MCNC: 110 MG/DL (ref 65–99)
HCT VFR BLD AUTO: 41.3 % (ref 37.5–51)
HGB BLD-MCNC: 14.4 G/DL (ref 13–17.7)
IMM GRANULOCYTES # BLD AUTO: 0.02 10*3/MM3 (ref 0–0.05)
IMM GRANULOCYTES NFR BLD AUTO: 0.4 % (ref 0–0.5)
IVRT: 66 MSEC
LEFT ATRIUM VOLUME INDEX: 13.1 ML/M2
LYMPHOCYTES # BLD AUTO: 1.33 10*3/MM3 (ref 0.7–3.1)
LYMPHOCYTES NFR BLD AUTO: 25 % (ref 19.6–45.3)
MAGNESIUM SERPL-MCNC: 1.9 MG/DL (ref 1.6–2.4)
MAXIMAL PREDICTED HEART RATE: 158 BPM
MCH RBC QN AUTO: 29.1 PG (ref 26.6–33)
MCHC RBC AUTO-ENTMCNC: 34.9 G/DL (ref 31.5–35.7)
MCV RBC AUTO: 83.6 FL (ref 79–97)
MONOCYTES # BLD AUTO: 0.66 10*3/MM3 (ref 0.1–0.9)
MONOCYTES NFR BLD AUTO: 12.4 % (ref 5–12)
NEUTROPHILS NFR BLD AUTO: 3.05 10*3/MM3 (ref 1.7–7)
NEUTROPHILS NFR BLD AUTO: 57.5 % (ref 42.7–76)
NRBC BLD AUTO-RTO: 0 /100 WBC (ref 0–0.2)
PHOSPHATE SERPL-MCNC: 3.6 MG/DL (ref 2.5–4.5)
PLATELET # BLD AUTO: 167 10*3/MM3 (ref 140–450)
PMV BLD AUTO: 9.9 FL (ref 6–12)
POTASSIUM SERPL-SCNC: 3.8 MMOL/L (ref 3.5–5.2)
PROT SERPL-MCNC: 6.2 G/DL (ref 6–8.5)
RBC # BLD AUTO: 4.94 10*6/MM3 (ref 4.14–5.8)
SODIUM SERPL-SCNC: 140 MMOL/L (ref 136–145)
STRESS TARGET HR: 134 BPM
WBC NRBC COR # BLD: 5.31 10*3/MM3 (ref 3.4–10.8)

## 2023-03-19 PROCEDURE — 80048 BASIC METABOLIC PNL TOTAL CA: CPT | Performed by: FAMILY MEDICINE

## 2023-03-19 PROCEDURE — 82962 GLUCOSE BLOOD TEST: CPT

## 2023-03-19 PROCEDURE — 94799 UNLISTED PULMONARY SVC/PX: CPT

## 2023-03-19 PROCEDURE — 99222 1ST HOSP IP/OBS MODERATE 55: CPT | Performed by: INTERNAL MEDICINE

## 2023-03-19 PROCEDURE — 83735 ASSAY OF MAGNESIUM: CPT | Performed by: FAMILY MEDICINE

## 2023-03-19 PROCEDURE — 63710000001 INSULIN LISPRO (HUMAN) PER 5 UNITS: Performed by: HOSPITALIST

## 2023-03-19 PROCEDURE — 80076 HEPATIC FUNCTION PANEL: CPT | Performed by: FAMILY MEDICINE

## 2023-03-19 PROCEDURE — 84100 ASSAY OF PHOSPHORUS: CPT | Performed by: FAMILY MEDICINE

## 2023-03-19 PROCEDURE — 85025 COMPLETE CBC W/AUTO DIFF WBC: CPT | Performed by: FAMILY MEDICINE

## 2023-03-19 PROCEDURE — 99233 SBSQ HOSP IP/OBS HIGH 50: CPT | Performed by: FAMILY MEDICINE

## 2023-03-19 RX ORDER — LISINOPRIL 20 MG/1
40 TABLET ORAL DAILY
Status: DISCONTINUED | OUTPATIENT
Start: 2023-03-19 | End: 2023-03-20 | Stop reason: HOSPADM

## 2023-03-19 RX ORDER — METOPROLOL SUCCINATE 25 MG/1
12.5 TABLET, EXTENDED RELEASE ORAL
Status: DISCONTINUED | OUTPATIENT
Start: 2023-03-19 | End: 2023-03-20

## 2023-03-19 RX ADMIN — ATORVASTATIN CALCIUM 80 MG: 40 TABLET, FILM COATED ORAL at 20:41

## 2023-03-19 RX ADMIN — TICAGRELOR 60 MG: 60 TABLET ORAL at 08:13

## 2023-03-19 RX ADMIN — Medication 10 ML: at 08:14

## 2023-03-19 RX ADMIN — TICAGRELOR 60 MG: 60 TABLET ORAL at 20:41

## 2023-03-19 RX ADMIN — INSULIN LISPRO 2 UNITS: 100 INJECTION, SOLUTION INTRAVENOUS; SUBCUTANEOUS at 17:09

## 2023-03-19 RX ADMIN — METOPROLOL SUCCINATE 12.5 MG: 25 TABLET, EXTENDED RELEASE ORAL at 12:32

## 2023-03-19 RX ADMIN — LISINOPRIL 40 MG: 20 TABLET ORAL at 08:53

## 2023-03-19 RX ADMIN — ASPIRIN 81 MG 81 MG: 81 TABLET ORAL at 08:14

## 2023-03-19 RX ADMIN — TAMSULOSIN HYDROCHLORIDE 0.4 MG: 0.4 CAPSULE ORAL at 08:14

## 2023-03-19 RX ADMIN — Medication 10 ML: at 20:41

## 2023-03-19 NOTE — CONSULTS
[facility]   Cardiology Consult Note    Patient Name: Lester Keating  : 1960  MRN: [id number]  Primary Care Physician:  Carlos Townsend APRN  Referring Physician: No ref. provider found  Date of admission: 3/17/2023    Subjective   Subjective     Reason for Consult/ Chief Complaint: Abnormal echocardiogram    HPI:  Lester Keating is a 62 y.o. male with history of multiple TIAs and lacunar infarcts.  He presented with left-sided weakness that had been on and off for up to a week.  He was seen in the hospital in 2019 with a right thalamic stroke.  He was seen by a neurologist 2019.  He was on aspirin and Plavix at that time but was switched just to aspirin.  He apparently at some time was put back on Plavix.  He was then in [facility] in 2022 range with similar symptoms and had his Plavix switched to Brilinta.  When he came to the hospital his blood pressure was 190/93 up to 214/114.  He states he is taking his blood pressure pills each day.  He does not check his blood pressure.  There is a note from his primary care physician that he was told that he needs to make a follow-up appointment as he has been calling in for refills.  Per the note he refused to make a follow-up appointment at that time.  He denies any shortness of breath or orthopnea.    Review of Systems   All systems were reviewed and negative except for: Left-sided weakness    Personal History     Past Medical History:   Diagnosis Date   • Diabetes mellitus (HCC)    • Erectile dysfunction    • Hypertension    • Urinary urgency         Past medical history reviewed      Family History: family history is not on file. Otherwise pertinent FHx was reviewed and not pertinent to current issue.    Social History:  reports that he quit smoking about 8 months ago. His smoking use included cigarettes. He started smoking about 41 years ago. He has a 40.00 pack-year smoking history. He has never used smokeless tobacco. He reports  current alcohol use. He reports that he does not use drugs.    Home Medications:  B-12, Dulaglutide, atorvastatin, empagliflozin, lisinopril, metFORMIN, nicotine, tamsulosin, and ticagrelor    Allergies:  No Known Allergies    Objective    Objective     Vitals:   Temp:  [97.3 °F (36.3 °C)-98.8 °F (37.1 °C)] 97.6 °F (36.4 °C)  Heart Rate:  [64-72] 66  Resp:  [20] 20  BP: (140-182)/(60-92) 140/88      Physical Exam:   Constitutional: Awake, alert, No acute distress    Eyes: PERRLA, sclerae anicteric, no conjunctival injection   HENT: NCAT, mucous membranes moist   Neck: Supple, no thyromegaly, no lymphadenopathy, trachea midline   Respiratory: Clear to auscultation bilaterally, nonlabored respirations    Cardiovascular: RRR, no murmurs, rubs, or gallops, palpable pedal pulses bilaterally   Gastrointestinal: Positive bowel sounds, soft, nontender, nondistended   Musculoskeletal: No bilateral ankle edema, no clubbing or cyanosis to extremities   Psychiatric: Appropriate affect, cooperative   Neurologic: Oriented x 3, strength symmetric in all extremities, Cranial Nerves grossly intact to confrontation, speech clear   Skin: No rashes     Result Review    Result Review:  I have personally reviewed the results from the time of this admission to 3/19/2023 12:37 EDT and agree with these findings:  [x]  Laboratory  []  Microbiology  [x]  Radiology  [x]  EKG/Telemetry   [x]  Cardiology/Vascular   []  Pathology  [x]  Old records  []  Other:  Most notable findings include:     CMP    CMP 3/17/23 3/18/23 3/19/23 3/19/23      0454 [id number]   Glucose 159 (A) 111 (A)  110 (A)   BUN 19 19  19   Creatinine 1.22 0.93  0.81   eGFR 67.0 92.8  99.7   Sodium 140 138  140   Potassium 3.9 3.5  3.8   Chloride 102 105  107   Calcium 9.7 9.2  8.8   Total Protein 7.5  6.2    Albumin 4.6  3.7    Globulin 2.9      Total Bilirubin 0.5  0.4    Alkaline Phosphatase 140 (A)  122 (A)    AST (SGOT) 25  14    ALT (SGPT) 28  18    Albumin/Globulin Ratio 1.6       BUN/Creatinine Ratio 15.6 20.4  23.5   Anion Gap 11.7 9.3  10.5   (A) Abnormal value             CBC    CBC 3/17/23 3/18/23 3/19/23   WBC 6.47 5.32 5.31   RBC 5.38 4.88 4.94   Hemoglobin 15.5 14.3 14.4   Hematocrit 45.6 41.1 41.3   MCV 84.8 84.2 83.6   MCH 28.8 29.3 29.1   MCHC 34.0 34.8 34.9   RDW 13.7 13.5 13.6   Platelets 212 178 167            Lab Results   Component Value Date    TROPONINT 23 (H) 03/17/2023         Assessment & Plan   Assessment / Plan     Brief Patient Summary:  Lester Keating is a 62 y.o. male who who has had multiple lacunar infarcts.  He presented with an acute lacunar infarct seen on MRI.  His blood pressure was very elevated in the emergency department in the 190/93 up to 214/114 range.  He states he takes his medications every day.  He does not monitor his blood pressure.  There is questions of whether he is not making primary care appointments as they do have a note in there from recently telling him that he needs to make an appointment and they cannot just continue to refill his medications.  In that note he refused to make a follow-up appointment per the note.    Active Hospital Problems:  Active Hospital Problems    Diagnosis    • **Cerebrovascular accident (CVA), unspecified mechanism (HCC)    • Benign essential HTN    • Type 2 diabetes mellitus (HCC)    • Hyperlipidemia        Assessment:  1.  Recurrent lacunar infarcts.  2.  Hypertension  3.  Hyperlipidemia   4.  Abnormal echocardiogram.    Plan:   1.  Patient was on aspirin and Plavix at one point and was switched to aspirin.  Then he was switched again to Plavix but had another episode in July 2022 and apparently was put on Brilinta at that time when he was in the hospital in [place] at [facility].  2.  Patient's blood pressure was extremely elevated when he came into the hospital.  3.  Patient's EKG shows interventricular conduction delay with evidence of left ventricular hypertrophy.  His echocardiogram shows left  ventricular hypertrophy.  This makes me wonder if his blood pressure is under adequate control.  He does not check it at home and it sounds like by a recent telephone note that he is reluctant to come in for primary care appointments.  4.  Looked at patient's echocardiogram.  I probably would have put his ejection fraction either low normal or very minimally decreased at approximately 45 to 50%.  He appears euvolemic.  I agree with the Toprol and the lisinopril.  I would just make sure that his blood pressure remains under good control.  His aortic root is slightly enlarged at 4.5 cm.  The Toprol should also help with this.  He had mild left atrial enlargement.  Sclerotic aortic valve with trace aortic insufficiency and no significant aortic stenosis.  5.  I would just set the patient up with my nurse practitioner Yanet Yo in 4 to 6 weeks after discharge.  We can continue to monitor his blood pressure.  Could also consider repeating either an echocardiogram or a MUGA scan in 4 to 6 months.  Will follow from a distance.  Call with any questions.    Electronically signed by Geoff Rico MD, 03/19/23, 12:37 PM EDT.

## 2023-03-19 NOTE — CONSULTS
Consult received per Stroke Protocol. Patient with a noted DM diagnosis, with a current HbA1c of 6.6%, and an estimated average glucose of 143. Patient's home regimen consists of Jardiance (25mg daily), Metformin (1000mg BID), and Trulicity (0.75mg weekly) .

## 2023-03-19 NOTE — CONSULTS
[facility]   Consult Note      Patient Name: Lester Keating  : 1960  MRN: [id number]  Primary Care Physician:  Carlos Townsend APRN  Date of admission: 3/17/2023    Subjective   Subjective     This 62 years old gentleman was seen upon the request of Eloy Moon MD for evaluation.    Chief Complaint:   Stroke    HPI:  His wife is at the bedside.  He dated the onset of his illness sometime on 2023, when he had an episode wherein he was off balance which improved but did not go away completely until 2023 when his left leg gave way and he fell down.  He thought that he tripped.  He had numbness and tingling sensation in the left hand and fingers as well as the left thigh.  In addition, the left extremities were weak.  She has also some type of symptom in the left face.  He felt that it dropped.  He went to the emergency room and has had a CAT scan of the brain which showed areas of encephalomalacia in the head of the left caudate nucleus, right periventricular white matter consistent with old infarcts.  There were ill-defined diminished density in the cerebral white matter consistent with moderate gliosis and or numerous lacunar infarcts.    Review of Systems  There was no headache, dizziness, nausea or vomiting.  No chest pains or abdominal pains.  He was just off balance.      Past Medical History:   Diagnosis Date   • Diabetes mellitus (HCC)    • Erectile dysfunction    • Hypertension    • Urinary urgency        Past Surgical History:   Procedure Laterality Date   • APPENDECTOMY         Family History: family history is not on file. Otherwise pertinent FHx was reviewed and not pertinent to current issue.    Social History:  reports that he quit smoking about 8 months ago. His smoking use included cigarettes. He started smoking about 41 years ago. He has a 40.00 pack-year smoking history. He has never used smokeless tobacco. He reports current alcohol use. He  reports that he does not use drugs.    Psychosocial History: No known psychiatric difficulties.    Home Medications:  B-12, Dulaglutide, atorvastatin, empagliflozin, lisinopril, metFORMIN, nicotine, tamsulosin, and ticagrelor      Allergies:  No Known Allergies    Vitals:   Temp:  [97.3 °F (36.3 °C)-98.8 °F (37.1 °C)] 97.6 °F (36.4 °C)  Heart Rate:  [64-72] 66  Resp:  [20] 20  BP: (140-182)/(60-92) 140/88  Physical Exam   He was awake and alert was not informed distress was fairly developed and fairly nourished.  The abdomen is slightly protuberant.    His heart was regular heart rate was 60/min.  There were no murmurs..  There were no murmurs.  The lungs were clear.    The carotid pulses were 1+ and equal.  There were no bruits on either side.    Neurological Examination:  's responses were coherent and relevant.  He was aware of what was going on around him.  He has an insight to his condition.  He was able to understand and follow verbal commands.    I did not look into the fundus on either side because of the COVID-19 pandemic social distancing.    The pupils were 3 to 4 mm. They were round and equal reactive to light directly and consensually.  There was no extraocular muscle weakness.    No facial asymmetry.  No facial weakness.  Was able to hear normal conversational speech.    The strength of the sternomastoid and trapezius muscles was normal and symmetrical.  The uvula and the tongue were in the midline.    He has 5% left hemiparesis.    Felt pinprick equal in both sides of the forehead, face, lower jaw, both upper and lower extremities, and both sides of his trunk.    The deep tendon reflexes were hypoactive to absent on both sides.    He had a slight difficulty in doing finger-to-nose test on the left but not on the right.      Result Review    Result Review:  I have personally reviewed the results from the time of this admission to 3/19/2023 12:39 EDT and agree with these findings:  [x]  Laboratory  []   Microbiology  [x]  Radiology  []  EKG/Telemetry   []  Cardiology/Vascular   []  Pathology  []  Old records  []  Other:  Most notable findings include:   March 19, 2023  I reviewed the computer images of the MRI of his brain that was done on March 18, 2023.  This study showed a recent lacunar infarct in the right thalamus and subacute lacunar infarct in the right occipital lobe.  There were old lacunar infarctions noted in the left pontine region, bilateral periventricular white matter more on the left.  There were moderate, chronic, diffuse, subcortical and periventricular microvascular ischemic changes    I reviewed the computer images of the CT angiogram of the neck and cerebral vessels done on March 1723.  This study showed no significant stenosis or narrowing of the carotid and vertebrals and system in the neck and there are branches in the brain.  The left vertebral artery is smaller in caliber than the right.  The terminal portion of the left vertebral artery is small but it is patent.    We have the report of the cerebral perfusion study done on March 17, 2020 the study showed nonspecific perfusion abnormalities.    We have the report of the transthoracic echocardiogram.  This showed mild concentric left ventricular hypertrophy with grade 1 left ventricular impaired relaxation.  Moderate dilatation of aortic root.  No apparent intra cardiac masses, vegetations or thrombi.  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  The neurologic work-up is complete.  I do not have any further recommendation for neurologic work-up.              Please note that portions of this note were completed with a voice recognition program.  Part of this note is an electric or electronic  transcription/translation of spoken language to printed text using the dragon dictating system.    Electronically signed by Carrol Mayorga Jr., MD, 03/19/23, 12:39 PM EDT.

## 2023-03-19 NOTE — PROGRESS NOTES
[facility]   Hospitalist Progress Note  Date: 3/19/2023  Patient Name: Lester Keating  : 1960  MRN: [id number]  Date of admission: 3/17/2023      Subjective   Subjective     Chief complaint: Left-sided weakness, left eye vision changes    Summary:  62-year-old male with history of diabetes, hypertension, history of TIA, history of multiple prior lacunar strokes, left pontine, left anterior corpus callosum, has had tPA in the past, left frontal stroke, chronic hemorrhage on prior MRI brain, diabetes mellitus, prior left-sided weakness primarily his left lower extremity which was reportedly resolved, difficulty ambulating, left numbness and weakness to his face, has had diagnosis of TIAs in the past, has been on aspirin, changed to Plavix prior to hospitalization, CT head with encephalomalacia consistent with old infarctions, CT angio of the head shows no focal stenosis or large vessel occlusions, CTA of the neck shows mild calcification in the right carotid bulb and mild to moderate disease in the left carotid bulb, no hemodynamically significant stenosis, MRI revealed acute stroke involving the right internal capsule posterior limb, was not a candidate for tPA, cardiology consulted after echo came back with abnormalities including diminished EF and diastolic dysfunction; new, started beta-blocker, resumed ACE inhibitor, on aspirin    Interval follow-up: Patient seen and examined this morning, no acute distress, no acute major night events, no new focal neurological weakness, blood pressures remain sharply elevated, systolics reaching the 180s, diastolics in the 90s, resumed home lisinopril, blood pressures did not improve much, added low-dose beta-blocker with heart rate still ranging in the 60s to 70s, monitoring for tolerance, cardiology consulted with abnormal EF and cardiac function on echo, no reported past cardiac issues.  Telemetry reviewed, no acute major rhythmic events.  Denies chest pain or  palpitations.  Left lower extremity weakness remains and is no worse than before.    Review of systems:  All systems reviewed and negative except for left lower extremity weakness  Objective   Objective     Vitals:   Temp:  [97.3 °F (36.3 °C)-98.8 °F (37.1 °C)] 97.6 °F (36.4 °C)  Heart Rate:  [64-72] 66  Resp:  [20] 20  BP: (140-182)/(60-92) 140/88  Physical Exam    Constitutional: Awake, alert, no acute distress, sitting upright on the bed   Eyes: Pupils equal, sclerae anicteric, no conjunctival injection   HENT: NCAT, mucous membranes moist   Neck: Supple, no thyromegaly, no lymphadenopathy, trachea midline   Respiratory: Clear to auscultation bilaterally, nonlabored respirations    Cardiovascular: RRR, no murmurs, rubs, or gallops, palpable pedal pulses bilaterally   Gastrointestinal: Positive bowel sounds, soft, nontender, nondistended   Musculoskeletal: No bilateral ankle edema, no clubbing or cyanosis to extremities   Psychiatric: Appropriate affect, cooperative   Neurologic: Oriented x 3, strength symmetric in all extremities except his left leg which is 4 out of 5 strength, Cranial Nerves grossly intact to confrontation, speech clear   Skin: No rashes visible on exposed skin  Result Review    Result Review:  I have personally reviewed the pertinent results from the past 24 hours to 3/19/2023 12:10 EDT and agree with these findings:  [x]  Laboratory   CBC    CBC 3/17/23 3/18/23 3/19/23   WBC 6.47 5.32 5.31   RBC 5.38 4.88 4.94   Hemoglobin 15.5 14.3 14.4   Hematocrit 45.6 41.1 41.3   MCV 84.8 84.2 83.6   MCH 28.8 29.3 29.1   MCHC 34.0 34.8 34.9   RDW 13.7 13.5 13.6   Platelets 212 178 167           BMP    BMP 3/17/23 3/18/23 3/19/23   BUN 19 19 19   Creatinine 1.22 0.93 0.81   Sodium 140 138 140   Potassium 3.9 3.5 3.8   Chloride 102 105 107   CO2 26.3 23.7 22.5   Calcium 9.7 9.2 8.8           LIVER FUNCTION TESTS:      Lab 03/19/23  0454 03/17/23  1359   TOTAL PROTEIN 6.2 7.5   ALBUMIN 3.7 4.6   GLOBULIN  --   2.9   ALT (SGPT) 18 28   AST (SGOT) 14 25   BILIRUBIN 0.4 0.5   BILIRUBIN DIRECT <0.2  --    ALK PHOS 122* 140*       [x]  Microbiology No results found for: ACANTHNAEG, AFBCX, BPERTUSSISCX, BLOODCX  No results found for: BCIDPCR, CXREFLEX, CSFCX, CULTURETIS  No results found for: CULTURES, HSVCX, URCX  No results found for: EYECULTURE, GCCX, HSVCULTURE, LABHSV  No results found for: LEGIONELLA, MRSACX, MUMPSCX, MYCOPLASCX  No results found for: NOCARDIACX, STOOLCX  No results found for: THROATCX, UNSTIMCULT, URINECX, CULTURE, VZVCULTUR  No results found for: VIRALCULTU, WOUNDCX    [x]  Radiology CT Angiogram Neck    Result Date: 3/17/2023  PROCEDURE: CT ANGIOGRAM HEAD W AI ANALYSIS OF LVO, 3/17/2023, 14:13 CT ANGIOGRAM NECK, 3/17/2023, 14:13  COMPARISON: [facility], CT, CT CEREBRAL PERFUSION W WO CONTRAST, 3/17/2023, 14:06.  [facility], CT, CT HEAD WO CONTRAST STROKE PROTOCOL, 3/17/2023, 13:49.  INDICATIONS: Neuro deficit, acute, stroke suspected  PROTOCOL:   Standard imaging protocol performed    RADIATION:   DLP: 1104 mGy*cm   Automated exposure control was utilized to minimize radiation dose. CONTRAST: 75 cc Isovue 370 I.V. RAPID: CTA imaging was analyzed using RAPID AI to enable computer assisted triage notification to rapidly detect a large vessel occlusion (LVO) and shorten notification time  TECHNIQUE: After obtaining the patient's consent, CT images of the head were obtained without and with non-ionic contrast, and multi-planar/3-D imaging were created and interpreted to optimize visualization of vascular anatomy.   FINDINGS:  Vascular findings: CTA head:  The right P1 segment of the posterior cerebral artery is diffusely small with diffusely prominent right posterior communicating artery, this is anatomic variation.  No large vessel occlusions or significant focal stenosis or aneurysms are identified.  There is moderate degree of calcification in the intracranial portions of  the ICAs bilaterally mild luminal irregularity in the distal right M1 segment of the middle cerebral artery.  There is mild luminal irregularity in the the left PCA without significant focal stenosis.  The right vertebral artery is dominant with mild to moderate diffuse atherosclerotic disease.  The basilar artery is patent with mild luminal irregularity.  CTA neck: Right-sided findings:  The right common carotid artery are widely patent without disease.  There is a mild degree of calcification in the carotid bulb.  The right external carotid artery and cervical portion of the right ICA are widely patent without significant disease.  The left vertebral artery is non dominant and is widely patent.  Left-sided findings:  The brachiocephalic artery is mildly tortuous mild diffuse disease.  The right common carotid artery is patent.  There is mild to moderate amount of atherosclerotic disease in the carotid bulb.  The right internal carotid artery is mildly tortuous with mild diffuse disease.  No hemodynamically significant stenosis by NASCET criteria is seen.  The right vertebral artery is dominant and wide and widely patent.  Nonvascular findings.  No pathologic enhancement is seen in the brain.  There is a areas old lacunar infarcts and chronic small vessel ischemic disease as seen on the noncontrast CT.  No cervical adenopathy or acute fluid collections or inflammatory changes are seen in the soft tissues of the neck.  The thyroid gland is diffusely enlarged.  There is degenerative disc disease in the cervical spine.       CTA head:  No significant focal stenosis large vessel occlusions or aneurysms are demonstrated.  CTA neck:  Mild degree of calcification in the right carotid bulb and mild to moderate amount of disease in the left carotid bulb.  No hemodynamically significant stenosis by NASCET criteria.       DNAIELLE HERRON DO       Electronically Signed and Approved By: DANIELLE HERRON DO on 3/17/2023 at 15:51              XR Chest 1 View    Result Date: 3/17/2023  PROCEDURE: XR CHEST 1 VW  COMPARISON: None  INDICATIONS: Acute Stroke Protocol (Onset < 12 hrs)  FINDINGS:  Cardiac silhouette is enlarged.  Mediastinal silhouette and pulmonary vascularity is unremarkable.  The lungs are grossly clear.  No pneumothorax or pleural effusions.  Bony structures are intact.       Cardiomegaly.  No active cardiopulmonary disease.       DANIELLE HERRON DO       Electronically Signed and Approved By: DANIELLE HERRON DO on 3/17/2023 at 15:30             CT Head Without Contrast Stroke Protocol    Result Date: 3/17/2023  PROCEDURE: CT HEAD WO CONTRAST STROKE PROTOCOL  COMPARISON:  None  INDICATIONS: Neuro deficit, acute, stroke suspected  PROTOCOL:   Standard imaging protocol performed    RADIATION:   DLP: 1018.2 mGy*cm   MA and/or KV was adjusted to minimize radiation dose.    TECHNIQUE: CT images were obtained without non-ionic intravenous contrast material.  FINDINGS:  The ventricles, sulci, and cerebellar folia are moderately and diffusely prominent consistent with atrophy.  Ill-defined diminished density in cerebral white matter is consistent with moderate gliosis and/or numerous lacunar infarcts.  1 cm area of encephalomalacia in the region of the head of the caudate nucleus on the left and 7 mm area of encephalomalacia in right periventricular white matter are consistent with old infarcts.  There is no CT evidence of acute intracranial hemorrhage, mass, or mass effect.  The orbits have a normal appearance.  The paranasal sinuses, middle ears, and mastoid air cells are well aerated.  No fractures are seen on bone window images.        CT scan of the head without IV contrast demonstrating diffuse atrophy and white matter changes.  Areas of encephalomalacia are consistent with old infarcts.     MALATHI HEBERT MD       Electronically Signed and Approved By: MALATHI HEBERT MD on 3/17/2023 at 13:57             CT Angiogram Head w AI Analysis  of LVO    Result Date: 3/17/2023  PROCEDURE: CT ANGIOGRAM HEAD W AI ANALYSIS OF LVO, 3/17/2023, 14:13 CT ANGIOGRAM NECK, 3/17/2023, 14:13  COMPARISON: [facility], CT, CT CEREBRAL PERFUSION W WO CONTRAST, 3/17/2023, 14:06.  [facility], CT, CT HEAD WO CONTRAST STROKE PROTOCOL, 3/17/2023, 13:49.  INDICATIONS: Neuro deficit, acute, stroke suspected  PROTOCOL:   Standard imaging protocol performed    RADIATION:   DLP: 1104 mGy*cm   Automated exposure control was utilized to minimize radiation dose. CONTRAST: 75 cc Isovue 370 I.V. RAPID: CTA imaging was analyzed using RAPID AI to enable computer assisted triage notification to rapidly detect a large vessel occlusion (LVO) and shorten notification time  TECHNIQUE: After obtaining the patient's consent, CT images of the head were obtained without and with non-ionic contrast, and multi-planar/3-D imaging were created and interpreted to optimize visualization of vascular anatomy.   FINDINGS:  Vascular findings: CTA head:  The right P1 segment of the posterior cerebral artery is diffusely small with diffusely prominent right posterior communicating artery, this is anatomic variation.  No large vessel occlusions or significant focal stenosis or aneurysms are identified.  There is moderate degree of calcification in the intracranial portions of the ICAs bilaterally mild luminal irregularity in the distal right M1 segment of the middle cerebral artery.  There is mild luminal irregularity in the the left PCA without significant focal stenosis.  The right vertebral artery is dominant with mild to moderate diffuse atherosclerotic disease.  The basilar artery is patent with mild luminal irregularity.  CTA neck: Right-sided findings:  The right common carotid artery are widely patent without disease.  There is a mild degree of calcification in the carotid bulb.  The right external carotid artery and cervical portion of the right ICA are widely patent  without significant disease.  The left vertebral artery is non dominant and is widely patent.  Left-sided findings:  The brachiocephalic artery is mildly tortuous mild diffuse disease.  The right common carotid artery is patent.  There is mild to moderate amount of atherosclerotic disease in the carotid bulb.  The right internal carotid artery is mildly tortuous with mild diffuse disease.  No hemodynamically significant stenosis by NASCET criteria is seen.  The right vertebral artery is dominant and wide and widely patent.  Nonvascular findings.  No pathologic enhancement is seen in the brain.  There is a areas old lacunar infarcts and chronic small vessel ischemic disease as seen on the noncontrast CT.  No cervical adenopathy or acute fluid collections or inflammatory changes are seen in the soft tissues of the neck.  The thyroid gland is diffusely enlarged.  There is degenerative disc disease in the cervical spine.       CTA head:  No significant focal stenosis large vessel occlusions or aneurysms are demonstrated.  CTA neck:  Mild degree of calcification in the right carotid bulb and mild to moderate amount of disease in the left carotid bulb.  No hemodynamically significant stenosis by NASCET criteria.       DANIELLE HERRON DO       Electronically Signed and Approved By: DANIELLE HERRON DO on 3/17/2023 at 15:51             CT CEREBRAL PERFUSION WITH & WITHOUT CONTRAST    Result Date: 3/17/2023  PROCEDURE: CT CEREBRAL PERFUSION W WO CONTRAST  COMPARISON: None  INDICATIONS: Neuro deficit, acute, stroke suspected  PROTOCOL:   Standard imaging protocol performed    RADIATION:   DLP: 1299.6 mGy*cm   Automated exposure control was utilized to minimize radiation dose. CONTRAST: 80 cc Isovue 370 I.V.   FINDINGS:  Nonspecific perfusion abnormality is seen, however, no core infarct or tissue at risk is evident.        CT cerebral perfusion study demonstrating nonspecific perfusion abnormality, as above.      MALATHI HEBERT,  MD       Electronically Signed and Approved By: MALATHI HEBERT MD on 3/17/2023 at 15:29               [x]  EKG/Telemetry   []  Cardiology/Vascular   []  Pathology  [x]  Old records  []  Other:    Assessment & Plan   Assessment / Plan     Assessment/Plan:  Assessment:  Acute ischemic infarction involving the posterior limb of the right internal capsule with left-sided weakness  New diagnosis of systolic and dysfunction, EF in the 40s  History of previous strokes  History of previous TIAs  Diabetes mellitus  Essential hypertension  Dyslipidemia    Plan:  Labs and imaging reviewed  Cardiology consult discussed with Dr. Rico, follow-up recommendations  Start metoprolol XL 12.5 mg daily; starting at lower dose with heart rate baseline in the 60s and 70s, following closely, goal is to uptitrate to 25 mg if tolerates  Resumed lisinopril to 40 mg daily  Continue aspirin 81 mg daily  Continue Brilinta 60 mg twice a day  Continue telemetry monitoring  Continue goal gradual blood pressure correction  Continue stroke pathway  PT/OT/speech  Diet per speech  Insulin sliding scale coverage while we hold his home diabetic medications which include Trulicity, Jardiance, metformin  Permissive hypertension If blood pressure is greater than 220/120 give labetalol 10 mg IV every 4 hours as required   Follow-up neurology consultation with Dr. Mayorga  Mobilize per pathway  High intensity statin atorvastatin 80 mg nightly continued  Reconciled all other home medications, resumed accordingly  Nicotine patch  A.m. labs  Full code  Telemetry monitoring  DVT prophylaxis with SCDs  Clinical course dictate further management  Discussed with nurse at the bedside  DVT prophylaxis:  Mechanical DVT prophylaxis orders are present.    CODE STATUS:   Medical Intervention Limits: NO intubation (DNI)  Level Of Support Discussed With: Patient  Code Status (Patient has no pulse and is not breathing): No CPR (Do Not Attempt to Resuscitate)  Medical  Interventions (Patient has pulse or is breathing): Limited Support  Release to patient: Routine Release        Electronically signed by Eloy Moon MD, 03/19/23, 12:10 PM EDT.    Portions of this documentation were transcribed electronically from a voice recognition software.  I confirm all data accurately represents the service(s) I performed at today's visit.

## 2023-03-19 NOTE — PAYOR COMM NOTE
"PENDED CASE -NO AUTH NOTED   OBS 3/17  INPT 3/18       Edelmira PENA  [facility] ,[facility]  Ph [phone]  F [phone]        Lester Keating \"Ramon\" (62 y.o. Male)     Date of Birth   1960    Social Security Number       Address   [address] [place] [address]    Home Phone   [phone]    MRN   [id number]       Restorationist   None    Marital Status                               Admission Date   3/17/23    Admission Type   Emergency    Admitting Provider   Teresa Patel MD    Attending Provider   Eloy Moon MD    Department, Room/Bed   [facility] PROGRESSIVE CARE UNIT 2, 259/       Discharge Date       Discharge Disposition       Discharge Destination                               Attending Provider: Eloy Moon MD    Allergies: No Known Allergies    Isolation: None   Infection: None   Code Status: No CPR    Ht: 190.5 cm (75\")   Wt: 117 kg (258 lb 6.1 oz)    Admission Cmt: None   Principal Problem: Cerebrovascular accident (CVA), unspecified mechanism (HCC) [I63.9]                 Active Insurance as of 3/17/2023     Primary Coverage     Payor Plan Insurance Group Employer/Plan Group    [employer] [facility] [employer] EMPLOYEE [id number]     Payor Plan Address Payor Plan Phone Number Payor Plan Fax Number Effective Dates    [phone]  3/1/2023 - None Entered    [place] [id number]       Subscriber Name Subscriber Birth Date Member ID       LESTER KEATING 1960 [id number]                 Emergency Contacts      (Rel.) Home Phone Work Phone Mobile Phone    ADRIAN KEATING (Spouse) [phone] -- --               History & Physical      Teresa Patel MD at 23 1746           [facility]   HOSPITALIST HISTORY AND PHYSICAL  Date: 3/17/2023   Patient Name: Lester Keating  : 1960  MRN: [id number]  Primary Care Physician:  Carlos Townsend, HUE  Date of admission: 3/17/2023    Subjective    Subjective "     Chief Complaint: Left-sided weakness x1 day    HPI:    Lester Keating is a 62 y.o. male with medical history significant for type 2 diabetes, primary hypertension, history of TIA; presents with left-sided weakness x1 day.  Patient reports that an hour before coming to the hospital he started to have left-sided weakness to the point that he was unable to ambulate.  Patient also noted left-sided numbness and weakness of his face with facial droop.  Patient states he has had similar presentations in the past.  Patient states that he had been diagnosed with TIAs in the past.  Patient states he typically will have left-sided weakness with left facial droop.  Patient states he is currently on Brilinta secondary to TIAs in the past.  Patient states he had been on Plavix and was changed to Brilinta after having 1 of these TIAs.  Patient does report having runny nose, itchy eyes and sore throat which he states is normal for him.  Patient has also reported having a little cough and some shortness of breath.  Patient reports having chronic back pain and chronic neck pain.  No fever, no chills, no nausea, no vomiting, no constipation, no diarrhea, no abdominal pain, no chest pain, no lower extremity swelling, no UTI type symptoms, no confusion.      Personal History     Past Medical History:  Past Medical History:   Diagnosis Date   • Diabetes mellitus (HCC)    • Erectile dysfunction    • Urinary urgency    · Primary hypertension  · History of TIAs    Past Surgical History:  Past Surgical History:   Procedure Laterality Date   • APPENDECTOMY          Family History:   History reviewed. No pertinent family history.     Social History:   Social History     Socioeconomic History   • Marital status:    Tobacco Use   • Smoking status: Former     Packs/day: 1.00     Years: 40.00     Pack years: 40.00     Types: Cigarettes     Start date: 1982     Quit date: 2022     Years since quittin.7   • Smokeless tobacco:  Never   Vaping Use   • Vaping Use: Never used   Substance and Sexual Activity   • Alcohol use: Never   • Drug use: Never   • Sexual activity: Defer        Home Medications:  Prior to Admission medications    Medication Sig Start Date End Date Taking? Authorizing Provider   atorvastatin (LIPITOR) 80 MG tablet Take 1 tablet by mouth Daily. 3/16/23  Yes    Cyanocobalamin (B-12) 500 MCG sublingual tablet Place 1,000 m under the tongue Daily. 3/1/23  Yes    empagliflozin (Jardiance) 25 MG tablet tablet Take 1 tablet by mouth Daily. 3/16/23  Yes    lisinopril (PRINIVIL,ZESTRIL) 40 MG tablet Take 1 tablet by mouth Daily. 3/16/23  Yes    metFORMIN (GLUCOPHAGE) 1000 MG tablet Take 1 tablet by mouth Daily With Breakfast & Dinner. 3/16/23  Yes    tamsulosin (FLOMAX) 0.4 MG capsule 24 hr capsule Take 1 capsule by mouth Daily. 3/16/23  Yes    ticagrelor (Brilinta) 60 MG tablet tablet Take 1 tablet by mouth 2 (Two) Times a Day. 3/16/23  Yes    lisinopril (PRINIVIL,ZESTRIL) 20 MG tablet Take 1 tablet by mouth Daily. 3/1/23 3/17/23 Yes    Dulaglutide (Trulicity) 0.75 MG/0.5ML solution pen-injector Inject 0.75 mg under the skin into the appropriate area as directed Every 7 (Seven) Days.  Patient taking differently: Inject 0.75 mg under the skin into the appropriate area as directed Every 7 (Seven) Days. MONDAY 3/16/23      nicotine (NICODERM CQ) 21 MG/24HR patch Place 1 patch on the skin as directed by provider Daily. 3/1/23      atorvastatin (LIPITOR) 80 MG tablet 80 mg. 6/23/22 3/17/23  Narcisa Wilson MD   Brilinta 90 MG tablet tablet  6/23/22 3/17/23  ProviderNarcisa MD   clopidogrel (PLAVIX) 75 MG tablet  8/9/22 3/17/23  Narcisa Wilson MD   Cyanocobalamin 500 MCG sublingual tablet 1,000 mcg. 6/23/22 3/17/23  Narcisa Wilson MD   Jardiance 25 MG tablet tablet  7/1/22 3/17/23  Narcisa Wilson MD   lisinopril (PRINIVIL,ZESTRIL) 40 MG tablet  7/1/22 3/17/23  Provider, Historical, MD   metFORMIN  (GLUCOPHAGE) 1000 MG tablet  5/25/22 3/17/23  Narcisa Wilson MD   nicotine (NICODERM CQ) 14 MG/24HR patch  6/24/22 3/17/23  Narcisa Wilson MD   nicotine (NICODERM CQ) 21 MG/24HR patch  8/22/22 3/17/23  Narcisa Wilson MD   rosuvastatin (CRESTOR) 40 MG tablet rosuvastatin 40 mg oral tablet take 1 tablet (40 mg) by oral route once daily   Active  3/17/23  Narcisa Wlison MD   Semaglutide,0.25 or 0.5MG/DOS, (Ozempic, 0.25 or 0.5 MG/DOSE,) 2 MG/1.5ML solution pen-injector 0.25 mg. 6/22/22 3/17/23  Narcisa Wilson MD   sildenafil (VIAGRA) 100 MG tablet Take  mg by mouth. 1/6/22 3/17/23  Narcisa Wilson MD   tamsulosin (FLOMAX) 0.4 MG capsule 24 hr capsule Take 1 capsule by mouth Daily for 360 days. 7/18/22 3/17/23  Lashay Leonardo APRN   Trulicity 0.75 MG/0.5ML solution pen-injector  7/25/22 3/17/23  Narcisa Wilson MD        Allergies:  No Known Allergies    Review of Systems  Review of Systems   Constitutional: Positive for activity change. Negative for appetite change, chills, fatigue and fever.   HENT: Positive for rhinorrhea and sore throat. Negative for ear pain, postnasal drip, sinus pressure, sinus pain and sneezing.    Eyes: Positive for itching.   Respiratory: Positive for cough and shortness of breath. Negative for wheezing.    Cardiovascular: Negative for chest pain and leg swelling.   Gastrointestinal: Negative for abdominal pain, constipation, diarrhea, nausea and vomiting.   Endocrine: Negative for polydipsia and polyuria.   Genitourinary: Negative for decreased urine volume, difficulty urinating, dysuria, flank pain, frequency and urgency.   Musculoskeletal: Positive for back pain, gait problem, myalgias and neck pain. Negative for arthralgias.   Neurological: Positive for dizziness, facial asymmetry, weakness, numbness and headaches. Negative for seizures and syncope.   Psychiatric/Behavioral: Negative for agitation and confusion.           Objective     Objective     Vitals:   Temp:  [97.9 °F (36.6 °C)] 97.9 °F (36.6 °C)  Heart Rate:  [70-85] 71  Resp:  [18] 18  BP: (164-214)/() 164/77    Physical Exam   Physical Exam  Constitutional:       Appearance: Normal appearance.   HENT:      Head: Normocephalic and atraumatic.      Right Ear: External ear normal.      Left Ear: External ear normal.      Nose: Nose normal.      Mouth/Throat:      Pharynx: Oropharynx is clear.   Eyes:      Extraocular Movements: Extraocular movements intact.      Pupils: Pupils are equal, round, and reactive to light.   Cardiovascular:      Rate and Rhythm: Normal rate and regular rhythm.      Pulses: Normal pulses.      Heart sounds: Normal heart sounds. No murmur heard.    No friction rub. No gallop.   Pulmonary:      Effort: Pulmonary effort is normal. No respiratory distress.      Breath sounds: Normal breath sounds. No wheezing, rhonchi or rales.   Abdominal:      General: Bowel sounds are normal. There is no distension.      Tenderness: There is no abdominal tenderness.   Musculoskeletal:         General: No swelling. Normal range of motion.      Cervical back: Normal range of motion and neck supple.   Skin:     General: Skin is warm.      Capillary Refill: Capillary refill takes less than 2 seconds.   Neurological:      General: No focal deficit present.      Mental Status: He is alert.      Cranial Nerves: No cranial nerve deficit.          Result Review    Result Review:  Glucose   Date Value Ref Range Status   03/17/2023 159 (H) 65 - 99 mg/dL Final     BUN   Date Value Ref Range Status   03/17/2023 19 8 - 23 mg/dL Final     Creatinine   Date Value Ref Range Status   03/17/2023 1.22 0.76 - 1.27 mg/dL Final     Sodium   Date Value Ref Range Status   03/17/2023 140 136 - 145 mmol/L Final     Potassium   Date Value Ref Range Status   03/17/2023 3.9 3.5 - 5.2 mmol/L Final     Chloride   Date Value Ref Range Status   03/17/2023 102 98 - 107 mmol/L Final     CO2   Date Value Ref  Range Status   03/17/2023 26.3 22.0 - 29.0 mmol/L Final     Calcium   Date Value Ref Range Status   03/17/2023 9.7 8.6 - 10.5 mg/dL Final     Total Protein   Date Value Ref Range Status   03/17/2023 7.5 6.0 - 8.5 g/dL Final     Albumin   Date Value Ref Range Status   03/17/2023 4.6 3.5 - 5.2 g/dL Final     ALT (SGPT)   Date Value Ref Range Status   03/17/2023 28 1 - 41 U/L Final     AST (SGOT)   Date Value Ref Range Status   03/17/2023 25 1 - 40 U/L Final     Alkaline Phosphatase   Date Value Ref Range Status   03/17/2023 140 (H) 39 - 117 U/L Final     Total Bilirubin   Date Value Ref Range Status   03/17/2023 0.5 0.0 - 1.2 mg/dL Final     BUN/Creatinine Ratio   Date Value Ref Range Status   03/17/2023 15.6 7.0 - 25.0 Final     Anion Gap   Date Value Ref Range Status   03/17/2023 11.7 5.0 - 15.0 mmol/L Final      WBC   Date Value Ref Range Status   03/17/2023 6.47 3.40 - 10.80 10*3/mm3 Final     RBC   Date Value Ref Range Status   03/17/2023 5.38 4.14 - 5.80 10*6/mm3 Final     Hemoglobin   Date Value Ref Range Status   03/17/2023 15.5 13.0 - 17.7 g/dL Final     Hematocrit   Date Value Ref Range Status   03/17/2023 45.6 37.5 - 51.0 % Final     MCV   Date Value Ref Range Status   03/17/2023 84.8 79.0 - 97.0 fL Final     MCH   Date Value Ref Range Status   03/17/2023 28.8 26.6 - 33.0 pg Final     MCHC   Date Value Ref Range Status   03/17/2023 34.0 31.5 - 35.7 g/dL Final     RDW   Date Value Ref Range Status   03/17/2023 13.7 12.3 - 15.4 % Final     RDW-SD   Date Value Ref Range Status   03/17/2023 42.5 37.0 - 54.0 fl Final     MPV   Date Value Ref Range Status   03/17/2023 10.2 6.0 - 12.0 fL Final     Platelets   Date Value Ref Range Status   03/17/2023 212 140 - 450 10*3/mm3 Final     Neutrophil %   Date Value Ref Range Status   03/17/2023 63.2 42.7 - 76.0 % Final     Lymphocyte %   Date Value Ref Range Status   03/17/2023 23.5 19.6 - 45.3 % Final     Monocyte %   Date Value Ref Range Status   03/17/2023 10.5 5.0 -  12.0 % Final     Eosinophil %   Date Value Ref Range Status   03/17/2023 1.9 0.3 - 6.2 % Final     Basophil %   Date Value Ref Range Status   03/17/2023 0.6 0.0 - 1.5 % Final     Immature Grans %   Date Value Ref Range Status   03/17/2023 0.3 0.0 - 0.5 % Final     Neutrophils, Absolute   Date Value Ref Range Status   03/17/2023 4.09 1.70 - 7.00 10*3/mm3 Final     Lymphocytes, Absolute   Date Value Ref Range Status   03/17/2023 1.52 0.70 - 3.10 10*3/mm3 Final     Monocytes, Absolute   Date Value Ref Range Status   03/17/2023 0.68 0.10 - 0.90 10*3/mm3 Final     Eosinophils, Absolute   Date Value Ref Range Status   03/17/2023 0.12 0.00 - 0.40 10*3/mm3 Final     Basophils, Absolute   Date Value Ref Range Status   03/17/2023 0.04 0.00 - 0.20 10*3/mm3 Final     Immature Grans, Absolute   Date Value Ref Range Status   03/17/2023 0.02 0.00 - 0.05 10*3/mm3 Final     nRBC   Date Value Ref Range Status   03/17/2023 0.0 0.0 - 0.2 /100 WBC Final      UA    Urinalysis 7/18/22 8/29/22 3/17/23 3/17/23      1537 1537   Squamous Epithelial Cells, UA    0-2   Specific Gravity, UA   >1.030 (A)    Ketones, UA Negative Negative Negative    Blood, UA   Negative    Leukocytes, UA Negative Negative Negative    Nitrite, UA   Negative    RBC, UA    0-2 (A)   WBC, UA    0-2 (A)   Bacteria, UA    None Seen   (A) Abnormal value               Imaging:  CT Angiogram Neck    Result Date: 3/17/2023  PROCEDURE: CT ANGIOGRAM HEAD W AI ANALYSIS OF LVO, 3/17/2023, 14:13 CT ANGIOGRAM NECK, 3/17/2023, 14:13  COMPARISON: [facility], CT, CT CEREBRAL PERFUSION W WO CONTRAST, 3/17/2023, 14:06.  [facility], CT, CT HEAD WO CONTRAST STROKE PROTOCOL, 3/17/2023, 13:49.  INDICATIONS: Neuro deficit, acute, stroke suspected  PROTOCOL:   Standard imaging protocol performed    RADIATION:   DLP: 1104 mGy*cm   Automated exposure control was utilized to minimize radiation dose. CONTRAST: 75 cc Isovue 370 I.V. RAPID: CTA imaging was analyzed  using RAPID AI to enable computer assisted triage notification to rapidly detect a large vessel occlusion (LVO) and shorten notification time  TECHNIQUE: After obtaining the patient's consent, CT images of the head were obtained without and with non-ionic contrast, and multi-planar/3-D imaging were created and interpreted to optimize visualization of vascular anatomy.   FINDINGS:  Vascular findings: CTA head:  The right P1 segment of the posterior cerebral artery is diffusely small with diffusely prominent right posterior communicating artery, this is anatomic variation.  No large vessel occlusions or significant focal stenosis or aneurysms are identified.  There is moderate degree of calcification in the intracranial portions of the ICAs bilaterally mild luminal irregularity in the distal right M1 segment of the middle cerebral artery.  There is mild luminal irregularity in the the left PCA without significant focal stenosis.  The right vertebral artery is dominant with mild to moderate diffuse atherosclerotic disease.  The basilar artery is patent with mild luminal irregularity.  CTA neck: Right-sided findings:  The right common carotid artery are widely patent without disease.  There is a mild degree of calcification in the carotid bulb.  The right external carotid artery and cervical portion of the right ICA are widely patent without significant disease.  The left vertebral artery is non dominant and is widely patent.  Left-sided findings:  The brachiocephalic artery is mildly tortuous mild diffuse disease.  The right common carotid artery is patent.  There is mild to moderate amount of atherosclerotic disease in the carotid bulb.  The right internal carotid artery is mildly tortuous with mild diffuse disease.  No hemodynamically significant stenosis by NASCET criteria is seen.  The right vertebral artery is dominant and wide and widely patent.  Nonvascular findings.  No pathologic enhancement is seen in the  brain.  There is a areas old lacunar infarcts and chronic small vessel ischemic disease as seen on the noncontrast CT.  No cervical adenopathy or acute fluid collections or inflammatory changes are seen in the soft tissues of the neck.  The thyroid gland is diffusely enlarged.  There is degenerative disc disease in the cervical spine.       CTA head:  No significant focal stenosis large vessel occlusions or aneurysms are demonstrated.  CTA neck:  Mild degree of calcification in the right carotid bulb and mild to moderate amount of disease in the left carotid bulb.  No hemodynamically significant stenosis by NASCET criteria.       DANIELLE HERRON DO       Electronically Signed and Approved By: DANIELLE HERRON DO on 3/17/2023 at 15:51             XR Chest 1 View    Result Date: 3/17/2023  PROCEDURE: XR CHEST 1 VW  COMPARISON: None  INDICATIONS: Acute Stroke Protocol (Onset < 12 hrs)  FINDINGS:  Cardiac silhouette is enlarged.  Mediastinal silhouette and pulmonary vascularity is unremarkable.  The lungs are grossly clear.  No pneumothorax or pleural effusions.  Bony structures are intact.       Cardiomegaly.  No active cardiopulmonary disease.       DANIELLE HERRON DO       Electronically Signed and Approved By: DANIELLE HERRON DO on 3/17/2023 at 15:30             CT Head Without Contrast Stroke Protocol    Result Date: 3/17/2023  PROCEDURE: CT HEAD WO CONTRAST STROKE PROTOCOL  COMPARISON:  None  INDICATIONS: Neuro deficit, acute, stroke suspected  PROTOCOL:   Standard imaging protocol performed    RADIATION:   DLP: 1018.2 mGy*cm   MA and/or KV was adjusted to minimize radiation dose.    TECHNIQUE: CT images were obtained without non-ionic intravenous contrast material.  FINDINGS:  The ventricles, sulci, and cerebellar folia are moderately and diffusely prominent consistent with atrophy.  Ill-defined diminished density in cerebral white matter is consistent with moderate gliosis and/or numerous lacunar infarcts.  1 cm area  of encephalomalacia in the region of the head of the caudate nucleus on the left and 7 mm area of encephalomalacia in right periventricular white matter are consistent with old infarcts.  There is no CT evidence of acute intracranial hemorrhage, mass, or mass effect.  The orbits have a normal appearance.  The paranasal sinuses, middle ears, and mastoid air cells are well aerated.  No fractures are seen on bone window images.        CT scan of the head without IV contrast demonstrating diffuse atrophy and white matter changes.  Areas of encephalomalacia are consistent with old infarcts.     MALATHI HEBERT MD       Electronically Signed and Approved By: MALATHI HEBERT MD on 3/17/2023 at 13:57             CT Angiogram Head w AI Analysis of LVO    Result Date: 3/17/2023  PROCEDURE: CT ANGIOGRAM HEAD W AI ANALYSIS OF LVO, 3/17/2023, 14:13 CT ANGIOGRAM NECK, 3/17/2023, 14:13  COMPARISON: [facility], CT, CT CEREBRAL PERFUSION W WO CONTRAST, 3/17/2023, 14:06.  [facility], CT, CT HEAD WO CONTRAST STROKE PROTOCOL, 3/17/2023, 13:49.  INDICATIONS: Neuro deficit, acute, stroke suspected  PROTOCOL:   Standard imaging protocol performed    RADIATION:   DLP: 1104 mGy*cm   Automated exposure control was utilized to minimize radiation dose. CONTRAST: 75 cc Isovue 370 I.V. RAPID: CTA imaging was analyzed using RAPID AI to enable computer assisted triage notification to rapidly detect a large vessel occlusion (LVO) and shorten notification time  TECHNIQUE: After obtaining the patient's consent, CT images of the head were obtained without and with non-ionic contrast, and multi-planar/3-D imaging were created and interpreted to optimize visualization of vascular anatomy.   FINDINGS:  Vascular findings: CTA head:  The right P1 segment of the posterior cerebral artery is diffusely small with diffusely prominent right posterior communicating artery, this is anatomic variation.  No large vessel occlusions or  significant focal stenosis or aneurysms are identified.  There is moderate degree of calcification in the intracranial portions of the ICAs bilaterally mild luminal irregularity in the distal right M1 segment of the middle cerebral artery.  There is mild luminal irregularity in the the left PCA without significant focal stenosis.  The right vertebral artery is dominant with mild to moderate diffuse atherosclerotic disease.  The basilar artery is patent with mild luminal irregularity.  CTA neck: Right-sided findings:  The right common carotid artery are widely patent without disease.  There is a mild degree of calcification in the carotid bulb.  The right external carotid artery and cervical portion of the right ICA are widely patent without significant disease.  The left vertebral artery is non dominant and is widely patent.  Left-sided findings:  The brachiocephalic artery is mildly tortuous mild diffuse disease.  The right common carotid artery is patent.  There is mild to moderate amount of atherosclerotic disease in the carotid bulb.  The right internal carotid artery is mildly tortuous with mild diffuse disease.  No hemodynamically significant stenosis by NASCET criteria is seen.  The right vertebral artery is dominant and wide and widely patent.  Nonvascular findings.  No pathologic enhancement is seen in the brain.  There is a areas old lacunar infarcts and chronic small vessel ischemic disease as seen on the noncontrast CT.  No cervical adenopathy or acute fluid collections or inflammatory changes are seen in the soft tissues of the neck.  The thyroid gland is diffusely enlarged.  There is degenerative disc disease in the cervical spine.       CTA head:  No significant focal stenosis large vessel occlusions or aneurysms are demonstrated.  CTA neck:  Mild degree of calcification in the right carotid bulb and mild to moderate amount of disease in the left carotid bulb.  No hemodynamically significant stenosis  by NASCET criteria.       DANIELLE HERRON DO       Electronically Signed and Approved By: DANIELLE HERRON DO on 3/17/2023 at 15:51             CT CEREBRAL PERFUSION WITH & WITHOUT CONTRAST    Result Date: 3/17/2023  PROCEDURE: CT CEREBRAL PERFUSION W WO CONTRAST  COMPARISON: None  INDICATIONS: Neuro deficit, acute, stroke suspected  PROTOCOL:   Standard imaging protocol performed    RADIATION:   DLP: 1299.6 mGy*cm   Automated exposure control was utilized to minimize radiation dose. CONTRAST: 80 cc Isovue 370 I.V.   FINDINGS:  Nonspecific perfusion abnormality is seen, however, no core infarct or tissue at risk is evident.        CT cerebral perfusion study demonstrating nonspecific perfusion abnormality, as above.      MALATHI HEBERT MD       Electronically Signed and Approved By: MALATHI HEBERT MD on 3/17/2023 at 15:29                  Assessment & Plan   Assessment / Plan     Active Hospital Problems    Diagnosis  POA   • **Cerebrovascular accident (CVA), unspecified mechanism (HCC) [I63.9]  Yes     Priority: High   • Benign essential HTN [I10]  Yes     Priority: Medium   • Type 2 diabetes mellitus (HCC) [E11.9]  Yes     Priority: Low   • Hyperlipidemia [E78.5]  Yes     Priority: Low        Assessment/Plan:   1. Rule out CVA-patient presents with left-sided weakness x1 day.  Patient has had similar episodes in the past.  Patient has been diagnosed with TIAs and switch from Plavix to Brilinta as per patient.  On patient's medication list, patient only has Plavix listed.  Patient does have an MRI done in 12/2020 showing acute left superior lateral pontine infarction.  Patient will be worked up for acute CVA.  We will get an MRI of the brain, 2D echo and carotid Doppler.  Patient will be placed on Plavix, aspirin and statin.  We will get a PT/OT/speech and neurology consult.  2. Primary hypertension-patient with history of hypertension.  We will allow for permissible hypertension secondary to patient having acute CVA  type symptoms.  3. Type 2 diabetes-patient with history of type 2 diabetes.  Patient will be placed on insulin sliding scale.  Accu-Cheks as scheduled.  4. Hyperlipidemia-patient with history of hyperlipidemia.  Patient will be continued on his home statin.      DVT prophylaxis:  Mechanical DVT prophylaxis orders are present.    CODE STATUS:    Medical Intervention Limits: NO intubation (DNI)  Level Of Support Discussed With: Patient  Code Status (Patient has no pulse and is not breathing): No CPR (Do Not Attempt to Resuscitate)  Medical Interventions (Patient has pulse or is breathing): Limited Support  Release to patient: Routine Release      Admission Status:  I believe this patient meets observation status.    Electronically signed by Teresa Patel MD, 03/17/23, 5:46 PM EDT.             Electronically signed by Teresa Patel MD at 03/17/23 3273          Emergency Department Notes      Enoc Adler DO at 03/17/23 1343          Time: 1:44 PM EDT  Date of encounter:  3/17/2023  Independent Historian/Clinical History and Information was obtained by:   Patient  Chief Complaint: left-sided weakness    History is limited by: N/A    History of Present Illness:  Patient is a 62 y.o. year old male who presents to the emergency department for evaluation of left-sided weakness. Patient reports he started having left-sided weakness involving his LUE and LLE about an hour ago. He notes some slight L sided facial asymmetry. He also reports numbness in his LUE. He denies numbness or tingling in the LLE. He reports he could barely walk due to his symptoms and almost fell.    Patient states he has had similar symptoms previously that come and go in severity. He states he had an episode earlier this week that woke him up. He reports hx of TIA's and states he is on Brilinta.    Patient reports he is on an antihypertensive medication and has been taking it as directed. He states he only check his blood pressure at home  "when he \"starts getting symptoms,\" but he states he is not aware of it being high lately.    HPI    Patient Care Team  Primary Care Provider: Carlos Townsend APRN    Past Medical History:     No Known Allergies  Past Medical History:   Diagnosis Date   • Diabetes mellitus (HCC)    • Erectile dysfunction    • Urinary urgency      Past Surgical History:   Procedure Laterality Date   • APPENDECTOMY       History reviewed. No pertinent family history.    Home Medications:  Prior to Admission medications    Medication Sig Start Date End Date Taking? Authorizing Provider   atorvastatin (LIPITOR) 80 MG tablet 80 mg. 6/23/22   Narcisa Wilson MD   atorvastatin (LIPITOR) 80 MG tablet Take 1 tablet by mouth Daily. 3/16/23      Brilinta 90 MG tablet tablet  6/23/22   Narcisa Wilson MD   clopidogrel (PLAVIX) 75 MG tablet  8/9/22   Narcisa Wilson MD   Cyanocobalamin (B-12) 500 MCG sublingual tablet Place 1,000 m under the tongue Daily. 3/1/23      Cyanocobalamin 500 MCG sublingual tablet 1,000 mcg. 6/23/22   Narcisa Wilson MD   Dulaglutide (Trulicity) 0.75 MG/0.5ML solution pen-injector Inject 0.75 mg under the skin into the appropriate area as directed Every 7 (Seven) Days. 3/16/23      empagliflozin (Jardiance) 25 MG tablet tablet Take 1 tablet by mouth Daily. 3/16/23      Jardiance 25 MG tablet tablet  7/1/22   Narcisa Wilson MD   lisinopril (PRINIVIL,ZESTRIL) 20 MG tablet Take 1 tablet by mouth Daily. 3/1/23      lisinopril (PRINIVIL,ZESTRIL) 40 MG tablet  7/1/22   Narcisa Wilson MD   lisinopril (PRINIVIL,ZESTRIL) 40 MG tablet Take 1 tablet by mouth Daily. 3/16/23      metFORMIN (GLUCOPHAGE) 1000 MG tablet  5/25/22   Narcisa Wilson MD   metFORMIN (GLUCOPHAGE) 1000 MG tablet Take 1 tablet by mouth Daily With Breakfast & Dinner. 3/16/23      nicotine (NICODERM CQ) 14 MG/24HR patch  6/24/22   Narcisa Wilson MD   nicotine (NICODERM CQ) 21 MG/24HR patch  8/22/22   Provider, " MD Narcisa   nicotine (NICODERM CQ) 21 MG/24HR patch Place 1 patch on the skin as directed by provider Daily. 3/1/23      rosuvastatin (CRESTOR) 40 MG tablet rosuvastatin 40 mg oral tablet take 1 tablet (40 mg) by oral route once daily   Active    ProviderNarcisa MD   Semaglutide,0.25 or 0.5MG/DOS, (Ozempic, 0.25 or 0.5 MG/DOSE,) 2 MG/1.5ML solution pen-injector 0.25 mg. 22   Narcisa Wilson MD   sildenafil (VIAGRA) 100 MG tablet Take  mg by mouth. 22  Narcisa Wilson MD   tamsulosin (FLOMAX) 0.4 MG capsule 24 hr capsule Take 1 capsule by mouth Daily for 360 days. 22  Lashay Leonardo APRN   tamsulosin (FLOMAX) 0.4 MG capsule 24 hr capsule Take 1 capsule by mouth Daily. 3/16/23      ticagrelor (Brilinta) 60 MG tablet tablet Take 1 tablet by mouth 2 (Two) Times a Day. 3/16/23      Trulicity 0.75 MG/0.5ML solution pen-injector  22   ProviderNarcisa MD   lisinopril (PRINIVIL,ZESTRIL) 40 MG tablet Take 1 tablet by mouth Daily. 3/1/23 3/16/23     tamsulosin (FLOMAX) 0.4 MG capsule 24 hr capsule Take 1 capsule by mouth Daily. 2/12/23 3/16/23  Lashay Leonardo APRN        Social History:   Social History     Tobacco Use   • Smoking status: Former     Packs/day: 1.00     Years: 40.00     Pack years: 40.00     Types: Cigarettes     Start date: 1982     Quit date: 2022     Years since quittin.7   • Smokeless tobacco: Never   Vaping Use   • Vaping Use: Never used   Substance Use Topics   • Alcohol use: Never   • Drug use: Never         Review of Systems:  Review of Systems   Constitutional: Negative for chills and fever.   HENT: Negative for sore throat.    Eyes: Negative for photophobia.   Respiratory: Negative for shortness of breath.    Cardiovascular: Negative for chest pain.   Gastrointestinal: Negative for abdominal pain, diarrhea, nausea and vomiting.   Genitourinary: Negative for dysuria.   Musculoskeletal: Negative for neck pain.  "  Skin: Negative for wound.   Neurological: Positive for facial asymmetry (slight, left-sided), weakness (left-sided involving LUE and LLE) and numbness (LUE). Negative for headaches.        Negative for numbness or tingling in LLE   All other systems reviewed and are negative.       Physical Exam:  BP (!) 190/93   Pulse 70   Temp 97.9 °F (36.6 °C) (Oral)   Resp 18   Ht 190.5 cm (75\")   Wt 119 kg (262 lb 2 oz)   SpO2 96%   BMI 32.76 kg/m²     Physical Exam  Vitals and nursing note reviewed.   Constitutional:       General: He is not in acute distress.  HENT:      Head: Normocephalic and atraumatic.   Eyes:      Extraocular Movements: Extraocular movements intact.   Cardiovascular:      Rate and Rhythm: Normal rate and regular rhythm.   Pulmonary:      Effort: Pulmonary effort is normal. No respiratory distress.      Breath sounds: Normal breath sounds.   Abdominal:      General: Abdomen is flat.      Palpations: Abdomen is soft.      Tenderness: There is no abdominal tenderness.   Musculoskeletal:         General: Normal range of motion.      Cervical back: Normal range of motion and neck supple.   Skin:     General: Skin is warm and dry.      Capillary Refill: Capillary refill takes less than 2 seconds.   Neurological:      Mental Status: He is alert and oriented to person, place, and time. Mental status is at baseline.      Cranial Nerves: No dysarthria.      Sensory: Sensory deficit (LUE) present.      Comments: Weakness in LLE, Slight weakness in LUE                 Procedures:  Procedures      Medical Decision Making:      Comorbidities that affect care:    Diabetes, Hypertension    External Notes reviewed:    None      The following orders were placed and all results were independently analyzed by me:  Orders Placed This Encounter   Procedures   • CT Head Without Contrast Stroke Protocol   • CT Angiogram Head w AI Analysis of LVO   • CT Angiogram Neck   • CT CEREBRAL PERFUSION WITH & WITHOUT CONTRAST "   • XR Chest 1 View   • [place] Draw   • Comprehensive Metabolic Panel   • Protime-INR   • aPTT   • Single High Sensitivity Troponin T   • Urinalysis With Microscopic If Indicated (No Culture) - Urine, Clean Catch   • CBC Auto Differential   • Urinalysis, Microscopic Only - Urine, Clean Catch   • NPO Diet NPO Type: Strict NPO   • Initiate Department's Acute Stroke Process (Team D, Code 19, etc)   • Perform NIH Stroke Scale   • Measure Actual Weight   • Head of Bed 30 Degrees or Less   • Undress and Gown   • Cardiac Monitoring   • Continuous Pulse Oximetry   • Vital Signs   • Neuro Checks   • Notify MD for SBP < 80 or > 200   • Notify Provider for SBP greater than 140 if hemorrhagic Stroke   • No Hypotonic Fluids   • Nursing Swallow Assessment   • Inpatient Hospitalist Consult   • Oxygen Therapy- Nasal Cannula; 2 LPM; Titrate for SPO2: equal to or greater than, 94%   • SLP Consult: Eval & Treat RN Dysphagia Screen Failed   • POC Glucose Once   • POC Glucose Once   • ECG 12 Lead ED Triage Standing Order; Acute Stroke (Onset <12 hrs)   • Type & Screen   • ABO RH Specimen Verification   • Insert Large Bore Peripheral IV - Right AC Preferred   • CBC & Differential   • Green Top (Gel)   • Lavender Top   • Gold Top - SST   • Light Blue Top       Medications Given in the Emergency Department:  Medications   sodium chloride 0.9 % flush 10 mL (has no administration in time range)   iopamidol (ISOVUE-370) 76 % injection 150 mL (150 mL Intravenous Given 3/17/23 1406)   aspirin chewable tablet 324 mg (324 mg Oral Given 3/17/23 1526)   labetalol (NORMODYNE,TRANDATE) injection 10 mg (10 mg Intravenous Given 3/17/23 1541)        ED Course:         Labs:    Lab Results (last 24 hours)     Procedure Component Value Units Date/Time    POC Glucose Once [[id number]]  (Abnormal) Collected: 03/17/23 1341    Specimen: Blood Updated: 03/17/23 1349     Glucose 159 mg/dL      Comment: Serial Number: [id number]:  [id number]       CBC &  Differential [[id number]]  (Normal) Collected: 03/17/23 1359    Specimen: Blood Updated: 03/17/23 1438    Narrative:      The following orders were created for panel order CBC & Differential.  Procedure                               Abnormality         Status                     ---------                               -----------         ------                     CBC Auto Differential[[id number]]        Normal              Final result                 Please view results for these tests on the individual orders.    Comprehensive Metabolic Panel [[id number]]  (Abnormal) Collected: 03/17/23 1359    Specimen: Blood Updated: 03/17/23 1456     Glucose 159 mg/dL      BUN 19 mg/dL      Creatinine 1.22 mg/dL      Sodium 140 mmol/L      Potassium 3.9 mmol/L      Chloride 102 mmol/L      CO2 26.3 mmol/L      Calcium 9.7 mg/dL      Total Protein 7.5 g/dL      Albumin 4.6 g/dL      ALT (SGPT) 28 U/L      AST (SGOT) 25 U/L      Alkaline Phosphatase 140 U/L      Total Bilirubin 0.5 mg/dL      Globulin 2.9 gm/dL      A/G Ratio 1.6 g/dL      BUN/Creatinine Ratio 15.6     Anion Gap 11.7 mmol/L      eGFR 67.0 mL/min/1.73     Narrative:      GFR Normal >60  Chronic Kidney Disease <60  Kidney Failure <15      Protime-INR [[id number]]  (Normal) Collected: 03/17/23 1359    Specimen: Blood Updated: 03/17/23 1543     Protime 12.9 Seconds      INR 0.96    Narrative:      Suggested Therapeutic Ranges For Oral Anticoagulant Therapy:  Level of Therapy                      INR Target Range  Standard Dose                            2.0-3.0  High Dose                                2.5-3.5  Patients not receiving anticoagulant  Therapy Normal Range                     0.86-1.15    aPTT [[id number]]  (Normal) Collected: 03/17/23 1359    Specimen: Blood Updated: 03/17/23 1543     PTT 30.5 seconds     Single High Sensitivity Troponin T [[id number]]  (Abnormal) Collected: 03/17/23 1359    Specimen: Blood Updated: 03/17/23 1456     HS Troponin T 23 ng/L      Narrative:      High Sensitive Troponin T Reference Range:  <10.0 ng/L- Negative Female for AMI  <15.0 ng/L- Negative Male for AMI  >=10 - Abnormal Female indicating possible myocardial injury.  >=15 - Abnormal Male indicating possible myocardial injury.   Clinicians would have to utilize clinical acumen, EKG, Troponin, and serial changes to determine if it is an Acute Myocardial Infarction or myocardial injury due to an underlying chronic condition.         CBC Auto Differential [[id number]]  (Normal) Collected: 03/17/23 1359    Specimen: Blood Updated: 03/17/23 1438     WBC 6.47 10*3/mm3      RBC 5.38 10*6/mm3      Hemoglobin 15.5 g/dL      Hematocrit 45.6 %      MCV 84.8 fL      MCH 28.8 pg      MCHC 34.0 g/dL      RDW 13.7 %      RDW-SD 42.5 fl      MPV 10.2 fL      Platelets 212 10*3/mm3      Neutrophil % 63.2 %      Lymphocyte % 23.5 %      Monocyte % 10.5 %      Eosinophil % 1.9 %      Basophil % 0.6 %      Immature Grans % 0.3 %      Neutrophils, Absolute 4.09 10*3/mm3      Lymphocytes, Absolute 1.52 10*3/mm3      Monocytes, Absolute 0.68 10*3/mm3      Eosinophils, Absolute 0.12 10*3/mm3      Basophils, Absolute 0.04 10*3/mm3      Immature Grans, Absolute 0.02 10*3/mm3      nRBC 0.0 /100 WBC     Urinalysis With Microscopic If Indicated (No Culture) - Urine, Clean Catch [[id number]]  (Abnormal) Collected: 03/17/23 1537    Specimen: Urine, Clean Catch Updated: 03/17/23 1554     Color, UA Yellow     Appearance, UA Clear     pH, UA 5.5     Specific Gravity, UA >1.030     Glucose, UA >=1000 mg/dL (3+)     Ketones, UA Negative     Bilirubin, UA Negative     Blood, UA Negative     Protein,  mg/dL (2+)     Leuk Esterase, UA Negative     Nitrite, UA Negative     Urobilinogen, UA 0.2 E.U./dL    Urinalysis, Microscopic Only - Urine, Clean Catch [[id number]]  (Abnormal) Collected: 03/17/23 1537    Specimen: Urine, Clean Catch Updated: 03/17/23 1554     RBC, UA 0-2 /HPF      WBC, UA 0-2 /HPF      Bacteria, UA None  Seen /HPF      Squamous Epithelial Cells, UA 0-2 /HPF      Hyaline Casts, UA 0-2 /LPF      Methodology Automated Microscopy           Imaging:    CT Angiogram Neck    Result Date: 3/17/2023  PROCEDURE: CT ANGIOGRAM HEAD W AI ANALYSIS OF LVO, 3/17/2023, 14:13 CT ANGIOGRAM NECK, 3/17/2023, 14:13  COMPARISON: [facility], CT, CT CEREBRAL PERFUSION W WO CONTRAST, 3/17/2023, 14:06.  [facility], CT, CT HEAD WO CONTRAST STROKE PROTOCOL, 3/17/2023, 13:49.  INDICATIONS: Neuro deficit, acute, stroke suspected  PROTOCOL:   Standard imaging protocol performed    RADIATION:   DLP: 1104 mGy*cm   Automated exposure control was utilized to minimize radiation dose. CONTRAST: 75 cc Isovue 370 I.V. RAPID: CTA imaging was analyzed using RAPID AI to enable computer assisted triage notification to rapidly detect a large vessel occlusion (LVO) and shorten notification time  TECHNIQUE: After obtaining the patient's consent, CT images of the head were obtained without and with non-ionic contrast, and multi-planar/3-D imaging were created and interpreted to optimize visualization of vascular anatomy.   FINDINGS:  Vascular findings: CTA head:  The right P1 segment of the posterior cerebral artery is diffusely small with diffusely prominent right posterior communicating artery, this is anatomic variation.  No large vessel occlusions or significant focal stenosis or aneurysms are identified.  There is moderate degree of calcification in the intracranial portions of the ICAs bilaterally mild luminal irregularity in the distal right M1 segment of the middle cerebral artery.  There is mild luminal irregularity in the the left PCA without significant focal stenosis.  The right vertebral artery is dominant with mild to moderate diffuse atherosclerotic disease.  The basilar artery is patent with mild luminal irregularity.  CTA neck: Right-sided findings:  The right common carotid artery are widely patent without disease.   There is a mild degree of calcification in the carotid bulb.  The right external carotid artery and cervical portion of the right ICA are widely patent without significant disease.  The left vertebral artery is non dominant and is widely patent.  Left-sided findings:  The brachiocephalic artery is mildly tortuous mild diffuse disease.  The right common carotid artery is patent.  There is mild to moderate amount of atherosclerotic disease in the carotid bulb.  The right internal carotid artery is mildly tortuous with mild diffuse disease.  No hemodynamically significant stenosis by NASCET criteria is seen.  The right vertebral artery is dominant and wide and widely patent.  Nonvascular findings.  No pathologic enhancement is seen in the brain.  There is a areas old lacunar infarcts and chronic small vessel ischemic disease as seen on the noncontrast CT.  No cervical adenopathy or acute fluid collections or inflammatory changes are seen in the soft tissues of the neck.  The thyroid gland is diffusely enlarged.  There is degenerative disc disease in the cervical spine.       CTA head:  No significant focal stenosis large vessel occlusions or aneurysms are demonstrated.  CTA neck:  Mild degree of calcification in the right carotid bulb and mild to moderate amount of disease in the left carotid bulb.  No hemodynamically significant stenosis by NASCET criteria.       DANIELLE HERRON DO       Electronically Signed and Approved By: DANIELLE HERRON DO on 3/17/2023 at 15:51             XR Chest 1 View    Result Date: 3/17/2023  PROCEDURE: XR CHEST 1 VW  COMPARISON: None  INDICATIONS: Acute Stroke Protocol (Onset < 12 hrs)  FINDINGS:  Cardiac silhouette is enlarged.  Mediastinal silhouette and pulmonary vascularity is unremarkable.  The lungs are grossly clear.  No pneumothorax or pleural effusions.  Bony structures are intact.       Cardiomegaly.  No active cardiopulmonary disease.       DANIELLE HERRON DO       Electronically  Signed and Approved By: DANIELLE HERRON DO on 3/17/2023 at 15:30             CT Head Without Contrast Stroke Protocol    Result Date: 3/17/2023  PROCEDURE: CT HEAD WO CONTRAST STROKE PROTOCOL  COMPARISON:  None  INDICATIONS: Neuro deficit, acute, stroke suspected  PROTOCOL:   Standard imaging protocol performed    RADIATION:   DLP: 1018.2 mGy*cm   MA and/or KV was adjusted to minimize radiation dose.    TECHNIQUE: CT images were obtained without non-ionic intravenous contrast material.  FINDINGS:  The ventricles, sulci, and cerebellar folia are moderately and diffusely prominent consistent with atrophy.  Ill-defined diminished density in cerebral white matter is consistent with moderate gliosis and/or numerous lacunar infarcts.  1 cm area of encephalomalacia in the region of the head of the caudate nucleus on the left and 7 mm area of encephalomalacia in right periventricular white matter are consistent with old infarcts.  There is no CT evidence of acute intracranial hemorrhage, mass, or mass effect.  The orbits have a normal appearance.  The paranasal sinuses, middle ears, and mastoid air cells are well aerated.  No fractures are seen on bone window images.        CT scan of the head without IV contrast demonstrating diffuse atrophy and white matter changes.  Areas of encephalomalacia are consistent with old infarcts.     MALATHI HEBERT MD       Electronically Signed and Approved By: MALATHI HEBERT MD on 3/17/2023 at 13:57             CT Angiogram Head w AI Analysis of LVO    Result Date: 3/17/2023  PROCEDURE: CT ANGIOGRAM HEAD W AI ANALYSIS OF LVO, 3/17/2023, 14:13 CT ANGIOGRAM NECK, 3/17/2023, 14:13  COMPARISON: [facility], CT, CT CEREBRAL PERFUSION W WO CONTRAST, 3/17/2023, 14:06.  [facility], CT, CT HEAD WO CONTRAST STROKE PROTOCOL, 3/17/2023, 13:49.  INDICATIONS: Neuro deficit, acute, stroke suspected  PROTOCOL:   Standard imaging protocol performed    RADIATION:   DLP: 1104 mGy*cm    Automated exposure control was utilized to minimize radiation dose. CONTRAST: 75 cc Isovue 370 I.V. RAPID: CTA imaging was analyzed using RAPID AI to enable computer assisted triage notification to rapidly detect a large vessel occlusion (LVO) and shorten notification time  TECHNIQUE: After obtaining the patient's consent, CT images of the head were obtained without and with non-ionic contrast, and multi-planar/3-D imaging were created and interpreted to optimize visualization of vascular anatomy.   FINDINGS:  Vascular findings: CTA head:  The right P1 segment of the posterior cerebral artery is diffusely small with diffusely prominent right posterior communicating artery, this is anatomic variation.  No large vessel occlusions or significant focal stenosis or aneurysms are identified.  There is moderate degree of calcification in the intracranial portions of the ICAs bilaterally mild luminal irregularity in the distal right M1 segment of the middle cerebral artery.  There is mild luminal irregularity in the the left PCA without significant focal stenosis.  The right vertebral artery is dominant with mild to moderate diffuse atherosclerotic disease.  The basilar artery is patent with mild luminal irregularity.  CTA neck: Right-sided findings:  The right common carotid artery are widely patent without disease.  There is a mild degree of calcification in the carotid bulb.  The right external carotid artery and cervical portion of the right ICA are widely patent without significant disease.  The left vertebral artery is non dominant and is widely patent.  Left-sided findings:  The brachiocephalic artery is mildly tortuous mild diffuse disease.  The right common carotid artery is patent.  There is mild to moderate amount of atherosclerotic disease in the carotid bulb.  The right internal carotid artery is mildly tortuous with mild diffuse disease.  No hemodynamically significant stenosis by NASCET criteria is seen.   The right vertebral artery is dominant and wide and widely patent.  Nonvascular findings.  No pathologic enhancement is seen in the brain.  There is a areas old lacunar infarcts and chronic small vessel ischemic disease as seen on the noncontrast CT.  No cervical adenopathy or acute fluid collections or inflammatory changes are seen in the soft tissues of the neck.  The thyroid gland is diffusely enlarged.  There is degenerative disc disease in the cervical spine.       CTA head:  No significant focal stenosis large vessel occlusions or aneurysms are demonstrated.  CTA neck:  Mild degree of calcification in the right carotid bulb and mild to moderate amount of disease in the left carotid bulb.  No hemodynamically significant stenosis by NASCET criteria.       DANIELLE HERRON DO       Electronically Signed and Approved By: DANIELLE HERRON DO on 3/17/2023 at 15:51             CT CEREBRAL PERFUSION WITH & WITHOUT CONTRAST    Result Date: 3/17/2023  PROCEDURE: CT CEREBRAL PERFUSION W WO CONTRAST  COMPARISON: None  INDICATIONS: Neuro deficit, acute, stroke suspected  PROTOCOL:   Standard imaging protocol performed    RADIATION:   DLP: 1299.6 mGy*cm   Automated exposure control was utilized to minimize radiation dose. CONTRAST: 80 cc Isovue 370 I.V.   FINDINGS:  Nonspecific perfusion abnormality is seen, however, no core infarct or tissue at risk is evident.        CT cerebral perfusion study demonstrating nonspecific perfusion abnormality, as above.      MALATHI HEBERT MD       Electronically Signed and Approved By: MALATHI HEBERT MD on 3/17/2023 at 15:29                 Differential Diagnosis and Discussion:    Weakness: Based on the patient's history, signs, and symptoms, the diffential diagnosis includes but is not limited to meningitis, stroke, sepsis, subarachnoid hemorrhage, intracranial bleeding, encephalitis, acute uti, dehydration, MS, myasthenia gravis, Guillan Oto, migraine variant, neuromuscular disorders  vertigo, electrolyte imbalance, and metabolic disorders.    All labs were reviewed and interpreted by me.  All X-rays were independently reviewed by me.  EKG was interpreted by me.  CT scan radiology interpretation was reviewed by me.    MDM   62-year-old male patient presents with strokelike symptoms affecting his left side.  Patient was evaluated by teleneurology who did not recommend TNK due to the patient having strokelike symptoms intermittently during this past week.  The recommendation is also to allow permissive hypertension and to admit the patient for MRI and further evaluation of his hypertension.  Patient on reevaluation is having improvements in his left-sided weakness and numbness.      Patient Care Considerations:    MRI: I considered ordering an MRI however  Patient being admitted and MRI will be done outpatient.      Consultants/Shared Management Plan:  Patient evaluated by telemetry neurology.  Their recommendation is that the patient is not a TNK candidate due to his having TIA symptoms several times during this past week.    Patient was discussed with , hospitalist, who will admit the patient        Social Determinants of Health:    Patient is independent, reliable, and has access to care.       Disposition and Care Coordination:    Admit:   Through independent evaluation of the patient's history, physical, and imperical data, the patient meets criteria for observation/admission to the hospital.        Final diagnoses:   Cerebrovascular accident (CVA), unspecified mechanism (HCC)        ED Disposition     ED Disposition   Decision to Admit    Condition   --    Comment   --             This medical record created using voice recognition software.    Documentation assistance provided by Alex Huber acting as scribe for Enoc Adler DO. Information recorded by the scribe was done at my direction and has been verified and validated by me.           Alex Huber  03/17/23 [id number]        Enoc Adler DO  23 1721      Electronically signed by Enoc Adler DO at 23 1721          Physician Progress Notes (last 24 hours)      Eloy Moon MD at 23 1036           [facility]  Date: 3/18/2023  Patient Name: Lester Keating  : 1960  MRN: [id number]  Date of admission: 3/17/2023      Subjective   Subjective     Chief complaint: Left-sided weakness, left eye vision changes    Summary:  62-year-old male with history of diabetes, hypertension, history of TIA, history of multiple prior lacunar strokes, left pontine, left anterior corpus callosum, has had tPA in the past, left frontal stroke, chronic hemorrhage on prior MRI brain, diabetes mellitus, prior left-sided weakness primarily his left lower extremity which was reportedly resolved, difficulty ambulating, left numbness and weakness to his face, has had diagnosis of TIAs in the past, has been on aspirin, changed to Plavix prior to hospitalization, CT head with encephalomalacia consistent with old infarctions, CT angio of the head shows no focal stenosis or large vessel occlusions, CTA of the neck shows mild calcification in the right carotid bulb and mild to moderate disease in the left carotid bulb, no hemodynamically significant stenosis, MRI pending, was not a candidate for tPA    Interval follow-up: Patient seen and examined this morning, no acute distress, no acute major night events, patient standing against the heater in the room, still has weakness of his right lower extremity, denies fevers, chills, sweats.  No headache or dizziness or lightheadedness.  No new focal neurological weaknesses.  Telemetry reviewed, no acute major arrhythmic events, awaiting MRI brain to further characterize symptoms, he is already on aspirin, he was resumed on Brilinta, he is on high intensity statin    Review of systems:  All systems reviewed and negative except for left lower extremity  weakness    Objective   Objective     Vitals:   Temp:  [97.2 °F (36.2 °C)-98.3 °F (36.8 °C)] 97.2 °F (36.2 °C)  Heart Rate:  [62-85] 62  Resp:  [18-20] 18  BP: (150-214)/() 178/90  Physical Exam    Constitutional: Awake, alert, no acute distress standing   Eyes: Pupils equal, sclerae anicteric, no conjunctival injection   HENT: NCAT, mucous membranes moist   Neck: Supple, no thyromegaly, no lymphadenopathy, trachea midline   Respiratory: Clear to auscultation bilaterally, nonlabored respirations    Cardiovascular: RRR, no murmurs, rubs, or gallops, palpable pedal pulses bilaterally   Gastrointestinal: Positive bowel sounds, soft, nontender, nondistended   Musculoskeletal: No bilateral ankle edema, no clubbing or cyanosis to extremities   Psychiatric: Appropriate affect, cooperative   Neurologic: Oriented x 3, strength symmetric in all extremities except his left leg which is 4 out of 5 strength, Cranial Nerves grossly intact to confrontation, speech clear   Skin: No rashes visible on exposed skin  Result Review    Result Review:  I have personally reviewed the pertinent results from the past 24 hours to 3/18/2023 10:37 EDT and agree with these findings:  [x]  Laboratory   CBC    CBC 3/17/23 3/18/23   WBC 6.47 5.32   RBC 5.38 4.88   Hemoglobin 15.5 14.3   Hematocrit 45.6 41.1   MCV 84.8 84.2   MCH 28.8 29.3   MCHC 34.0 34.8   RDW 13.7 13.5   Platelets 212 178           BMP    BMP 3/17/23 3/18/23   BUN 19 19   Creatinine 1.22 0.93   Sodium 140 138   Potassium 3.9 3.5   Chloride 102 105   CO2 26.3 23.7   Calcium 9.7 9.2           LIVER FUNCTION TESTS:      Lab 03/17/23  1359   TOTAL PROTEIN 7.5   ALBUMIN 4.6   GLOBULIN 2.9   ALT (SGPT) 28   AST (SGOT) 25   BILIRUBIN 0.5   ALK PHOS 140*       [x]  Microbiology No results found for: ACANTHNAEG, AFBCX, BPERTUSSISCX, BLOODCX  No results found for: BCIDPCR, CXREFLEX, CSFCX, CULTURETIS  No results found for: CULTURES, HSVCX, URCX  No results found for: EYECULTURE,  GCCX, HSVCULTURE, LABHSV  No results found for: LEGIONELLA, MRSACX, MUMPSCX, MYCOPLASCX  No results found for: NOCARDIACX, STOOLCX  No results found for: THROATCX, UNSTIMCULT, URINECX, CULTURE, VZVCULTUR  No results found for: VIRALCULTU, WOUNDCX    [x]  Radiology CT Angiogram Neck    Result Date: 3/17/2023  PROCEDURE: CT ANGIOGRAM HEAD W AI ANALYSIS OF LVO, 3/17/2023, 14:13 CT ANGIOGRAM NECK, 3/17/2023, 14:13  COMPARISON: [facility], CT, CT CEREBRAL PERFUSION W WO CONTRAST, 3/17/2023, 14:06.  [facility], CT, CT HEAD WO CONTRAST STROKE PROTOCOL, 3/17/2023, 13:49.  INDICATIONS: Neuro deficit, acute, stroke suspected  PROTOCOL:   Standard imaging protocol performed    RADIATION:   DLP: 1104 mGy*cm   Automated exposure control was utilized to minimize radiation dose. CONTRAST: 75 cc Isovue 370 I.V. RAPID: CTA imaging was analyzed using RAPID AI to enable computer assisted triage notification to rapidly detect a large vessel occlusion (LVO) and shorten notification time  TECHNIQUE: After obtaining the patient's consent, CT images of the head were obtained without and with non-ionic contrast, and multi-planar/3-D imaging were created and interpreted to optimize visualization of vascular anatomy.   FINDINGS:  Vascular findings: CTA head:  The right P1 segment of the posterior cerebral artery is diffusely small with diffusely prominent right posterior communicating artery, this is anatomic variation.  No large vessel occlusions or significant focal stenosis or aneurysms are identified.  There is moderate degree of calcification in the intracranial portions of the ICAs bilaterally mild luminal irregularity in the distal right M1 segment of the middle cerebral artery.  There is mild luminal irregularity in the the left PCA without significant focal stenosis.  The right vertebral artery is dominant with mild to moderate diffuse atherosclerotic disease.  The basilar artery is patent with mild  luminal irregularity.  CTA neck: Right-sided findings:  The right common carotid artery are widely patent without disease.  There is a mild degree of calcification in the carotid bulb.  The right external carotid artery and cervical portion of the right ICA are widely patent without significant disease.  The left vertebral artery is non dominant and is widely patent.  Left-sided findings:  The brachiocephalic artery is mildly tortuous mild diffuse disease.  The right common carotid artery is patent.  There is mild to moderate amount of atherosclerotic disease in the carotid bulb.  The right internal carotid artery is mildly tortuous with mild diffuse disease.  No hemodynamically significant stenosis by NASCET criteria is seen.  The right vertebral artery is dominant and wide and widely patent.  Nonvascular findings.  No pathologic enhancement is seen in the brain.  There is a areas old lacunar infarcts and chronic small vessel ischemic disease as seen on the noncontrast CT.  No cervical adenopathy or acute fluid collections or inflammatory changes are seen in the soft tissues of the neck.  The thyroid gland is diffusely enlarged.  There is degenerative disc disease in the cervical spine.       CTA head:  No significant focal stenosis large vessel occlusions or aneurysms are demonstrated.  CTA neck:  Mild degree of calcification in the right carotid bulb and mild to moderate amount of disease in the left carotid bulb.  No hemodynamically significant stenosis by NASCET criteria.       DANIELLE HERRON DO       Electronically Signed and Approved By: DANIELLE HERRON DO on 3/17/2023 at 15:51             XR Chest 1 View    Result Date: 3/17/2023  PROCEDURE: XR CHEST 1 VW  COMPARISON: None  INDICATIONS: Acute Stroke Protocol (Onset < 12 hrs)  FINDINGS:  Cardiac silhouette is enlarged.  Mediastinal silhouette and pulmonary vascularity is unremarkable.  The lungs are grossly clear.  No pneumothorax or pleural effusions.  Bony  structures are intact.       Cardiomegaly.  No active cardiopulmonary disease.       DANIELLE HERRON DO       Electronically Signed and Approved By: DANIELLE HERRON DO on 3/17/2023 at 15:30             CT Head Without Contrast Stroke Protocol    Result Date: 3/17/2023  PROCEDURE: CT HEAD WO CONTRAST STROKE PROTOCOL  COMPARISON:  None  INDICATIONS: Neuro deficit, acute, stroke suspected  PROTOCOL:   Standard imaging protocol performed    RADIATION:   DLP: 1018.2 mGy*cm   MA and/or KV was adjusted to minimize radiation dose.    TECHNIQUE: CT images were obtained without non-ionic intravenous contrast material.  FINDINGS:  The ventricles, sulci, and cerebellar folia are moderately and diffusely prominent consistent with atrophy.  Ill-defined diminished density in cerebral white matter is consistent with moderate gliosis and/or numerous lacunar infarcts.  1 cm area of encephalomalacia in the region of the head of the caudate nucleus on the left and 7 mm area of encephalomalacia in right periventricular white matter are consistent with old infarcts.  There is no CT evidence of acute intracranial hemorrhage, mass, or mass effect.  The orbits have a normal appearance.  The paranasal sinuses, middle ears, and mastoid air cells are well aerated.  No fractures are seen on bone window images.        CT scan of the head without IV contrast demonstrating diffuse atrophy and white matter changes.  Areas of encephalomalacia are consistent with old infarcts.     MALATHI HEBERT MD       Electronically Signed and Approved By: MALATHI HEBERT MD on 3/17/2023 at 13:57             CT Angiogram Head w AI Analysis of LVO    Result Date: 3/17/2023  PROCEDURE: CT ANGIOGRAM HEAD W AI ANALYSIS OF LVO, 3/17/2023, 14:13 CT ANGIOGRAM NECK, 3/17/2023, 14:13  COMPARISON: [facility], CT, CT CEREBRAL PERFUSION W WO CONTRAST, 3/17/2023, 14:06.  [facility], CT, CT HEAD WO CONTRAST STROKE PROTOCOL, 3/17/2023, 13:49.   INDICATIONS: Neuro deficit, acute, stroke suspected  PROTOCOL:   Standard imaging protocol performed    RADIATION:   DLP: 1104 mGy*cm   Automated exposure control was utilized to minimize radiation dose. CONTRAST: 75 cc Isovue 370 I.V. RAPID: CTA imaging was analyzed using RAPID AI to enable computer assisted triage notification to rapidly detect a large vessel occlusion (LVO) and shorten notification time  TECHNIQUE: After obtaining the patient's consent, CT images of the head were obtained without and with non-ionic contrast, and multi-planar/3-D imaging were created and interpreted to optimize visualization of vascular anatomy.   FINDINGS:  Vascular findings: CTA head:  The right P1 segment of the posterior cerebral artery is diffusely small with diffusely prominent right posterior communicating artery, this is anatomic variation.  No large vessel occlusions or significant focal stenosis or aneurysms are identified.  There is moderate degree of calcification in the intracranial portions of the ICAs bilaterally mild luminal irregularity in the distal right M1 segment of the middle cerebral artery.  There is mild luminal irregularity in the the left PCA without significant focal stenosis.  The right vertebral artery is dominant with mild to moderate diffuse atherosclerotic disease.  The basilar artery is patent with mild luminal irregularity.  CTA neck: Right-sided findings:  The right common carotid artery are widely patent without disease.  There is a mild degree of calcification in the carotid bulb.  The right external carotid artery and cervical portion of the right ICA are widely patent without significant disease.  The left vertebral artery is non dominant and is widely patent.  Left-sided findings:  The brachiocephalic artery is mildly tortuous mild diffuse disease.  The right common carotid artery is patent.  There is mild to moderate amount of atherosclerotic disease in the carotid bulb.  The right  internal carotid artery is mildly tortuous with mild diffuse disease.  No hemodynamically significant stenosis by NASCET criteria is seen.  The right vertebral artery is dominant and wide and widely patent.  Nonvascular findings.  No pathologic enhancement is seen in the brain.  There is a areas old lacunar infarcts and chronic small vessel ischemic disease as seen on the noncontrast CT.  No cervical adenopathy or acute fluid collections or inflammatory changes are seen in the soft tissues of the neck.  The thyroid gland is diffusely enlarged.  There is degenerative disc disease in the cervical spine.       CTA head:  No significant focal stenosis large vessel occlusions or aneurysms are demonstrated.  CTA neck:  Mild degree of calcification in the right carotid bulb and mild to moderate amount of disease in the left carotid bulb.  No hemodynamically significant stenosis by NASCET criteria.       DANIELLE HERRON DO       Electronically Signed and Approved By: DANIELLE HERRON DO on 3/17/2023 at 15:51             CT CEREBRAL PERFUSION WITH & WITHOUT CONTRAST    Result Date: 3/17/2023  PROCEDURE: CT CEREBRAL PERFUSION W WO CONTRAST  COMPARISON: None  INDICATIONS: Neuro deficit, acute, stroke suspected  PROTOCOL:   Standard imaging protocol performed    RADIATION:   DLP: 1299.6 mGy*cm   Automated exposure control was utilized to minimize radiation dose. CONTRAST: 80 cc Isovue 370 I.V.   FINDINGS:  Nonspecific perfusion abnormality is seen, however, no core infarct or tissue at risk is evident.        CT cerebral perfusion study demonstrating nonspecific perfusion abnormality, as above.      MALATHI HEBERT MD       Electronically Signed and Approved By: MALATHI HEBERT MD on 3/17/2023 at 15:29               [x]  EKG/Telemetry   []  Cardiology/Vascular   []  Pathology  [x]  Old records  []  Other:    Assessment & Plan   Assessment / Plan     Assessment/Plan:  Assessment:  Acute ischemic infarction with left-sided  weakness  History of previous strokes  History of previous TIAs  Diabetes mellitus  Essential hypertension  Dyslipidemia    Plan:  Labs and imaging reviewed  Continue stroke pathway  PT/OT/speech  Diet per speech  Insulin sliding scale coverage  Permissive hypertension If blood pressure is greater than 220/120 give labetalol 10 mg IV every 4 hours with a goal of 15% reduction in BP during the first 24 hours.  Follow-up MRI brain  Follow-up echocardiogram  Mobilize per pathway  High intensity statin atorvastatin 80 mg nightly  Aspirin 81 mg daily  Brilinta 60 mg twice a day  Reconciled all other home medications, resumed accordingly  Nicotine patch  A.m. labs  Full code  Telemetry monitoring  DVT prophylaxis with SCDs  Clinical course dictate further management  Discussed with nurse at the bedside  DVT prophylaxis:  Mechanical DVT prophylaxis orders are present.    CODE STATUS:   Medical Intervention Limits: NO intubation (DNI)  Level Of Support Discussed With: Patient  Code Status (Patient has no pulse and is not breathing): No CPR (Do Not Attempt to Resuscitate)  Medical Interventions (Patient has pulse or is breathing): Limited Support  Release to patient: Routine Release        Electronically signed by Eloy Moon MD, 03/18/23, 10:37 AM EDT.    Portions of this documentation were transcribed electronically from a voice recognition software.  I confirm all data accurately represents the service(s) I performed at today's visit.               Electronically signed by Eloy Moon MD at 03/18/23 1037       Consult Notes (last 24 hours)  Notes from 03/18/23 0828 through 03/19/23 0828   No notes of this type exist for this encounter.       AWAIT NEURO INPUT

## 2023-03-19 NOTE — PLAN OF CARE
Goal Outcome Evaluation:   Patient is alert and oriented. Adlib to the bathroom. Call light within reach. Plan of care ongoing.

## 2023-03-19 NOTE — PLAN OF CARE
Goal Outcome Evaluation:  Plan of Care Reviewed With: patient, spouse        Progress: improving  Outcome Evaluation: Alert and oriented x 4. VSS.  NIH=0.  Showered at beginning of shift.  Wife at bedside.  Rested in bed with eyes closed most of shift.  Plan to go home at discharge. [facility]

## 2023-03-20 ENCOUNTER — READMISSION MANAGEMENT (OUTPATIENT)
Dept: CALL CENTER | Facility: HOSPITAL | Age: 63
End: 2023-03-20
Payer: COMMERCIAL

## 2023-03-20 VITALS
RESPIRATION RATE: 20 BRPM | HEART RATE: 63 BPM | TEMPERATURE: 98.1 F | DIASTOLIC BLOOD PRESSURE: 97 MMHG | HEIGHT: 75 IN | OXYGEN SATURATION: 97 % | WEIGHT: 258.38 LBS | SYSTOLIC BLOOD PRESSURE: 182 MMHG | BODY MASS INDEX: 32.13 KG/M2

## 2023-03-20 PROBLEM — I50.22 CHRONIC HFREF (HEART FAILURE WITH REDUCED EJECTION FRACTION): Status: ACTIVE | Noted: 2023-03-20

## 2023-03-20 LAB
ALBUMIN SERPL-MCNC: 3.9 G/DL (ref 3.5–5.2)
ALP SERPL-CCNC: 119 U/L (ref 39–117)
ALT SERPL W P-5'-P-CCNC: 19 U/L (ref 1–41)
ANION GAP SERPL CALCULATED.3IONS-SCNC: 8.8 MMOL/L (ref 5–15)
AST SERPL-CCNC: 16 U/L (ref 1–40)
BASOPHILS # BLD AUTO: 0.03 10*3/MM3 (ref 0–0.2)
BASOPHILS NFR BLD AUTO: 0.5 % (ref 0–1.5)
BILIRUB CONJ SERPL-MCNC: <0.2 MG/DL (ref 0–0.3)
BILIRUB INDIRECT SERPL-MCNC: ABNORMAL MG/DL
BILIRUB SERPL-MCNC: 0.5 MG/DL (ref 0–1.2)
BUN SERPL-MCNC: 19 MG/DL (ref 8–23)
BUN/CREAT SERPL: 19.6 (ref 7–25)
CALCIUM SPEC-SCNC: 9.1 MG/DL (ref 8.6–10.5)
CHLORIDE SERPL-SCNC: 104 MMOL/L (ref 98–107)
CO2 SERPL-SCNC: 27.2 MMOL/L (ref 22–29)
CREAT SERPL-MCNC: 0.97 MG/DL (ref 0.76–1.27)
DEPRECATED RDW RBC AUTO: 42 FL (ref 37–54)
EGFRCR SERPLBLD CKD-EPI 2021: 88.3 ML/MIN/1.73
EOSINOPHIL # BLD AUTO: 0.25 10*3/MM3 (ref 0–0.4)
EOSINOPHIL NFR BLD AUTO: 4.4 % (ref 0.3–6.2)
ERYTHROCYTE [DISTWIDTH] IN BLOOD BY AUTOMATED COUNT: 13.5 % (ref 12.3–15.4)
GLUCOSE BLDC GLUCOMTR-MCNC: 108 MG/DL (ref 70–99)
GLUCOSE BLDC GLUCOMTR-MCNC: 115 MG/DL (ref 70–99)
GLUCOSE SERPL-MCNC: 108 MG/DL (ref 65–99)
HCT VFR BLD AUTO: 43.8 % (ref 37.5–51)
HGB BLD-MCNC: 14.9 G/DL (ref 13–17.7)
IMM GRANULOCYTES # BLD AUTO: 0.03 10*3/MM3 (ref 0–0.05)
IMM GRANULOCYTES NFR BLD AUTO: 0.5 % (ref 0–0.5)
LYMPHOCYTES # BLD AUTO: 1.71 10*3/MM3 (ref 0.7–3.1)
LYMPHOCYTES NFR BLD AUTO: 29.9 % (ref 19.6–45.3)
MAGNESIUM SERPL-MCNC: 2 MG/DL (ref 1.6–2.4)
MCH RBC QN AUTO: 29 PG (ref 26.6–33)
MCHC RBC AUTO-ENTMCNC: 34 G/DL (ref 31.5–35.7)
MCV RBC AUTO: 85.2 FL (ref 79–97)
MONOCYTES # BLD AUTO: 0.84 10*3/MM3 (ref 0.1–0.9)
MONOCYTES NFR BLD AUTO: 14.7 % (ref 5–12)
NEUTROPHILS NFR BLD AUTO: 2.86 10*3/MM3 (ref 1.7–7)
NEUTROPHILS NFR BLD AUTO: 50 % (ref 42.7–76)
NRBC BLD AUTO-RTO: 0 /100 WBC (ref 0–0.2)
PHOSPHATE SERPL-MCNC: 3.7 MG/DL (ref 2.5–4.5)
PLATELET # BLD AUTO: 174 10*3/MM3 (ref 140–450)
PMV BLD AUTO: 9.9 FL (ref 6–12)
POTASSIUM SERPL-SCNC: 4 MMOL/L (ref 3.5–5.2)
PROT SERPL-MCNC: 6.2 G/DL (ref 6–8.5)
RBC # BLD AUTO: 5.14 10*6/MM3 (ref 4.14–5.8)
SODIUM SERPL-SCNC: 140 MMOL/L (ref 136–145)
WBC NRBC COR # BLD: 5.72 10*3/MM3 (ref 3.4–10.8)

## 2023-03-20 PROCEDURE — 94799 UNLISTED PULMONARY SVC/PX: CPT

## 2023-03-20 PROCEDURE — 82962 GLUCOSE BLOOD TEST: CPT

## 2023-03-20 PROCEDURE — 83735 ASSAY OF MAGNESIUM: CPT | Performed by: FAMILY MEDICINE

## 2023-03-20 PROCEDURE — 99239 HOSP IP/OBS DSCHRG MGMT >30: CPT | Performed by: FAMILY MEDICINE

## 2023-03-20 PROCEDURE — 80048 BASIC METABOLIC PNL TOTAL CA: CPT | Performed by: FAMILY MEDICINE

## 2023-03-20 PROCEDURE — 84100 ASSAY OF PHOSPHORUS: CPT | Performed by: FAMILY MEDICINE

## 2023-03-20 PROCEDURE — 85025 COMPLETE CBC W/AUTO DIFF WBC: CPT | Performed by: FAMILY MEDICINE

## 2023-03-20 PROCEDURE — 80076 HEPATIC FUNCTION PANEL: CPT | Performed by: FAMILY MEDICINE

## 2023-03-20 RX ORDER — AMLODIPINE BESYLATE 5 MG/1
5 TABLET ORAL
Status: DISCONTINUED | OUTPATIENT
Start: 2023-03-20 | End: 2023-03-20 | Stop reason: HOSPADM

## 2023-03-20 RX ORDER — ASPIRIN 81 MG/1
81 TABLET, CHEWABLE ORAL DAILY
Qty: 30 TABLET | Refills: 0 | Status: SHIPPED | OUTPATIENT
Start: 2023-03-20 | End: 2023-04-19

## 2023-03-20 RX ORDER — METOPROLOL SUCCINATE 25 MG/1
25 TABLET, EXTENDED RELEASE ORAL
Status: DISCONTINUED | OUTPATIENT
Start: 2023-03-20 | End: 2023-03-20 | Stop reason: HOSPADM

## 2023-03-20 RX ORDER — AMLODIPINE BESYLATE 5 MG/1
10 TABLET ORAL
Qty: 60 TABLET | Refills: 0 | Status: SHIPPED | OUTPATIENT
Start: 2023-03-20 | End: 2023-04-19

## 2023-03-20 RX ORDER — METOPROLOL SUCCINATE 25 MG/1
25 TABLET, EXTENDED RELEASE ORAL
Qty: 30 TABLET | Refills: 0 | Status: SHIPPED | OUTPATIENT
Start: 2023-03-20 | End: 2023-04-19

## 2023-03-20 RX ADMIN — TICAGRELOR 60 MG: 60 TABLET ORAL at 09:25

## 2023-03-20 RX ADMIN — METOPROLOL SUCCINATE 25 MG: 25 TABLET, EXTENDED RELEASE ORAL at 09:24

## 2023-03-20 RX ADMIN — ASPIRIN 81 MG 81 MG: 81 TABLET ORAL at 09:25

## 2023-03-20 RX ADMIN — AMLODIPINE BESYLATE 5 MG: 5 TABLET ORAL at 09:25

## 2023-03-20 RX ADMIN — TAMSULOSIN HYDROCHLORIDE 0.4 MG: 0.4 CAPSULE ORAL at 09:25

## 2023-03-20 RX ADMIN — LISINOPRIL 40 MG: 20 TABLET ORAL at 09:25

## 2023-03-20 NOTE — DISCHARGE SUMMARY
[facility]         HOSPITALIST  DISCHARGE SUMMARY    Patient Name: Lester Keating  : 1960  MRN: [id number]    Date of Admission: 3/17/2023  Date of Discharge:  3/20/2023    Primary Care Physician: Carlos Townsend APRN    Consults     Date and Time Order Name Status Description    3/19/2023 11:49 AM Inpatient Cardiology Consult      3/18/2023  8:00 AM Inpatient Neurology Consult Stroke      3/17/2023  4:16 PM Inpatient Hospitalist Consult            Active and Resolved Hospital Problems:  Acute ischemic infarction involving the posterior limb of the right internal capsule with left-sided weakness  New diagnosis of systolic and dysfunction, EF in the 40s  History of previous strokes  History of previous TIAs  Diabetes mellitus  Essential hypertension uncontrolled  Dyslipidemia    Active Hospital Problems    Diagnosis POA   • **Cerebrovascular accident (CVA), unspecified mechanism ([facility]) [I63.9] Yes   • Chronic HFrEF (heart failure with reduced ejection fraction) ([facility]) [I50.22] Unknown   • Benign essential HTN [I10] Yes   • Type 2 diabetes mellitus ([facility]) [E11.9] Yes   • Hyperlipidemia [E78.5] Yes      Resolved Hospital Problems   No resolved problems to display.       Hospital Course     Hospital Course:  62-year-old male with history of diabetes, hypertension, history of TIA, history of multiple prior lacunar strokes, left pontine, left anterior corpus callosum, has had tPA in the past, left frontal stroke, chronic hemorrhage on prior MRI brain, diabetes mellitus, prior left-sided weakness primarily his left lower extremity which was reportedly resolved, difficulty ambulating, left numbness and weakness to his face, has had diagnosis of TIAs in the past, has been on aspirin, changed to Plavix prior to hospitalization, CT head with encephalomalacia consistent with old infarctions, CT angio of the head shows no focal stenosis or large vessel occlusions, CTA of the neck shows mild calcification  in the right carotid bulb and mild to moderate disease in the left carotid bulb, no hemodynamically significant stenosis, MRI revealed acute stroke involving the right internal capsule posterior limb, was not a candidate for tPA, cardiology consulted after echo came back with abnormalities including diminished EF and diastolic dysfunction; new, started beta-blocker, resumed ACE inhibitor, on aspirin, dose titrated for beta-blocker in addition to calcium channel blocker to achieve adequate blood pressure control, once blood pressure was improved by 20%, he was discharged in hemodynamically stable condition on 3/20/2023 to follow-up with cardiology in addition to neurology, as well as his PCP to ensure blood pressure control is maintained.  High risk for readmission and for restroke if blood pressures are not adequately controlled.    Day of Discharge     Vital Signs:  Temp:  [97.2 °F (36.2 °C)-98.1 °F (36.7 °C)] 98.1 °F (36.7 °C)  Heart Rate:  [60-67] 63  Resp:  [20] 20  BP: (130-189)/(67-97) 182/97  Review of systems:  All systems reviewed and negative except for fatigue    Physical Exam                         Constitutional: Awake, alert, no acute distress, lying in bed              Eyes: Pupils equal, sclerae anicteric, no conjunctival injection              HENT: NCAT, mucous membranes moist              Neck: Supple, no thyromegaly, no lymphadenopathy, trachea midline              Respiratory: Clear to auscultation bilaterally, nonlabored respirations               Cardiovascular: RRR, no murmurs, rubs, or gallops, palpable pedal pulses bilaterally              Gastrointestinal: Positive bowel sounds, soft, nontender, nondistended              Musculoskeletal: No bilateral ankle edema, no clubbing or cyanosis to extremities              Psychiatric: Appropriate affect, cooperative              Neurologic: Oriented x 3, strength symmetric in all extremities except his left leg which is 4 out of 5 strength,  Cranial Nerves grossly intact to confrontation, speech clear              Skin: No rashes visible on exposed skin      Discharge Details        Discharge Medications      New Medications      Instructions Start Date   amLODIPine 5 MG tablet  Commonly known as: NORVASC   10 mg, Oral, Every 24 Hours Scheduled      aspirin 81 MG chewable tablet   Chew & swallow 1 tablet Daily for 30 days.      metoprolol succinate XL 25 MG 24 hr tablet  Commonly known as: TOPROL-XL   25 mg, Oral, Every 24 Hours Scheduled         Changes to Medications      Instructions Start Date   Trulicity 0.75 MG/0.5ML solution pen-injector  Generic drug: Dulaglutide  What changed: additional instructions   0.75 mg, Subcutaneous, Every 7 Days         Continue These Medications      Instructions Start Date   atorvastatin 80 MG tablet  Commonly known as: LIPITOR   80 mg, Oral, Daily      B-12 500 MCG sublingual tablet   1,000 m, Sublingual, Daily      Brilinta 60 MG tablet tablet  Generic drug: ticagrelor   60 mg, Oral, 2 Times Daily      Jardiance 25 MG tablet tablet  Generic drug: empagliflozin   25 mg, Oral, Daily      lisinopril 40 MG tablet  Commonly known as: PRINIVIL,ZESTRIL   40 mg, Oral, Daily      metFORMIN 1000 MG tablet  Commonly known as: GLUCOPHAGE   1,000 mg, Oral, Daily With Breakfast & Dinner      nicotine 21 MG/24HR patch  Commonly known as: NICODERM CQ   1 patch, Transdermal, Daily      tamsulosin 0.4 MG capsule 24 hr capsule  Commonly known as: FLOMAX   0.4 mg, Oral, Daily             No Known Allergies    Discharge Disposition:  Home or Self Care    Diet:  Hospital:  Diet Order   Procedures   • Diet: Diabetic Diets; Consistent Carbohydrate; Texture: Regular Texture (IDDSI 7); Fluid Consistency: Thin (IDDSI 0)       Discharge Activity:   Activity Instructions     Measure Blood Pressure            CODE STATUS:  Code Status and Medical Interventions:   Ordered at: 03/17/23 1217     Medical Intervention Limits:    NO intubation (DNI)      Level Of Support Discussed With:    Patient     Code Status (Patient has no pulse and is not breathing):    No CPR (Do Not Attempt to Resuscitate)     Medical Interventions (Patient has pulse or is breathing):    Limited Support     Release to patient:    Routine Release         Future Appointments   Date Time Provider Department Center   3/29/2023  2:00 PM Lashay Leonardo APRN [facility] [place] [facility]   4/18/2023  1:15 PM Yanet Yo APRARTEM [facility] [place] [facility]       Additional Instructions for the Follow-ups that You Need to Schedule     Discharge Follow-up with PCP   As directed       Currently Documented PCP:    Carlos Townsend APRN    PCP Phone Number:    [phone]     Follow Up Details: 3 to 7 days         Discharge Follow-up with Specified Provider: Dr Mayorga in 4 weeks   As directed      To: Dr Mayorga in 4 weeks        Additional information on Labs and Follow-ups:    See above            Pertinent  and/or Most Recent Results     PROCEDURES:   Adult Transthoracic Echo Complete W/ Cont if Necessary Per Protocol (With Agitated Saline)    Result Date: 3/19/2023  •  Left ventricular ejection fraction appears to be 41 - 45%. •  Left ventricular wall thickness is consistent with mild concentric hypertrophy. •  Left ventricular diastolic function is consistent with (grade I) impaired relaxation. •  Moderate dilation of the aortic root is present. There were no apparent intracardiac masses, vegetations or thrombi.     CT Angiogram Neck    Result Date: 3/17/2023  PROCEDURE: CT ANGIOGRAM HEAD W AI ANALYSIS OF LVO, 3/17/2023, 14:13 CT ANGIOGRAM NECK, 3/17/2023, 14:13  COMPARISON: [facility], CT, CT CEREBRAL PERFUSION W WO CONTRAST, 3/17/2023, 14:06.  [facility], CT, CT HEAD WO CONTRAST STROKE PROTOCOL, 3/17/2023, 13:49.  INDICATIONS: Neuro deficit, acute, stroke suspected  PROTOCOL:   Standard imaging protocol performed    RADIATION:   DLP: 1104 mGy*cm   Automated exposure control  was utilized to minimize radiation dose. CONTRAST: 75 cc Isovue 370 I.V. RAPID: CTA imaging was analyzed using RAPID AI to enable computer assisted triage notification to rapidly detect a large vessel occlusion (LVO) and shorten notification time  TECHNIQUE: After obtaining the patient's consent, CT images of the head were obtained without and with non-ionic contrast, and multi-planar/3-D imaging were created and interpreted to optimize visualization of vascular anatomy.   FINDINGS:  Vascular findings: CTA head:  The right P1 segment of the posterior cerebral artery is diffusely small with diffusely prominent right posterior communicating artery, this is anatomic variation.  No large vessel occlusions or significant focal stenosis or aneurysms are identified.  There is moderate degree of calcification in the intracranial portions of the ICAs bilaterally mild luminal irregularity in the distal right M1 segment of the middle cerebral artery.  There is mild luminal irregularity in the the left PCA without significant focal stenosis.  The right vertebral artery is dominant with mild to moderate diffuse atherosclerotic disease.  The basilar artery is patent with mild luminal irregularity.  CTA neck: Right-sided findings:  The right common carotid artery are widely patent without disease.  There is a mild degree of calcification in the carotid bulb.  The right external carotid artery and cervical portion of the right ICA are widely patent without significant disease.  The left vertebral artery is non dominant and is widely patent.  Left-sided findings:  The brachiocephalic artery is mildly tortuous mild diffuse disease.  The right common carotid artery is patent.  There is mild to moderate amount of atherosclerotic disease in the carotid bulb.  The right internal carotid artery is mildly tortuous with mild diffuse disease.  No hemodynamically significant stenosis by NASCET criteria is seen.  The right vertebral artery is  dominant and wide and widely patent.  Nonvascular findings.  No pathologic enhancement is seen in the brain.  There is a areas old lacunar infarcts and chronic small vessel ischemic disease as seen on the noncontrast CT.  No cervical adenopathy or acute fluid collections or inflammatory changes are seen in the soft tissues of the neck.  The thyroid gland is diffusely enlarged.  There is degenerative disc disease in the cervical spine.       CTA head:  No significant focal stenosis large vessel occlusions or aneurysms are demonstrated.  CTA neck:  Mild degree of calcification in the right carotid bulb and mild to moderate amount of disease in the left carotid bulb.  No hemodynamically significant stenosis by NASCET criteria.       DANIELLE HERRON DO       Electronically Signed and Approved By: DANIELLE HERRON DO on 3/17/2023 at 15:51             MRI Brain Without Contrast    Result Date: 3/18/2023  PROCEDURE: MRI BRAIN WO CONTRAST  COMPARISON: None  INDICATIONS: dizziness, left upper/lower extremity paresthesia      TECHNIQUE: A variety of imaging planes and parameters were utilized for visualization of suspected pathology.  Images were performed without contrast.  FINDINGS:  There is an acute lacunar infarct located in the posterior limb of the right internal capsule.  It has a maximum diameter of 9 mm.  There are no additional areas of abnormal restricted diffusion.  The patient has chronic volume loss with enlargement of the sulci, fissures, ventricles, and basal cisterns.  There is an old lacunar infarct in the left pontomedullary junction.  There is a combination of chronic microvascular ischemia and old lacunar infarcts within the white matter tracts.  There is no acute hemorrhage, midline shift, or suspicious extra-axial fluid collections.  There are normal signal voids within the intracranial arteries and the major dural sinuses.  Orbital contents are normal.  There is fluid/mucosal thickening in the left  mastoid sinus.  The right mastoid sinus is clear.  There are no air-fluid levels in the paranasal sinuses.         1. 9 mm acute lacunar infarct in the posterior limb of the right internal capsule. 2. Volume loss secondary to cerebral atrophy. 3. Chronic ischemic changes. 4. Left mastoid sinus disease.      BARBI GUITERREZ MD       Electronically Signed and Approved By: BARBI GUTIERREZ MD on 3/18/2023 at 17:20             XR Chest 1 View    Result Date: 3/17/2023  PROCEDURE: XR CHEST 1 VW  COMPARISON: None  INDICATIONS: Acute Stroke Protocol (Onset < 12 hrs)  FINDINGS:  Cardiac silhouette is enlarged.  Mediastinal silhouette and pulmonary vascularity is unremarkable.  The lungs are grossly clear.  No pneumothorax or pleural effusions.  Bony structures are intact.       Cardiomegaly.  No active cardiopulmonary disease.       DANIELLE HERRON DO       Electronically Signed and Approved By: DANIELLE HERRON DO on 3/17/2023 at 15:30             CT Head Without Contrast Stroke Protocol    Result Date: 3/17/2023  PROCEDURE: CT HEAD WO CONTRAST STROKE PROTOCOL  COMPARISON:  None  INDICATIONS: Neuro deficit, acute, stroke suspected  PROTOCOL:   Standard imaging protocol performed    RADIATION:   DLP: 1018.2 mGy*cm   MA and/or KV was adjusted to minimize radiation dose.    TECHNIQUE: CT images were obtained without non-ionic intravenous contrast material.  FINDINGS:  The ventricles, sulci, and cerebellar folia are moderately and diffusely prominent consistent with atrophy.  Ill-defined diminished density in cerebral white matter is consistent with moderate gliosis and/or numerous lacunar infarcts.  1 cm area of encephalomalacia in the region of the head of the caudate nucleus on the left and 7 mm area of encephalomalacia in right periventricular white matter are consistent with old infarcts.  There is no CT evidence of acute intracranial hemorrhage, mass, or mass effect.  The orbits have a normal appearance.  The paranasal sinuses, middle  ears, and mastoid air cells are well aerated.  No fractures are seen on bone window images.        CT scan of the head without IV contrast demonstrating diffuse atrophy and white matter changes.  Areas of encephalomalacia are consistent with old infarcts.     MALATHI HEBERT MD       Electronically Signed and Approved By: MALATHI HEBERT MD on 3/17/2023 at 13:57             CT Angiogram Head w AI Analysis of LVO    Result Date: 3/17/2023  PROCEDURE: CT ANGIOGRAM HEAD W AI ANALYSIS OF LVO, 3/17/2023, 14:13 CT ANGIOGRAM NECK, 3/17/2023, 14:13  COMPARISON: [facility], CT, CT CEREBRAL PERFUSION W WO CONTRAST, 3/17/2023, 14:06.  [facility], CT, CT HEAD WO CONTRAST STROKE PROTOCOL, 3/17/2023, 13:49.  INDICATIONS: Neuro deficit, acute, stroke suspected  PROTOCOL:   Standard imaging protocol performed    RADIATION:   DLP: 1104 mGy*cm   Automated exposure control was utilized to minimize radiation dose. CONTRAST: 75 cc Isovue 370 I.V. RAPID: CTA imaging was analyzed using RAPID AI to enable computer assisted triage notification to rapidly detect a large vessel occlusion (LVO) and shorten notification time  TECHNIQUE: After obtaining the patient's consent, CT images of the head were obtained without and with non-ionic contrast, and multi-planar/3-D imaging were created and interpreted to optimize visualization of vascular anatomy.   FINDINGS:  Vascular findings: CTA head:  The right P1 segment of the posterior cerebral artery is diffusely small with diffusely prominent right posterior communicating artery, this is anatomic variation.  No large vessel occlusions or significant focal stenosis or aneurysms are identified.  There is moderate degree of calcification in the intracranial portions of the ICAs bilaterally mild luminal irregularity in the distal right M1 segment of the middle cerebral artery.  There is mild luminal irregularity in the the left PCA without significant focal stenosis.  The right  vertebral artery is dominant with mild to moderate diffuse atherosclerotic disease.  The basilar artery is patent with mild luminal irregularity.  CTA neck: Right-sided findings:  The right common carotid artery are widely patent without disease.  There is a mild degree of calcification in the carotid bulb.  The right external carotid artery and cervical portion of the right ICA are widely patent without significant disease.  The left vertebral artery is non dominant and is widely patent.  Left-sided findings:  The brachiocephalic artery is mildly tortuous mild diffuse disease.  The right common carotid artery is patent.  There is mild to moderate amount of atherosclerotic disease in the carotid bulb.  The right internal carotid artery is mildly tortuous with mild diffuse disease.  No hemodynamically significant stenosis by NASCET criteria is seen.  The right vertebral artery is dominant and wide and widely patent.  Nonvascular findings.  No pathologic enhancement is seen in the brain.  There is a areas old lacunar infarcts and chronic small vessel ischemic disease as seen on the noncontrast CT.  No cervical adenopathy or acute fluid collections or inflammatory changes are seen in the soft tissues of the neck.  The thyroid gland is diffusely enlarged.  There is degenerative disc disease in the cervical spine.       CTA head:  No significant focal stenosis large vessel occlusions or aneurysms are demonstrated.  CTA neck:  Mild degree of calcification in the right carotid bulb and mild to moderate amount of disease in the left carotid bulb.  No hemodynamically significant stenosis by NASCET criteria.       DANIELLE HERRON DO       Electronically Signed and Approved By: DANIELLE HERRON DO on 3/17/2023 at 15:51             CT CEREBRAL PERFUSION WITH & WITHOUT CONTRAST    Result Date: 3/17/2023  PROCEDURE: CT CEREBRAL PERFUSION W WO CONTRAST  COMPARISON: None  INDICATIONS: Neuro deficit, acute, stroke suspected  PROTOCOL:    Standard imaging protocol performed    RADIATION:   DLP: 1299.6 mGy*cm   Automated exposure control was utilized to minimize radiation dose. CONTRAST: 80 cc Isovue 370 I.V.   FINDINGS:  Nonspecific perfusion abnormality is seen, however, no core infarct or tissue at risk is evident.        CT cerebral perfusion study demonstrating nonspecific perfusion abnormality, as above.      MALATHI HEBERT MD       Electronically Signed and Approved By: MALATHI HEBERT MD on 3/17/2023 at 15:29               LAB RESULTS:      Lab 03/20/23 0427 03/19/23 0455 03/18/23 0423 03/17/23  1359   WBC 5.72 5.31 5.32 6.47   HEMOGLOBIN 14.9 14.4 14.3 15.5   HEMATOCRIT 43.8 41.3 41.1 45.6   PLATELETS 174 167 178 212   NEUTROS ABS 2.86 3.05 2.86 4.09   IMMATURE GRANS (ABS) 0.03 0.02 0.02 0.02   LYMPHS ABS 1.71 1.33 1.47 1.52   MONOS ABS 0.84 0.66 0.73 0.68   EOS ABS 0.25 0.22 0.20 0.12   MCV 85.2 83.6 84.2 84.8   PROTIME  --   --   --  12.9   APTT  --   --   --  30.5         Lab 03/20/23 0427 03/19/23 0454 03/18/23 0423 03/17/23  1359   SODIUM 140 140 138 140   POTASSIUM 4.0 3.8 3.5 3.9   CHLORIDE 104 107 105 102   CO2 27.2 22.5 23.7 26.3   ANION GAP 8.8 10.5 9.3 11.7   BUN 19 19 19 19   CREATININE 0.97 0.81 0.93 1.22   EGFR 88.3 99.7 92.8 67.0   GLUCOSE 108* 110* 111* 159*   CALCIUM 9.1 8.8 9.2 9.7   MAGNESIUM 2.0 1.9  --   --    PHOSPHORUS 3.7 3.6  --   --    HEMOGLOBIN A1C  --   --  6.60*  --          Lab 03/20/23 0427 03/19/23 0454 03/17/23  1359   TOTAL PROTEIN 6.2 6.2 7.5   ALBUMIN 3.9 3.7 4.6   GLOBULIN  --   --  2.9   ALT (SGPT) 19 18 28   AST (SGOT) 16 14 25   BILIRUBIN 0.5 0.4 0.5   BILIRUBIN DIRECT <0.2 <0.2  --    ALK PHOS 119* 122* 140*         Lab 03/17/23  1359   HSTROP T 23*   PROTIME 12.9   INR 0.96         Lab 03/18/23  0423   CHOLESTEROL 104   LDL CHOL 53   HDL CHOL 31*   TRIGLYCERIDES 104         Lab 03/17/23  1840 03/17/23  1359   ABO TYPING O O   RH TYPING Positive Positive   ANTIBODY SCREEN  --  Negative          Brief Urine Lab Results  (Last result in the past 365 days)      Color   Clarity   Blood   Leuk Est   Nitrite   Protein   CREAT   Urine HCG        03/17/23 1537 Yellow   Clear   Negative   Negative   Negative   100 mg/dL (2+)               Microbiology Results (last 10 days)     ** No results found for the last 240 hours. **          CT Angiogram Neck    Result Date: 3/17/2023  Impression:  CTA head:  No significant focal stenosis large vessel occlusions or aneurysms are demonstrated.  CTA neck:  Mild degree of calcification in the right carotid bulb and mild to moderate amount of disease in the left carotid bulb.  No hemodynamically significant stenosis by NASCET criteria.       DANIELLE HERRON DO       Electronically Signed and Approved By: DANIELEL HERRON DO on 3/17/2023 at 15:51             MRI Brain Without Contrast    Result Date: 3/18/2023  Impression:   1. 9 mm acute lacunar infarct in the posterior limb of the right internal capsule. 2. Volume loss secondary to cerebral atrophy. 3. Chronic ischemic changes. 4. Left mastoid sinus disease.      BARBI GUTIERREZ MD       Electronically Signed and Approved By: BARBI GUTIERREZ MD on 3/18/2023 at 17:20             XR Chest 1 View    Result Date: 3/17/2023  Impression:  Cardiomegaly.  No active cardiopulmonary disease.       DANIELLE HERRON DO       Electronically Signed and Approved By: DANIELLE HERRON DO on 3/17/2023 at 15:30             CT Head Without Contrast Stroke Protocol    Result Date: 3/17/2023  Impression:   CT scan of the head without IV contrast demonstrating diffuse atrophy and white matter changes.  Areas of encephalomalacia are consistent with old infarcts.     MALATHI HEBERT MD       Electronically Signed and Approved By: MALATHI HEBERT MD on 3/17/2023 at 13:57             CT Angiogram Head w AI Analysis of LVO    Result Date: 3/17/2023  Impression:  CTA head:  No significant focal stenosis large vessel occlusions or aneurysms are demonstrated.  CTA neck:   Mild degree of calcification in the right carotid bulb and mild to moderate amount of disease in the left carotid bulb.  No hemodynamically significant stenosis by NASCET criteria.       DANIELLE HERRON DO       Electronically Signed and Approved By: DANIELLE HERRON DO on 3/17/2023 at 15:51             CT CEREBRAL PERFUSION WITH & WITHOUT CONTRAST    Result Date: 3/17/2023  Impression:   CT cerebral perfusion study demonstrating nonspecific perfusion abnormality, as above.      MALATHI HEBERT MD       Electronically Signed and Approved By: MALATHI HEBERT MD on 3/17/2023 at 15:29                       Results for orders placed during the hospital encounter of 03/17/23    Adult Transthoracic Echo Complete W/ Cont if Necessary Per Protocol (With Agitated Saline)    Interpretation Summary  •  Left ventricular ejection fraction appears to be 41 - 45%.  •  Left ventricular wall thickness is consistent with mild concentric hypertrophy.  •  Left ventricular diastolic function is consistent with (grade I) impaired relaxation.  •  Moderate dilation of the aortic root is present.    There were no apparent intracardiac masses, vegetations or thrombi.      Labs Pending at Discharge: None        Time spent on Discharge including face to face service:  32 minutes    Electronically signed by Eloy Moon MD, 03/20/23, 4:18 PM EDT.    Portions of this documentation were transcribed electronically from a voice recognition software.  I confirm all data accurately represents the service(s) I performed at today's visit.

## 2023-03-20 NOTE — PLAN OF CARE
Goal Outcome Evaluation:  Plan of Care Reviewed With: patient        Progress: improving   VSS. Pt has rested this shift. Plans to discharge home

## 2023-03-20 NOTE — PLAN OF CARE
Goal Outcome Evaluation:  Plan of Care Reviewed With: patient    BP MUCH BETTER THIS SHIFT. NIH ZERO. NO ISSUES TO REPORT

## 2023-03-21 NOTE — OUTREACH NOTE
Prep Survey    Flowsheet Row Responses   [facility] facility patient discharged from? [facility]   Is LACE score < 7 ? No   Eligibility Readm Mgmt   Discharge diagnosis CVA   Does the patient have one of the following disease processes/diagnoses(primary or secondary)? Stroke   Does the patient have Home health ordered? No   Is there a DME ordered? No   Prep survey completed? Yes          Sheridan REYES - Registered Nurse

## 2023-03-24 ENCOUNTER — READMISSION MANAGEMENT (OUTPATIENT)
Dept: CALL CENTER | Facility: HOSPITAL | Age: 63
End: 2023-03-24
Payer: COMMERCIAL

## 2023-03-24 NOTE — OUTREACH NOTE
Stroke Week 1 Survey    Flowsheet Row Responses   [facility] patient discharged from? [facility]   Does the patient have one of the following disease processes/diagnoses(primary or secondary)? Stroke   Week 1 attempt successful? Yes   Call start time 1451   Call end time 1454   Discharge diagnosis CVA   Meds reviewed with patient/caregiver? Yes   Is the patient having any side effects they believe may be caused by any medication additions or changes? No   Does the patient have all medications ordered at discharge? Yes   Is the patient taking all medications as directed (includes completed medication regime)? Yes   Does the patient have a primary care provider?  Yes   Does the patient have an appointment with their PCP within 7 days of discharge? Greater than 7 days   Comments regarding PCP PCP follow up 4/6/23   What is preventing the patient from scheduling follow up appointments within 7 days of discharge? Earlier appointment not available   Nursing Interventions Verified appointment date/time/provider   Has the patient kept scheduled appointments due by today? N/A   Has home health visited the patient within 72 hours of discharge? N/A   Psychosocial issues? No   Does the patient require any assistance with activities of daily living such as eating, bathing, dressing, walking, etc.? No   Does the patient have any residual symptoms from stroke/TIA? Yes   Residual symptoms comments Loss of balance, weakness in left side   Does the patient understand the diet ordered at discharge? Yes   Did the patient receive a copy of their discharge instructions? Yes   Nursing interventions Reviewed instructions with patient   What is the patient's perception of their health status since discharge? Improving   Nursing interventions Nurse provided patient education   Is the patient/caregiver able to teach back the risk factors for a stroke? High blood pressure-goal below 120/80, Smoking, Physical inactivity and obesity, High  Cholesterol   Is the patient/caregiver able to teach back signs and symptoms related to disease process for when to call PCP? Yes   Is the patient/caregiver able to teach back signs and symptoms related to disease process for when to call 911? Yes   If the patient is a current smoker, are they able to teach back resources for cessation? Not a smoker  [recently quit smoking]   Is the patient/caregiver able to teach back the hierarchy of who to call/visit for symptoms/problems? PCP, Specialist, Home health nurse, Urgent Care, ED, 911 Yes   Is the patient able to teach back FAST for Stroke? S peech: Listen for slurred speech, A rm: Check if one arm is weak, F ace: Look for an uneven smile, E yes: Check for vision loss, B alance: Watch for sudden loss of balance, T kellee: Call 9-1-1 right away   Week 1 call completed? Yes   Is the patient interested in additional calls from an ambulatory ?  NOTE:  applies to high risk patients requiring additional follow-up. Charline Yepez T - Registered Nurse

## 2023-03-27 ENCOUNTER — LAB (OUTPATIENT)
Dept: LAB | Facility: HOSPITAL | Age: 63
End: 2023-03-27
Payer: COMMERCIAL

## 2023-03-27 DIAGNOSIS — R39.15 URINARY URGENCY: ICD-10-CM

## 2023-03-27 PROCEDURE — 36415 COLL VENOUS BLD VENIPUNCTURE: CPT

## 2023-03-27 PROCEDURE — 84153 ASSAY OF PSA TOTAL: CPT

## 2023-03-28 LAB — PSA SERPL-MCNC: 0.72 NG/ML (ref 0–4)

## 2023-03-29 ENCOUNTER — OFFICE VISIT (OUTPATIENT)
Dept: UROLOGY | Facility: CLINIC | Age: 63
End: 2023-03-29
Payer: COMMERCIAL

## 2023-03-29 VITALS — BODY MASS INDEX: 32.25 KG/M2 | HEIGHT: 75 IN | RESPIRATION RATE: 15 BRPM | WEIGHT: 259.4 LBS

## 2023-03-29 DIAGNOSIS — R39.15 URINARY URGENCY: Primary | ICD-10-CM

## 2023-03-29 LAB
BILIRUB BLD-MCNC: NEGATIVE MG/DL
CLARITY, POC: CLEAR
COLOR UR: YELLOW
EXPIRATION DATE: ABNORMAL
GLUCOSE UR STRIP-MCNC: ABNORMAL MG/DL
KETONES UR QL: NEGATIVE
LEUKOCYTE EST, POC: NEGATIVE
Lab: ABNORMAL
NITRITE UR-MCNC: NEGATIVE MG/ML
PH UR: 5.5 [PH] (ref 5–8)
PROT UR STRIP-MCNC: ABNORMAL MG/DL
QT INTERVAL: 382 MS
RBC # UR STRIP: ABNORMAL /UL
SP GR UR: 1.02 (ref 1–1.03)
URINE VOLUME: 56
UROBILINOGEN UR QL: ABNORMAL

## 2023-03-29 PROCEDURE — 99214 OFFICE O/P EST MOD 30 MIN: CPT | Performed by: NURSE PRACTITIONER

## 2023-03-29 PROCEDURE — 51798 US URINE CAPACITY MEASURE: CPT | Performed by: NURSE PRACTITIONER

## 2023-03-29 PROCEDURE — 81003 URINALYSIS AUTO W/O SCOPE: CPT | Performed by: NURSE PRACTITIONER

## 2023-03-29 RX ORDER — TAMSULOSIN HYDROCHLORIDE 0.4 MG/1
2 CAPSULE ORAL DAILY
Qty: 180 CAPSULE | Refills: 4 | Status: SHIPPED | OUTPATIENT
Start: 2023-03-29

## 2023-03-29 NOTE — PROGRESS NOTES
Chief Complaint: Benign Prostatic Hypertrophy    Subjective         Benign Prostatic Hypertrophy      Lester Keating is a 62 y.o. male presents to [facility] to be seen for f/u BPH.    At last visit we recommended the patient to continue on tamsulosin 0.4 mg daily.    He was to follow-up with Dr. Chowdhury however patient does not wish to go to [place] and was lost to follow-up until recently.    Patient had PSA level checked on 3/27/2023 and was found to be 0.724.    He states his stream has improved.     He states frequency every 2 hours.     He states urge incontinence.    He states he is no longer straining to void.     His main issue is the leakage.     He reports that he is with no change in leakage with tamsulosin .     Previous:     He states that for about a year he has been with issues with urinary urgency and frequency with leakage.     He is T2 dm with good glycemic control.     Nocturia x 3-4     He will have a strong urge to go and has to really hold his urine to prevent leakage.     He has had episodes of leakage at least once a day.    He voids every 2 hours during the day.     He feels like he has to strain to empty.    Urinary stream is weak.     He also c/o ED and states sildenafil does not work to give him an erection.     He has seen [facility] for peyronie disease and urgency with frequency.     He states that he has tried tadalafil for lower urinary tract symptoms as well as ED and it did not help to alleviate either set of symptoms.    He states he has not had PSA level checked.     Objective     Past Medical History:   Diagnosis Date   • Diabetes mellitus (HCC)    • Erectile dysfunction    • Hypertension    • Urinary urgency        Past Surgical History:   Procedure Laterality Date   • APPENDECTOMY           Current Outpatient Medications:   •  amLODIPine (NORVASC) 5 MG tablet, Take 2 tablets by mouth Daily for 30 days., Disp: 60 tablet, Rfl: 0  •  aspirin 81  "MG chewable tablet, Chew & swallow 1 tablet Daily for 30 days., Disp: 30 tablet, Rfl: 0  •  atorvastatin (LIPITOR) 80 MG tablet, Take 1 tablet by mouth Daily., Disp: 30 tablet, Rfl: 1  •  Cyanocobalamin (B-12) 500 MCG sublingual tablet, Place 1,000 m under the tongue Daily., Disp: 60 tablet, Rfl: 0  •  Dulaglutide (Trulicity) 0.75 MG/0.5ML solution pen-injector, Inject 0.75 mg under the skin into the appropriate area as directed Every 7 (Seven) Days., Disp: 2 mL, Rfl: 1  •  empagliflozin (Jardiance) 25 MG tablet tablet, Take 1 tablet by mouth Daily., Disp: 30 tablet, Rfl: 1  •  lisinopril (PRINIVIL,ZESTRIL) 40 MG tablet, Take 1 tablet by mouth Daily., Disp: 30 tablet, Rfl: 1  •  metFORMIN (GLUCOPHAGE) 1000 MG tablet, Take 1 tablet by mouth Daily With Breakfast & Dinner., Disp: 60 tablet, Rfl: 1  •  metoprolol succinate XL (TOPROL-XL) 25 MG 24 hr tablet, Take 1 tablet by mouth Daily for 30 days., Disp: 30 tablet, Rfl: 0  •  nicotine (NICODERM CQ) 21 MG/24HR patch, Place 1 patch on the skin as directed by provider Daily., Disp: 14 patch, Rfl: 1  •  tamsulosin (FLOMAX) 0.4 MG capsule 24 hr capsule, Take 1 capsule by mouth Daily., Disp: 30 capsule, Rfl: 1  •  ticagrelor (Brilinta) 60 MG tablet tablet, Take 1 tablet by mouth 2 (Two) Times a Day., Disp: 60 tablet, Rfl: 1    No Known Allergies     History reviewed. No pertinent family history.    Social History     Socioeconomic History   • Marital status:    Tobacco Use   • Smoking status: Former     Packs/day: 1.00     Years: 40.00     Pack years: 40.00     Types: Cigarettes     Start date: 1982     Quit date: 2022     Years since quittin.7   • Smokeless tobacco: Never   Vaping Use   • Vaping Use: Never used   Substance and Sexual Activity   • Alcohol use: Yes     Comment: once a month maybe   • Drug use: Never   • Sexual activity: Defer       Vital Signs:   Resp 15   Ht 190.5 cm (75\")   Wt 118 kg (259 lb 6.4 oz)   BMI 32.42 kg/m²      Physical " Exam     Result Review :   The following data was reviewed by: HUE Robles on 03/29/2023:  Results for orders placed or performed in visit on 03/29/23   Bladder Scan   Result Value Ref Range    Urine Volume 56    POC Urinalysis Dipstick, Automated    Specimen: Urine   Result Value Ref Range    Color Yellow Yellow, Straw, Dark Yellow, Hetal    Clarity, UA Clear Clear    Specific Gravity  1.025 1.005 - 1.030    pH, Urine 5.5 5.0 - 8.0    Leukocytes Negative Negative    Nitrite, UA Negative Negative    Protein,  mg/dL (A) Negative mg/dL    Glucose, UA >=1000 mg/dL (3+) (A) Negative mg/dL    Ketones, UA Negative Negative    Urobilinogen, UA 0.2 E.U./dL Normal, 0.2 E.U./dL    Bilirubin Negative Negative    Blood, UA Trace (A) Negative    Lot Number [id number]     Expiration Date 4/2,024       PSA    PSA 3/27/23   PSA 0.724          Bladder Scan interpretation 03/29/2023    Estimation of residual urine via ZhaogangI 3000 Verathon Bladder Scan  MA/nurse performing: Antwan GARCIA RN   Residual Urine:56 ml  Indication: Urinary urgency   Position: Supine  Examination: Incremental scanning of the suprapubic area using 2.0 MHz transducer using copious amounts of acoustic gel.   Findings: An anechoic area was demonstrated which represented the bladder, with measurement of residual urine as noted. I inspected this myself. In that the residual urine was insignificant, refer to plan for treatment and plan necessary at this time.           Procedures        Assessment and Plan    Diagnoses and all orders for this visit:    1. Urinary urgency (Primary)  -     POC Urinalysis Dipstick, Automated  -     Bladder Scan     we will increase his tamsulosin to 0.8mg q day and have him f/u in 8 weeks or sooner if needed.     May consider UDS or cysto if no improvement in symptoms.       I spent 20 minutes caring for Lester on this date of service. This time includes time spent by me in the following activities:reviewing tests, obtaining  and/or reviewing a separately obtained history, performing a medically appropriate examination and/or evaluation , counseling and educating the patient/family/caregiver, ordering medications, tests, or procedures, and documenting information in the medical record  Follow Up   Return in about 8 weeks (around 5/24/2023) for f/u bph with [facility] .  Patient was given instructions and counseling regarding his condition or for health maintenance advice. Please see specific information pulled into the AVS if appropriate.         This document has been electronically signed by HUE Robles  March 29, 2023 14:12 EDT

## 2023-04-18 ENCOUNTER — OFFICE VISIT (OUTPATIENT)
Dept: CARDIOLOGY | Facility: CLINIC | Age: 63
End: 2023-04-18
Payer: COMMERCIAL

## 2023-04-18 ENCOUNTER — TRANSCRIBE ORDERS (OUTPATIENT)
Dept: PHYSICAL THERAPY | Facility: CLINIC | Age: 63
End: 2023-04-18
Payer: COMMERCIAL

## 2023-04-18 VITALS
DIASTOLIC BLOOD PRESSURE: 102 MMHG | BODY MASS INDEX: 30.36 KG/M2 | SYSTOLIC BLOOD PRESSURE: 177 MMHG | HEART RATE: 76 BPM | WEIGHT: 244.2 LBS | HEIGHT: 75 IN

## 2023-04-18 DIAGNOSIS — I50.23 ACUTE ON CHRONIC HFREF (HEART FAILURE WITH REDUCED EJECTION FRACTION): ICD-10-CM

## 2023-04-18 DIAGNOSIS — I63.9 CEREBROVASCULAR ACCIDENT (CVA), UNSPECIFIED MECHANISM: ICD-10-CM

## 2023-04-18 DIAGNOSIS — I10 PRIMARY HYPERTENSION: Primary | ICD-10-CM

## 2023-04-18 DIAGNOSIS — R26.81 UNSTEADY GAIT: Primary | ICD-10-CM

## 2023-04-18 DIAGNOSIS — R07.89 CHEST PAIN, ATYPICAL: ICD-10-CM

## 2023-04-18 RX ORDER — SACUBITRIL AND VALSARTAN 24; 26 MG/1; MG/1
1 TABLET, FILM COATED ORAL 2 TIMES DAILY
Qty: 28 TABLET | Refills: 0 | COMMUNITY
Start: 2023-04-18

## 2023-04-18 RX ORDER — SACUBITRIL AND VALSARTAN 24; 26 MG/1; MG/1
1 TABLET, FILM COATED ORAL 2 TIMES DAILY
Qty: 60 TABLET | Refills: 0 | Status: SHIPPED | OUTPATIENT
Start: 2023-04-18 | End: 2023-04-18 | Stop reason: SDUPTHER

## 2023-04-18 RX ORDER — DAPAGLIFLOZIN 10 MG/1
TABLET, FILM COATED ORAL
COMMUNITY
Start: 2023-04-06 | End: 2023-04-18 | Stop reason: SDUPTHER

## 2023-04-18 RX ORDER — DAPAGLIFLOZIN 10 MG/1
10 TABLET, FILM COATED ORAL DAILY
Qty: 35 TABLET | Refills: 0 | COMMUNITY
Start: 2023-04-18

## 2023-04-18 RX ORDER — VALSARTAN 160 MG/1
TABLET ORAL
COMMUNITY
Start: 2023-04-06 | End: 2023-04-18

## 2023-04-18 NOTE — PROGRESS NOTES
Chief Complaint  Follow-up (Hospital Follow up [facility] ) and Hypertension    Subjective        History of Present Illness  Lester Keating presents to [facility] for follow up.  Patient is a 63-year-old  male with a past medical history significant for diabetes, hypertension, multiple TIAs and stroke most recently admitted to the hospital in 2023 with an acute stroke.  Cardiology was consulted during this admission after his echo came back with abnormalities including diminished ejection fraction.  Today he presents with some residual weakness since hospital discharge and is dependent on a cane for ambulation.  He reports fatigue and occasional chest pain that occurs approximately once a week and feels like a tightness in his chest.  He is compliant with his medications.  He denies any dyspnea, orthopnea, edema, syncope.      Past Medical History:   Diagnosis Date   • Diabetes mellitus    • Erectile dysfunction    • Hypertension    • Urinary urgency        ALLERGY  No Known Allergies     Past Surgical History:   Procedure Laterality Date   • APPENDECTOMY          Social History     Socioeconomic History   • Marital status:    Tobacco Use   • Smoking status: Former     Packs/day: 1.00     Years: 40.00     Pack years: 40.00     Types: Cigarettes     Start date: 1982     Quit date: 2022     Years since quittin.8   • Smokeless tobacco: Never   Vaping Use   • Vaping Use: Never used   Substance and Sexual Activity   • Alcohol use: Yes     Comment: once a month maybe   • Drug use: Never   • Sexual activity: Defer       History reviewed. No pertinent family history.     Current Outpatient Medications on File Prior to Visit   Medication Sig   • amLODIPine (NORVASC) 5 MG tablet Take 2 tablets by mouth Daily for 30 days. (Patient taking differently: Take  by mouth Daily.)   • aspirin 81 MG chewable tablet Chew & swallow 1 tablet Daily for 30 days.   • atorvastatin  "(LIPITOR) 80 MG tablet Take 1 tablet by mouth Daily.   • Cyanocobalamin (B-12) 500 MCG sublingual tablet Place 1,000 m under the tongue Daily.   • Farxiga 10 MG tablet    • metFORMIN (GLUCOPHAGE) 1000 MG tablet Take 1 tablet by mouth Daily With Breakfast & Dinner.   • metoprolol succinate XL (TOPROL-XL) 25 MG 24 hr tablet Take 1 tablet by mouth Daily for 30 days.   • Semaglutide,0.25 or 0.5MG/DOS, (OZEMPIC) 2 MG/3ML solution pen-injector Inject 0.25 mg under the skin into the appropriate area as directed 1 (One) Time Per Week.   • tamsulosin (FLOMAX) 0.4 MG capsule 24 hr capsule Take 2 capsules by mouth Daily.   • ticagrelor (Brilinta) 60 MG tablet tablet Take 1 tablet by mouth 2 (Two) Times a Day.   • [DISCONTINUED] valsartan (DIOVAN) 160 MG tablet    • [DISCONTINUED] Dulaglutide (Trulicity) 0.75 MG/0.5ML solution pen-injector Inject 0.75 mg under the skin into the appropriate area as directed Every 7 (Seven) Days. (Patient not taking: Reported on 4/18/2023)   • [DISCONTINUED] empagliflozin (Jardiance) 25 MG tablet tablet Take 1 tablet by mouth Daily. (Patient not taking: Reported on 4/18/2023)   • [DISCONTINUED] lisinopril (PRINIVIL,ZESTRIL) 40 MG tablet Take 1 tablet by mouth Daily. (Patient not taking: Reported on 4/18/2023)   • [DISCONTINUED] nicotine (NICODERM CQ) 21 MG/24HR patch Place 1 patch on the skin as directed by provider Daily. (Patient not taking: Reported on 4/18/2023)     No current facility-administered medications on file prior to visit.       Objective   Vitals:    04/18/23 1315 04/18/23 1322   BP: 172/96 (!) 177/102   Pulse: 63 76   Weight: 111 kg (244 lb 3.2 oz)    Height: 190.5 cm (75\")        Physical Exam  Constitutional:       General: He is awake. He is not in acute distress.     Appearance: Normal appearance.      Comments: He is dependent on a cane for ambulation.   HENT:      Head: Normocephalic.      Nose: Nose normal. No congestion.   Eyes:      Extraocular Movements: Extraocular " movements intact.      Conjunctiva/sclera: Conjunctivae normal.      Pupils: Pupils are equal, round, and reactive to light.   Neck:      Thyroid: No thyromegaly.      Vascular: No JVD.   Cardiovascular:      Rate and Rhythm: Normal rate and regular rhythm.      Chest Wall: PMI is not displaced.      Pulses: Normal pulses.      Heart sounds: Normal heart sounds, S1 normal and S2 normal. No murmur heard.    No friction rub. No gallop. No S3 or S4 sounds.   Pulmonary:      Effort: Pulmonary effort is normal.      Breath sounds: Normal breath sounds. No wheezing, rhonchi or rales.   Abdominal:      General: Bowel sounds are normal.      Palpations: Abdomen is soft.      Tenderness: There is no abdominal tenderness.   Musculoskeletal:      Cervical back: No tenderness.      Right lower leg: No edema.      Left lower leg: No edema.   Lymphadenopathy:      Cervical: No cervical adenopathy.   Skin:     General: Skin is warm and dry.      Capillary Refill: Capillary refill takes less than 2 seconds.      Coloration: Skin is not cyanotic.      Findings: No petechiae or rash.      Nails: There is no clubbing.   Neurological:      Mental Status: He is alert. Mental status is at baseline.      Motor: Weakness present.      Gait: Gait abnormal.   Psychiatric:         Mood and Affect: Mood normal.         Behavior: Behavior normal. Behavior is cooperative.         Thought Content: Thought content normal.         Judgment: Judgment normal.           Result Review     The following data was reviewed by HUE Le on 04/18/23.    CMP        3/18/2023    04:23 3/19/2023    04:54 3/20/2023    04:27   CMP   Glucose 111   110   108     BUN 19 19   19     Creatinine 0.93   0.81   0.97     EGFR 92.8   99.7   88.3     Sodium 138   140   140     Potassium 3.5   3.8   4.0     Chloride 105   107   104     Calcium 9.2   8.8   9.1     Total Protein  6.2   6.2     Albumin  3.7   3.9     Total Bilirubin  0.4   0.5     Alkaline  Phosphatase  122   119     AST (SGOT)  14   16     ALT (SGPT)  18   19     BUN/Creatinine Ratio 20.4   23.5   19.6     Anion Gap 9.3   10.5   8.8       CBC w/diff        3/18/2023    04:23 3/19/2023    04:55 3/20/2023    04:27   CBC w/Diff   WBC 5.32   5.31   5.72     RBC 4.88   4.94   5.14     Hemoglobin 14.3   14.4   14.9     Hematocrit 41.1   41.3   43.8     MCV 84.2   83.6   85.2     MCH 29.3   29.1   29.0     MCHC 34.8   34.9   34.0     RDW 13.5   13.6   13.5     Platelets 178   167   174     Neutrophil Rel % 53.7   57.5   50.0     Immature Granulocyte Rel % 0.4   0.4   0.5     Lymphocyte Rel % 27.6   25.0   29.9     Monocyte Rel % 13.7   12.4   14.7     Eosinophil Rel % 3.8   4.1   4.4     Basophil Rel % 0.8   0.6   0.5        Lipid Panel        3/18/2023    04:23   Lipid Panel   Total Cholesterol 104     Triglycerides 104     HDL Cholesterol 31     VLDL Cholesterol 20     LDL Cholesterol  53     LDL/HDL Ratio 1.68         Results for orders placed during the hospital encounter of 03/17/23    Adult Transthoracic Echo Complete W/ Cont if Necessary Per Protocol (With Agitated Saline)    Interpretation Summary  •  Left ventricular ejection fraction appears to be 41 - 45%.  •  Left ventricular wall thickness is consistent with mild concentric hypertrophy.  •  Left ventricular diastolic function is consistent with (grade I) impaired relaxation.  •  Moderate dilation of the aortic root is present.    There were no apparent intracardiac masses, vegetations or thrombi.             Assessment & Plan  Diagnoses and all orders for this visit:    1. Primary hypertension (Primary)    2. Cerebrovascular accident (CVA), unspecified mechanism    3. Acute on chronic HFrEF (heart failure with reduced ejection fraction)    4. Chest pain, atypical  -     Stress Test With Myocardial Perfusion One Day; Future    Other orders  -     sacubitril-valsartan (Entresto) 24-26 MG tablet; Take 1 tablet by mouth 2 (Two) Times a Day.   Dispense: 60 tablet; Refill: 0      1.  His blood pressure is elevated significantly in the office today and by recent home blood pressure readings.  I am going to stop his Diovan and start him on Entresto in light of his recent echocardiogram with mildly reduced ejection fraction of 45 to 50%.  He will take Entresto 24-26 mg twice daily and monitor his blood pressure and return a blood pressure log to my office for review.  At that time I will consider increasing Entresto.  Continue amlodipine.  2.  Status post recent acute CVA.  Continue follow-up with neurology.  Continue physical therapy.  Continue Brilinta 60 mg twice daily.  3.  Newly diagnosed acute on chronic heart failure with reduced ejection fraction 45 to 50% by most recent echocardiogram.  He will be started on Entresto as detailed above.  Continue beta-blocker, Farxiga.  4.  His chest pain is quite atypical.  It occurs sporadically and is self-limiting.  He is not able to walk on a treadmill due to weakness.  He will be scheduled for a myocardial perfusion scan to assess for ischemic changes.        Follow Up   Return in about 3 months (around 7/18/2023).    Patient was given instructions and counseling regarding his condition or for health maintenance advice. Please see specific information pulled into the AVS if appropriate.     Yanet Yo, HUE  04/18/23  13:19 EDT    Dictated Utilizing Dragon Dictation

## 2023-04-19 ENCOUNTER — TREATMENT (OUTPATIENT)
Dept: PHYSICAL THERAPY | Facility: CLINIC | Age: 63
End: 2023-04-19
Payer: COMMERCIAL

## 2023-04-19 DIAGNOSIS — I63.9 CEREBROVASCULAR ACCIDENT (CVA), UNSPECIFIED MECHANISM: ICD-10-CM

## 2023-04-19 DIAGNOSIS — Z74.09 DECREASED STRENGTH, ENDURANCE, AND MOBILITY: ICD-10-CM

## 2023-04-19 DIAGNOSIS — R26.9 GAIT DISTURBANCE: Primary | ICD-10-CM

## 2023-04-19 DIAGNOSIS — R26.89 BALANCE PROBLEM: ICD-10-CM

## 2023-04-19 DIAGNOSIS — R68.89 DECREASED STRENGTH, ENDURANCE, AND MOBILITY: ICD-10-CM

## 2023-04-19 DIAGNOSIS — R53.1 DECREASED STRENGTH, ENDURANCE, AND MOBILITY: ICD-10-CM

## 2023-04-19 NOTE — PROGRESS NOTES
Physical Therapy Initial Evaluation and Plan of Care      Patient: Lester Keating   : 1960  Diagnosis/ICD-10 Code:  Gait disturbance [R26.9]  Referring practitioner: HUE Louis  Date of Initial Visit: 2023  Today's Date: 2023  Patient seen for 1 sessions         Visit Diagnoses:    ICD-10-CM ICD-9-CM   1. Gait disturbance  R26.9 781.2   2. Cerebrovascular accident (CVA), unspecified mechanism  I63.9 434.91   3. Balance problem  R26.89 781.99   4. Decreased strength, endurance, and mobility  R53.1 780.79    Z74.09 780.99    R68.89          Subjective Evaluation    History of Present Illness  Mechanism of injury: 3/20/23 - [facility]on [facility].  He said that this wasn't his first TIA based on his scan he said.  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  He was having trouble with balance and they took him to the ER.      His L side was affected.  He has had slight paralysis in the face (he notes it seems to be clearing up).  He is still having trouble with walking, standing, balance.  He is using a cane.  He notes he gets dizzy now, cane has helped him with balance.  He is still having difficulty with the L hand, gripping, tingling.     He is supposed to return to work on May 1, 2023.      He went to cardiology yesterday, switched his medication.   He is going back to neurology today for a follow up.    Pt states has been trying to get PCP office to send an order for physical therapy for three weeks, but they never sent it.  He and his wife both spoke to the office, was told they would send it, but never did.  He went and got the order and walked in yesterday to the PT clinic to get scheduled for therapy today.      Hobbies- working on cars, hasn't been able to do that due to low stamina/energy           Pain  Aggravating factors: stairs, standing and ambulation    Social Support  Lives in:  one-story house  Lives with: spouse    Hand dominance: right    Patient Goals  Patient goals for therapy: improved balance, increased motion, increased strength, independence with ADLs/IADLs, return to sport/leisure activities and return to work             Objective          Functional Assessment     Comments  5x STS: 30.06 seconds, no HRA or AD    TU.73 seconds, without AD    Posture: forwards flexion, lateral flexion to the left    Gait:  Antalgic gait, shuffling of the L foot, with walking the LUE will draw up into elbow and wrist flexion    Sensation: full intact to light touch and deep pressure on the LUE and LLE, the L hand is cold     General Comments     Hip Comments   MMT Hip:  Flexion: 4+/5 R, 4-/5 L  Abduction: 4+/R, 4/5 L    MMT Knee:  Flexion: 4+/5 R, 4/5 L  Extension: 4+/R, 4-/5 L    MMT Ankle:  DF 5/5 R, 3+/5 L  PF: 5/R, 4/5 L         Assessment & Plan     Assessment  Impairments: abnormal gait, abnormal muscle firing, abnormal or restricted ROM, activity intolerance, impaired balance, impaired physical strength, lacks appropriate home exercise program, safety issue and weight-bearing intolerance    Assessment details: Pt presents with limitations, noted below, that impede his ability to have full balance, gait deficits, strength deficits, job duties, walking long distances, stair navigation, ADLs. The skills of a therapist will be required to safely and effectively implement the following treatment plan to restore maximal level of function.      Goals  Plan Goals: 1. The patient has difficulty with sit to stands.   LTG 1: 12 weeks: The patient will perform the 5 Times Sit to Stand Test in 12 seconds or less to improve ability to stand from toilet and bed at home.    STATUS: New   STG 1a: 4 weeks:  The patient will perform the 5 Times Sit to Stand Test in 18 seconds or less to improve ability to stand from toilet and bed at home.    STATUS: New    2. Mobility: Walking/Moving Around Functional  Limitation      LTG 2: 12 weeks:  The patient will demonstrate decreased limitation by achieving a score of 15 seconds or better on the Timed Up and Go Test (no AD) to improve transitions or making turns while ambulating.     STATUS:  New    STG 2a: 4 weeks:  The patient will demonstrate limitation by achieving a score of 20 seconds or better on the Timed Up and Go Test (no AD)    STATUS:  New     3. The patient demonstrates weakness of the left lower extremity.     LTG 3: 12 weeks:  The patient will demonstrate 4+/5 hip strength, 4+/5 knee strength, and 4+/5 ankle strength in order to improve balance and safety with ambulation.     STATUS:  New    STG 3a: 4 weeks:  The patient will demonstrate 4/5 hip strength, 4/5 knee strength, and 4/5 ankle strength in order to improve balance and safety with ambulation.     STATUS:  New     4. The patient has decreased ability to ambulate    LTG 4: 12 weeks:  The patient will ambulate with SBA without any AD for 500 feet without loss of balance in order to safely ambulate in the community.     STATUS: New    STG 4a: 4 weeks: The patient will ambulate with SBA assistance with a SPC for 1000 feet.     STATUS:  New      TREATMENT: Therapeutic exercises, neuromuscular re-education, gait training, manual therapy, aquatic therapy, home exercise instruction, and modalities as needed for pain to include:  electrical stimulation.      Plan  Therapy options: will be seen for skilled therapy services  Planned modality interventions: cryotherapy, TENS, thermotherapy (hydrocollator packs), electrical stimulation/Russian stimulation, dry needling and ultrasound  Planned therapy interventions: abdominal trunk stabilization, ADL retraining, balance/weight-bearing training, body mechanics training, fine motor coordination training, flexibility, functional ROM exercises, gait training, home exercise program, IADL retraining, joint mobilization, manual therapy, motor coordination training,  neuromuscular re-education, soft tissue mobilization, spinal/joint mobilization, strengthening, stretching, therapeutic activities and transfer training  Frequency: 2x week  Duration in weeks: 12  Plan details: Review HEP, update as needed.    Progress with BLE strength, trunk control/postural awareness, balance and gait training, coordination, increased stamina, decreased tightness, improved ROM/flexibility, education as needed.            History # of Personal Factors and/or Comorbidities: MODERATE (1-2)  Examination of Body System(s): # of elements: MODERATE (3)  Clinical Presentation: STABLE   Clinical Decision Making: MODERATE    Timed:  Manual Therapy:         mins  65679;  Therapeutic Exercise:    8     mins  18563;     Neuromuscular Duane:      mins  59355;    Therapeutic Activity:     8     mins  55622;     Gait Training:           mins  40257;     Ultrasound:          mins  33831;    Canalith Repos           ___  mins  84926      Untimed:  Electrical Stimulation:         mins  43542 ( );  Mechanical Traction:         mins  52320;   Dry Needling:                     mins self pay  Low Eval:                           mins  90479;  Medium Eval:                25     mins  54377;   High Eval:                          mins  87228       Timed Treatment:   16   mins   Total Treatment:     41   mins    PT SIGNATURE: Carol Win PT, DPT  [place] License: [id number]  Electronically signed by Carol Win PT, 04/19/23, 7:47 AM EDT      Initial Certification    Certification Period: 4/19/2023 thru 7/17/2023  I certify that the therapy services are furnished while this patient is under my care.  The services outlined above are required by this patient, and will be reviewed every 90 days.     PHYSICIAN: Carlos Townsend APRN  NPI: [id number]            PHYSICIAN PRINT NAME: ______________________________________________      PHYSICIAN SIGNATURE:  ______________________________________________         DATE:________________________________        Please sign and return via fax to [phone].  Thank you, [facility].

## 2023-04-21 ENCOUNTER — PATIENT OUTREACH (OUTPATIENT)
Dept: CASE MANAGEMENT | Facility: OTHER | Age: 63
End: 2023-04-21
Payer: COMMERCIAL

## 2023-04-25 ENCOUNTER — TELEPHONE (OUTPATIENT)
Dept: CARDIOLOGY | Facility: CLINIC | Age: 63
End: 2023-04-25

## 2023-04-27 RX ORDER — SACUBITRIL AND VALSARTAN 24; 26 MG/1; MG/1
1 TABLET, FILM COATED ORAL 2 TIMES DAILY
Qty: 180 TABLET | Refills: 1 | Status: SHIPPED | OUTPATIENT
Start: 2023-04-27

## 2023-05-02 RX ORDER — DAPAGLIFLOZIN 10 MG/1
10 TABLET, FILM COATED ORAL DAILY
Qty: 28 TABLET | Refills: 0 | COMMUNITY
Start: 2023-05-02

## 2023-05-02 RX ORDER — SACUBITRIL AND VALSARTAN 24; 26 MG/1; MG/1
1 TABLET, FILM COATED ORAL 2 TIMES DAILY
Qty: 56 TABLET | Refills: 0 | COMMUNITY
Start: 2023-05-02

## 2023-05-31 ENCOUNTER — TELEPHONE (OUTPATIENT)
Dept: CARDIOLOGY | Facility: CLINIC | Age: 63
End: 2023-05-31
Payer: COMMERCIAL

## 2023-05-31 NOTE — TELEPHONE ENCOUNTER
PT came into office and stated he can not afford Entresto even after the PAP was approved. He ask for a medication change to something cheaper. He would like a call today or ASAP.

## 2023-06-01 RX ORDER — LOSARTAN POTASSIUM 25 MG/1
25 TABLET ORAL DAILY
Qty: 30 TABLET | Refills: 3 | Status: SHIPPED | OUTPATIENT
Start: 2023-06-01

## 2023-06-01 NOTE — TELEPHONE ENCOUNTER
Patient was not able to afford Entresto.  Lavelle was not controlling his blood pressure.  He will be started on losartan 25 mg daily.  I would advise that he keep a blood pressure log and monitor his blood pressure daily for the next week.  If his blood pressure remains elevated I would increase his losartan.  Continue amlodipine.

## 2023-06-06 NOTE — TELEPHONE ENCOUNTER
Spoke with patient and he said he got a coupon from the Pharmacy and is currently taking the Entresto. He will keep a BP log and reach out with any questions. Called and canceled Losartan.

## 2023-08-02 ENCOUNTER — TELEPHONE (OUTPATIENT)
Dept: CARDIOLOGY | Facility: CLINIC | Age: 63
End: 2023-08-02
Payer: COMMERCIAL

## 2023-08-03 RX ORDER — LOSARTAN POTASSIUM 25 MG/1
25 TABLET ORAL DAILY
Qty: 30 TABLET | Refills: 3 | Status: SHIPPED | OUTPATIENT
Start: 2023-08-03

## 2023-08-04 ENCOUNTER — TELEPHONE (OUTPATIENT)
Dept: CARDIOLOGY | Facility: CLINIC | Age: 63
End: 2023-08-04
Payer: COMMERCIAL

## 2023-08-09 NOTE — TELEPHONE ENCOUNTER
I was able to finally get a hold of pt regarding medication, Entresto and the replacement for it.  When he went to get this medication filled, it was very expensive.  Due to the cost, you discontinued it and wanted him to start on Losartan instead.  Since that time, pt was able to obtain a prescription card that made the Entresto affordable.  He wants to know if you still want him on this medication and if so, another prescription for it will need to be sent in to [facility].     Please advise.  Thank you.

## 2023-08-10 RX ORDER — SACUBITRIL AND VALSARTAN 24; 26 MG/1; MG/1
1 TABLET, FILM COATED ORAL 2 TIMES DAILY
Qty: 60 TABLET | Refills: 0 | Status: SHIPPED | OUTPATIENT
Start: 2023-08-10

## 2023-10-18 ENCOUNTER — PATIENT OUTREACH (OUTPATIENT)
Dept: CASE MANAGEMENT | Facility: OTHER | Age: 63
End: 2023-10-18
Payer: COMMERCIAL

## 2023-10-18 NOTE — OUTREACH NOTE
"  Adult Patient Profile  Questions/Answers      Flowsheet Row Most Recent Value   Symptoms/Conditions Managed at Home cardiovascular   Cardiovascular Symptoms/Conditions hypertension   Cardiovascular Management Strategies medication therapy   Cardiovascular Comment Telephone call with patient.  He reports he is having elevated blood pressure.  Patient reports he checked blood pressure today and it was 183/104 and 177/101. Patient reports he is taking his medication. Patient denies headache. Discussed limiting his salt intake.  Patient reports he has an appointment with PCP tomorrow.  Encouraged him to contact the office today and let PCP know what his readings are today for guidance on next steps.  He reports he will.  Encouraged him to go [facility] ED if it continues to rise.          AMBULATORY CASE MANAGEMENT NOTE    Name and Relationship of Patient/Support Person: Lester Keating \"Ramon\" - Self    Patient Outreach    Telephone call with patient.  Discussed blood pressure management.  Patient reports he will contact PCP office today to report his elevated reading today for guidance on next steps.  Reminded him of the 24 hour nurse call line and to reach out to employee case management as needed.        Education Documentation  Unresolved/Worsening Symptoms, taught by Linda Lara RN at 10/18/2023  9:46 AM.  Learner: Patient  Readiness: Acceptance  Method: Explanation  Response: Verbalizes Understanding    Provider Follow-Up, taught by Linda Lara RN at 10/18/2023  9:46 AM.  Learner: Patient  Readiness: Acceptance  Method: Explanation  Response: Verbalizes Understanding    Medication Management, taught by Linda Lara RN at 10/18/2023  9:46 AM.  Learner: Patient  Readiness: Acceptance  Method: Explanation  Response: Verbalizes Understanding    Blood Pressure Monitoring, taught by Linda Lara RN at 10/18/2023  9:46 AM.  Learner: Patient  Readiness: Acceptance  Method: Explanation  Response: Verbalizes " Hemanth MILIAN  Ambulatory Case Management    10/18/2023, 09:46 EDT

## 2023-11-27 ENCOUNTER — TELEPHONE (OUTPATIENT)
Dept: CARDIOLOGY | Facility: CLINIC | Age: 63
End: 2023-11-27
Payer: COMMERCIAL

## 2023-11-27 RX ORDER — SACUBITRIL AND VALSARTAN 24; 26 MG/1; MG/1
1 TABLET, FILM COATED ORAL 2 TIMES DAILY
Qty: 60 TABLET | Refills: 0 | OUTPATIENT
Start: 2023-11-27

## 2023-11-27 NOTE — TELEPHONE ENCOUNTER
Pt lvm on my extension for refills for the following:    Entresto 24-26 mg  Farxiga 10 mg  Brilinta.60 mg    Medication matches last office note of 4/18/2023 which was the last time he was in the office. (you saw him)      You advised a 3 mo f/u but one was never made that I can see.    Please advise, thank you.

## 2023-11-28 RX ORDER — SACUBITRIL AND VALSARTAN 24; 26 MG/1; MG/1
1 TABLET, FILM COATED ORAL 2 TIMES DAILY
Qty: 60 TABLET | Refills: 0 | Status: SHIPPED | OUTPATIENT
Start: 2023-11-28 | End: 2023-12-04

## 2023-11-28 NOTE — TELEPHONE ENCOUNTER
I sent in Entresto and Brilinta for him.  The rest of the medications can be addressed and refilled at his follow-up visit.

## 2023-12-04 ENCOUNTER — OFFICE VISIT (OUTPATIENT)
Dept: CARDIOLOGY | Facility: CLINIC | Age: 63
End: 2023-12-04
Payer: COMMERCIAL

## 2023-12-04 VITALS
DIASTOLIC BLOOD PRESSURE: 79 MMHG | HEART RATE: 87 BPM | HEIGHT: 75 IN | BODY MASS INDEX: 31.48 KG/M2 | WEIGHT: 253.2 LBS | SYSTOLIC BLOOD PRESSURE: 153 MMHG

## 2023-12-04 DIAGNOSIS — R01.1 HEART MURMUR: ICD-10-CM

## 2023-12-04 DIAGNOSIS — E78.2 MIXED HYPERLIPIDEMIA: ICD-10-CM

## 2023-12-04 DIAGNOSIS — I50.22 CHRONIC HFREF (HEART FAILURE WITH REDUCED EJECTION FRACTION): Primary | ICD-10-CM

## 2023-12-04 DIAGNOSIS — I10 PRIMARY HYPERTENSION: ICD-10-CM

## 2023-12-04 PROCEDURE — 99214 OFFICE O/P EST MOD 30 MIN: CPT

## 2023-12-04 RX ORDER — SACUBITRIL AND VALSARTAN 49; 51 MG/1; MG/1
1 TABLET, FILM COATED ORAL 2 TIMES DAILY
Qty: 60 TABLET | Refills: 3 | Status: SHIPPED | OUTPATIENT
Start: 2023-12-04

## 2023-12-04 RX ORDER — ASPIRIN 81 MG/1
81 TABLET, CHEWABLE ORAL DAILY
COMMUNITY

## 2023-12-04 RX ORDER — SACUBITRIL AND VALSARTAN 49; 51 MG/1; MG/1
1 TABLET, FILM COATED ORAL 2 TIMES DAILY
Qty: 56 TABLET | Refills: 0 | Status: SHIPPED | OUTPATIENT
Start: 2023-12-04

## 2023-12-04 NOTE — PROGRESS NOTES
Chief Complaint  Hypertension and Follow-up (F/U on medication refills.  Pt was last seen in the office on 4/18/23.  Pt was to schedule a 3 mo f/u. )    Subjective        History of Present Illness  Lester Keating presents to [facility] for follow up.  Patient is a 63-year-old  male with a past medical history significant for diabetes, hypertension, multiple TIAs and stroke most recently admitted to the hospital in March 2023 with an acute stroke.  Cardiology was consulted during this admission after his echo came back with abnormalities including diminished ejection fraction.   He presents today for routine follow-up.  He is doing well with no complaints.  He denies chest pain or discomfort.  He denies shortness of breath.  He is not physically active but does exert himself at work and denies exertional complaints.  He has occasional episodes of dizziness that usually occur in the morning.  He denies any orthopnea, edema, syncope.    Past Medical History:   Diagnosis Date    Diabetes mellitus     Erectile dysfunction     Hyperlipidemia     Hypertension     Stroke     Urinary urgency        ALLERGY  No Known Allergies     Past Surgical History:   Procedure Laterality Date    APPENDECTOMY          Social History     Socioeconomic History    Marital status:    Tobacco Use    Smoking status: Every Day     Packs/day: 0.50     Years: 40.00     Additional pack years: 0.00     Total pack years: 20.00     Types: Cigarettes     Start date: 1/11/1982    Smokeless tobacco: Never   Vaping Use    Vaping Use: Never used   Substance and Sexual Activity    Alcohol use: Yes     Comment: once a month maybe    Drug use: Never    Sexual activity: Defer       History reviewed. No pertinent family history.     Current Outpatient Medications on File Prior to Visit   Medication Sig    amitriptyline (ELAVIL) 25 MG tablet Take 1-2 tablets by mouth At bedtime As Needed.    amLODIPine (NORVASC) 10 MG  "tablet Take 1 tablet by mouth Daily.    aspirin 81 MG chewable tablet Chew 1 tablet Daily.    atorvastatin (LIPITOR) 80 MG tablet Take 1 tablet by mouth Daily.    Cyanocobalamin (B-12) 500 MCG sublingual tablet Place 1,000 m under the tongue Daily.    dapagliflozin Propanediol (Farxiga) 10 MG tablet Take 1 tablet by mouth Daily.    glucose blood (Pharmacist Choice Autocode) test strip Use to test blood sugar 2 (Two) Times a Day.    metFORMIN (GLUCOPHAGE) 1000 MG tablet Take 1 tablet by mouth twice Daily With Breakfast & Dinner.    Microlet Lancets misc Use to check blood sugar twice a day.    Semaglutide,0.25 or 0.5MG/DOS, (OZEMPIC) 2 MG/3ML solution pen-injector Inject 0.25 mg under the skin into the appropriate area as directed 1 (One) Time Per Week.    tamsulosin (FLOMAX) 0.4 MG capsule 24 hr capsule Take 1 capsule by mouth Daily.    ticagrelor (Brilinta) 60 MG tablet tablet Take 1 tablet by mouth 2 (Two) Times a Day.    [DISCONTINUED] sacubitril-valsartan (Entresto) 24-26 MG tablet Take 1 tablet by mouth 2 (Two) Times a Day.    metoprolol succinate XL (TOPROL-XL) 25 MG 24 hr tablet Take 1 & 1/2 tablets by mouth Daily.    [DISCONTINUED] amLODIPine (NORVASC) 5 MG tablet Take 2 tablets by mouth Daily for 30 days. (Patient taking differently: Take  by mouth Daily.)    [DISCONTINUED] dapagliflozin Propanediol (Farxiga) 10 MG tablet Take 10 mg by mouth Daily. (Patient not taking: Reported on 12/4/2023)    [DISCONTINUED] Farxiga 10 MG tablet Take 10 mg by mouth Daily. Indications: LOT# [id number] EXP 7/31/2025    [DISCONTINUED] metoprolol succinate XL (TOPROL-XL) 25 MG 24 hr tablet Take 1 tablet by mouth Daily for 30 days.     No current facility-administered medications on file prior to visit.       Objective   Vitals:    12/04/23 0822   BP: 153/79   Pulse: 87   Weight: 115 kg (253 lb 3.2 oz)   Height: 190.5 cm (75\")       Physical Exam  Constitutional:       General: He is awake. He is not in acute distress.     " "Appearance: Normal appearance.   HENT:      Head: Normocephalic.      Nose: Nose normal. No congestion.   Eyes:      Extraocular Movements: Extraocular movements intact.      Conjunctiva/sclera: Conjunctivae normal.      Pupils: Pupils are equal, round, and reactive to light.   Neck:      Thyroid: No thyromegaly.      Vascular: No JVD.   Cardiovascular:      Rate and Rhythm: Normal rate and regular rhythm.      Chest Wall: PMI is not displaced.      Pulses: Normal pulses.      Heart sounds: S1 normal and S2 normal. Murmur heard.      No friction rub. No gallop. No S3 or S4 sounds.   Pulmonary:      Effort: Pulmonary effort is normal.      Breath sounds: Normal breath sounds. No wheezing, rhonchi or rales.   Abdominal:      General: Bowel sounds are normal.      Palpations: Abdomen is soft.      Tenderness: There is no abdominal tenderness.   Musculoskeletal:      Cervical back: No tenderness.      Right lower leg: No edema.      Left lower leg: No edema.   Lymphadenopathy:      Cervical: No cervical adenopathy.   Skin:     General: Skin is warm and dry.      Capillary Refill: Capillary refill takes less than 2 seconds.      Coloration: Skin is not cyanotic.      Findings: No petechiae or rash.      Nails: There is no clubbing.   Neurological:      Mental Status: He is alert.   Psychiatric:         Mood and Affect: Mood normal.         Behavior: Behavior is cooperative.           Result Review     The following data was reviewed by HUE Le on 12/04/23.    No results found for: \"PROBNP\"  CMP          3/18/2023    04:23 3/19/2023    04:54 3/20/2023    04:27   CMP   Glucose 111  110  108    BUN 19 19  19    Creatinine 0.93  0.81  0.97    EGFR 92.8  99.7  88.3    Sodium 138  140  140    Potassium 3.5  3.8  4.0    Chloride 105  107  104    Calcium 9.2  8.8  9.1    Total Protein  6.2  6.2    Albumin  3.7  3.9    Total Bilirubin  0.4  0.5    Alkaline Phosphatase  122  119    AST (SGOT)  14  16    ALT " (SGPT)  18  19    BUN/Creatinine Ratio 20.4  23.5  19.6    Anion Gap 9.3  10.5  8.8      CBC w/diff          3/18/2023    04:23 3/19/2023    04:55 3/20/2023    04:27   CBC w/Diff   WBC 5.32  5.31  5.72    RBC 4.88  4.94  5.14    Hemoglobin 14.3  14.4  14.9    Hematocrit 41.1  41.3  43.8    MCV 84.2  83.6  85.2    MCH 29.3  29.1  29.0    MCHC 34.8  34.9  34.0    RDW 13.5  13.6  13.5    Platelets 178  167  174    Neutrophil Rel % 53.7  57.5  50.0    Immature Granulocyte Rel % 0.4  0.4  0.5    Lymphocyte Rel % 27.6  25.0  29.9    Monocyte Rel % 13.7  12.4  14.7    Eosinophil Rel % 3.8  4.1  4.4    Basophil Rel % 0.8  0.6  0.5       Lipid Panel          3/18/2023    04:23   Lipid Panel   Total Cholesterol 104    Triglycerides 104    HDL Cholesterol 31    VLDL Cholesterol 20    LDL Cholesterol  53    LDL/HDL Ratio 1.68        Results for orders placed during the hospital encounter of 03/17/23    Adult Transthoracic Echo Complete W/ Cont if Necessary Per Protocol (With Agitated Saline)    Interpretation Summary    Left ventricular ejection fraction appears to be 41 - 45%.    Left ventricular wall thickness is consistent with mild concentric hypertrophy.    Left ventricular diastolic function is consistent with (grade I) impaired relaxation.    Moderate dilation of the aortic root is present.    There were no apparent intracardiac masses, vegetations or thrombi.           Procedures    Assessment & Plan  Diagnoses and all orders for this visit:    1. Chronic HFrEF (heart failure with reduced ejection fraction) (Primary)  -     Adult Transthoracic Echo Complete w/ Color, Spectral and Contrast if necessary per protocol; Future    2. Heart murmur    3. Primary hypertension    4. Mixed hyperlipidemia    Other orders  -     sacubitril-valsartan (Entresto) 49-51 MG tablet; Take 1 tablet by mouth 2 (Two) Times a Day.  Dispense: 60 tablet; Refill: 3      1.  Heart failure with reduced ejection fraction with most recent  echocardiogram demonstrating an EF of 41 to 45%.  Entresto will be increased to 49-51 mg twice daily.  Continue Farxiga.  Continue metoprolol 37.5 mg daily.  He is euvolemic and well compensated on exam.  He is asymptomatic.  Plan to repeat echocardiogram for assessment of ejection fraction.  2.  Systolic murmur noted on exam.  Echo to be done as discussed above.  3.  Blood pressure is elevated in the office today and he notes elevated blood pressure readings at home.  Entresto dose will be increased as discussed above.  4.  Continue atorvastatin 80 mg daily.  Most recent cholesterol panel demonstrated total cholesterol 104, LDL 53, HDL 31, triglycerides 104.  These are under good control.  Follow Up   Return in about 3 months (around 3/4/2024) for With Dr. Rico, After testing complete.    Patient was given instructions and counseling regarding his condition or for health maintenance advice. Please see specific information pulled into the AVS if appropriate.     Yanet Yo, HUE  12/04/23  08:30 EST    Dictated Utilizing Dragon Dictation

## 2023-12-08 ENCOUNTER — PATIENT OUTREACH (OUTPATIENT)
Dept: CASE MANAGEMENT | Facility: OTHER | Age: 63
End: 2023-12-08
Payer: COMMERCIAL

## 2023-12-08 NOTE — OUTREACH NOTE
"Adult Patient Profile  Questions/Answers      Flowsheet Row Most Recent Value   Symptoms/Conditions Managed at Home cardiovascular   Cardiovascular Symptoms/Conditions hypertension   Cardiovascular Management Strategies medication therapy, routine screening   Cardiovascular Comment Patient reports he is checking blood pressure daily.  No issues getting his medications.            AMBULATORY CASE MANAGEMENT NOTE    Name and Relationship of Patient/Support Person: Lester Keating \"Ramon\" - Self    Patient Outreach    Discussed Hypertension management with patient.  He feels things are starting to stabilize.  No issues getting his medications and checks blood pressure daily.  Encouraged him to reach out if needed. Status changed to monitoring.    Education Documentation  No documentation found.        DENTON MILIAN  Ambulatory Case Management    12/8/2023, 13:04 EST  "

## 2024-01-11 ENCOUNTER — HOSPITAL ENCOUNTER (OUTPATIENT)
Dept: CARDIOLOGY | Facility: HOSPITAL | Age: 64
Discharge: HOME | End: 2024-01-11
Payer: COMMERCIAL

## 2024-01-11 DIAGNOSIS — I50.22 CHRONIC HFREF (HEART FAILURE WITH REDUCED EJECTION FRACTION): ICD-10-CM

## 2024-01-11 PROCEDURE — 93306 TTE W/DOPPLER COMPLETE: CPT

## 2024-01-12 LAB
BH CV ECHO MEAS - AO MAX PG: 18.6 MMHG
BH CV ECHO MEAS - AO MEAN PG: 10 MMHG
BH CV ECHO MEAS - AO ROOT DIAM: 3.3 CM
BH CV ECHO MEAS - AO V2 MAX: 215.8 CM/SEC
BH CV ECHO MEAS - AO V2 VTI: 46.5 CM
BH CV ECHO MEAS - AVA(I,D): 1.76 CM2
BH CV ECHO MEAS - EDV(CUBED): 233.1 ML
BH CV ECHO MEAS - EDV(MOD-SP2): 233 ML
BH CV ECHO MEAS - EDV(MOD-SP4): 212 ML
BH CV ECHO MEAS - EF(MOD-BP): 53.8 %
BH CV ECHO MEAS - EF(MOD-SP2): 54.1 %
BH CV ECHO MEAS - EF(MOD-SP4): 53.5 %
BH CV ECHO MEAS - ESV(CUBED): 81 ML
BH CV ECHO MEAS - ESV(MOD-SP2): 107 ML
BH CV ECHO MEAS - ESV(MOD-SP4): 98.5 ML
BH CV ECHO MEAS - FS: 29.7 %
BH CV ECHO MEAS - IVS/LVPW: 0.85 CM
BH CV ECHO MEAS - IVSD: 1.06 CM
BH CV ECHO MEAS - LAT PEAK E' VEL: 9 CM/SEC
BH CV ECHO MEAS - LV DIASTOLIC VOL/BSA (35-75): 87.3 CM2
BH CV ECHO MEAS - LV MASS(C)D: 308.3 GRAMS
BH CV ECHO MEAS - LV MAX PG: 2.43 MMHG
BH CV ECHO MEAS - LV MEAN PG: 1.47 MMHG
BH CV ECHO MEAS - LV SYSTOLIC VOL/BSA (12-30): 40.6 CM2
BH CV ECHO MEAS - LV V1 MAX: 78 CM/SEC
BH CV ECHO MEAS - LV V1 VTI: 19.6 CM
BH CV ECHO MEAS - LVIDD: 6.2 CM
BH CV ECHO MEAS - LVIDS: 4.3 CM
BH CV ECHO MEAS - LVOT AREA: 4.2 CM2
BH CV ECHO MEAS - LVOT DIAM: 2.31 CM
BH CV ECHO MEAS - LVPWD: 1.24 CM
BH CV ECHO MEAS - MED PEAK E' VEL: 5.5 CM/SEC
BH CV ECHO MEAS - MV A MAX VEL: 97.3 CM/SEC
BH CV ECHO MEAS - MV DEC TIME: 0.34 SEC
BH CV ECHO MEAS - MV E MAX VEL: 66.4 CM/SEC
BH CV ECHO MEAS - MV E/A: 0.68
BH CV ECHO MEAS - PA V2 MAX: 88.2 CM/SEC
BH CV ECHO MEAS - SI(MOD-SP2): 51.9 ML/M2
BH CV ECHO MEAS - SI(MOD-SP4): 46.7 ML/M2
BH CV ECHO MEAS - SV(LVOT): 81.9 ML
BH CV ECHO MEAS - SV(MOD-SP2): 126 ML
BH CV ECHO MEAS - SV(MOD-SP4): 113.5 ML
BH CV ECHO MEAS - TAPSE (>1.6): 2.35 CM
BH CV ECHO MEASUREMENTS AVERAGE E/E' RATIO: 9.16
BH CV XLRA - RV BASE: 4 CM
BH CV XLRA - RV MID: 3.8 CM
BH CV XLRA - TDI S': 14.9 CM/SEC
IVRT: 132 MS
SINUS: 4.3 CM
STJ: 4.1 CM

## 2024-01-15 ENCOUNTER — TELEPHONE (OUTPATIENT)
Dept: CARDIOLOGY | Facility: CLINIC | Age: 64
End: 2024-01-15
Payer: COMMERCIAL

## 2024-01-15 NOTE — TELEPHONE ENCOUNTER
----- Message from HUE Prince sent at 1/15/2024 12:25 PM EST -----  Echo shows heart function has improved to 51-55%. There is grade 1 diastolic dysfunction. There is mild aortic valve stenosis. There is mild dilation of the aortic valve. Tight blood pressure control is recommended.

## 2024-01-15 NOTE — PROGRESS NOTES
Echo shows heart function has improved to 51-55%. There is grade 1 diastolic dysfunction. There is mild aortic valve stenosis. There is mild dilation of the aortic valve. Tight blood pressure control is recommended.

## 2024-01-15 NOTE — TELEPHONE ENCOUNTER
JIMENA patient. Went over results and recommendations. Patient verbalized understanding and appreciation.

## 2024-03-05 RX ORDER — SACUBITRIL AND VALSARTAN 49; 51 MG/1; MG/1
1 TABLET, FILM COATED ORAL 2 TIMES DAILY
Qty: 60 TABLET | Refills: 3 | Status: SHIPPED | OUTPATIENT
Start: 2024-03-05

## 2024-03-21 ENCOUNTER — PATIENT OUTREACH (OUTPATIENT)
Dept: CASE MANAGEMENT | Facility: OTHER | Age: 64
End: 2024-03-21
Payer: COMMERCIAL

## 2024-03-21 NOTE — OUTREACH NOTE
"  Adult Patient Profile  Questions/Answers      Flowsheet Row Most Recent Value   Symptoms/Conditions Managed at Home cardiovascular   Cardiovascular Symptoms/Conditions hypertension   Cardiovascular Comment Telephone call with abigail. Discussed hypertension management.  Patient has a blood pressure cuff and reports he checks it every day or two.  He reports no issues getting his medication.  Discussed important to keep it in good range.            AMBULATORY CASE MANAGEMENT NOTE    Name and Relationship of Patient/Support Person: Lester Keating \"Ramon\" - Self  Self    Telephone call with patient.  Discussed hypertension management.  No issues or concerns noted at this time. Encouraged to reach out to Employee Case Management if needed.    Patient exhibits strong sense of health self-management and adequate support system.     Education Documentation  No documentation found.        DENTON MILIAN  Ambulatory Case Management    3/21/2024, 15:21 EDT  "

## 2024-04-03 ENCOUNTER — HOSPITAL ENCOUNTER (EMERGENCY)
Facility: HOSPITAL | Age: 64
Discharge: LEFT BEFORE DISCHARGE | End: 2024-04-03
Attending: EMERGENCY MEDICINE
Payer: COMMERCIAL

## 2024-04-03 ENCOUNTER — APPOINTMENT (OUTPATIENT)
Dept: GENERAL RADIOLOGY | Facility: HOSPITAL | Age: 64
End: 2024-04-03
Payer: COMMERCIAL

## 2024-04-03 VITALS
HEIGHT: 75 IN | BODY MASS INDEX: 29.69 KG/M2 | OXYGEN SATURATION: 98 % | HEART RATE: 66 BPM | TEMPERATURE: 98.6 F | RESPIRATION RATE: 18 BRPM | DIASTOLIC BLOOD PRESSURE: 64 MMHG | WEIGHT: 238.76 LBS | SYSTOLIC BLOOD PRESSURE: 122 MMHG

## 2024-04-03 DIAGNOSIS — Z87.898 HISTORY OF ATAXIA: Primary | ICD-10-CM

## 2024-04-03 LAB
ALBUMIN SERPL-MCNC: 4.1 G/DL (ref 3.5–5.2)
ALBUMIN/GLOB SERPL: 1.5 G/DL
ALP SERPL-CCNC: 116 U/L (ref 39–117)
ALT SERPL W P-5'-P-CCNC: 14 U/L (ref 1–41)
ANION GAP SERPL CALCULATED.3IONS-SCNC: 13.6 MMOL/L (ref 5–15)
AST SERPL-CCNC: 16 U/L (ref 1–40)
BASOPHILS # BLD AUTO: 0.04 10*3/MM3 (ref 0–0.2)
BASOPHILS NFR BLD AUTO: 0.5 % (ref 0–1.5)
BILIRUB SERPL-MCNC: 0.3 MG/DL (ref 0–1.2)
BUN SERPL-MCNC: 18 MG/DL (ref 8–23)
BUN/CREAT SERPL: 17.5 (ref 7–25)
CALCIUM SPEC-SCNC: 9.1 MG/DL (ref 8.6–10.5)
CHLORIDE SERPL-SCNC: 103 MMOL/L (ref 98–107)
CO2 SERPL-SCNC: 21.4 MMOL/L (ref 22–29)
CREAT SERPL-MCNC: 1.03 MG/DL (ref 0.76–1.27)
DEPRECATED RDW RBC AUTO: 42.4 FL (ref 37–54)
EGFRCR SERPLBLD CKD-EPI 2021: 81.6 ML/MIN/1.73
EOSINOPHIL # BLD AUTO: 0.19 10*3/MM3 (ref 0–0.4)
EOSINOPHIL NFR BLD AUTO: 2.3 % (ref 0.3–6.2)
ERYTHROCYTE [DISTWIDTH] IN BLOOD BY AUTOMATED COUNT: 14.4 % (ref 12.3–15.4)
GLOBULIN UR ELPH-MCNC: 2.7 GM/DL
GLUCOSE SERPL-MCNC: 149 MG/DL (ref 65–99)
HCT VFR BLD AUTO: 35.1 % (ref 37.5–51)
HGB BLD-MCNC: 11.3 G/DL (ref 13–17.7)
HOLD SPECIMEN: NORMAL
HOLD SPECIMEN: NORMAL
IMM GRANULOCYTES # BLD AUTO: 0.03 10*3/MM3 (ref 0–0.05)
IMM GRANULOCYTES NFR BLD AUTO: 0.4 % (ref 0–0.5)
LYMPHOCYTES # BLD AUTO: 1.3 10*3/MM3 (ref 0.7–3.1)
LYMPHOCYTES NFR BLD AUTO: 15.7 % (ref 19.6–45.3)
MAGNESIUM SERPL-MCNC: 1.7 MG/DL (ref 1.6–2.4)
MCH RBC QN AUTO: 26.3 PG (ref 26.6–33)
MCHC RBC AUTO-ENTMCNC: 32.2 G/DL (ref 31.5–35.7)
MCV RBC AUTO: 81.8 FL (ref 79–97)
MONOCYTES # BLD AUTO: 0.53 10*3/MM3 (ref 0.1–0.9)
MONOCYTES NFR BLD AUTO: 6.4 % (ref 5–12)
NEUTROPHILS NFR BLD AUTO: 6.21 10*3/MM3 (ref 1.7–7)
NEUTROPHILS NFR BLD AUTO: 74.7 % (ref 42.7–76)
NRBC BLD AUTO-RTO: 0 /100 WBC (ref 0–0.2)
PLATELET # BLD AUTO: 259 10*3/MM3 (ref 140–450)
PMV BLD AUTO: 9.9 FL (ref 6–12)
POTASSIUM SERPL-SCNC: 4.3 MMOL/L (ref 3.5–5.2)
PROT SERPL-MCNC: 6.8 G/DL (ref 6–8.5)
RBC # BLD AUTO: 4.29 10*6/MM3 (ref 4.14–5.8)
SODIUM SERPL-SCNC: 138 MMOL/L (ref 136–145)
TROPONIN T SERPL HS-MCNC: 20 NG/L
WBC NRBC COR # BLD AUTO: 8.3 10*3/MM3 (ref 3.4–10.8)
WHOLE BLOOD HOLD COAG: NORMAL
WHOLE BLOOD HOLD SPECIMEN: NORMAL

## 2024-04-03 PROCEDURE — 80053 COMPREHEN METABOLIC PANEL: CPT | Performed by: EMERGENCY MEDICINE

## 2024-04-03 PROCEDURE — 84484 ASSAY OF TROPONIN QUANT: CPT | Performed by: EMERGENCY MEDICINE

## 2024-04-03 PROCEDURE — 83735 ASSAY OF MAGNESIUM: CPT | Performed by: EMERGENCY MEDICINE

## 2024-04-03 PROCEDURE — 85025 COMPLETE CBC W/AUTO DIFF WBC: CPT | Performed by: EMERGENCY MEDICINE

## 2024-04-03 PROCEDURE — 99283 EMERGENCY DEPT VISIT LOW MDM: CPT

## 2024-04-03 PROCEDURE — 36415 COLL VENOUS BLD VENIPUNCTURE: CPT

## 2024-04-03 RX ORDER — SODIUM CHLORIDE 0.9 % (FLUSH) 0.9 %
10 SYRINGE (ML) INJECTION AS NEEDED
Status: DISCONTINUED | OUTPATIENT
Start: 2024-04-03 | End: 2024-04-03 | Stop reason: HOSPADM

## 2024-04-03 NOTE — ED PROVIDER NOTES
Time: 10:45 AM EDT  Date of encounter:  4/3/2024  Independent Historian/Clinical History and Information was obtained by:   Patient    History is limited by: N/A    Chief Complaint: Gait changes and difficulty with balance.      History of Present Illness:  Patient is a 63 y.o. year old male who presents to the emergency department for evaluation of gait changes and difficulty with balance.  This patient has a prior history of TIAs and a family history of strokes.  He states that over the last 4 days he has been having intermittent gait issues where he is having a feeling of being off balance.  He has had no definite dizziness and he denies any headache, or numbness or weakness which is focal.  The patient has had no chest pain shortness of breath palpitations.    HPI    Patient Care Team  Primary Care Provider: Carlos Townsend APRN    Past Medical History:     No Known Allergies  Past Medical History:   Diagnosis Date    Diabetes mellitus     Erectile dysfunction     Hyperlipidemia     Hypertension     Stroke     Urinary urgency      Past Surgical History:   Procedure Laterality Date    APPENDECTOMY       No family history on file.    Home Medications:  Prior to Admission medications    Medication Sig Start Date End Date Taking? Authorizing Provider   amitriptyline (ELAVIL) 25 MG tablet Take 1-2 tablets by mouth At bedtime As Needed. 11/27/23      amLODIPine (NORVASC) 10 MG tablet Take 1 tablet by mouth Daily. 11/27/23      aspirin 81 MG chewable tablet Chew 1 tablet Daily.    Provider, MD Narcisa   atorvastatin (LIPITOR) 80 MG tablet Take 1 tablet by mouth Daily. 11/13/23      Cyanocobalamin (B-12) 500 MCG sublingual tablet Place 1,000 m under the tongue Daily. 3/1/23      dapagliflozin Propanediol (Farxiga) 10 MG tablet Take 1 tablet by mouth Daily. 11/27/23      glucose blood (Pharmacist Choice Autocode) test strip Use to test blood sugar 2 (Two) Times a Day. 10/19/23      metFORMIN (GLUCOPHAGE) 1000 MG  tablet Take 1 tablet by mouth twice Daily With Breakfast & Dinner. 11/13/23      metoprolol succinate XL (TOPROL-XL) 25 MG 24 hr tablet Take 1 & 1/2 tablets by mouth Daily. 11/13/23      Microlet Lancets misc Use to check blood sugar twice a day. 11/27/23      sacubitril-valsartan (Entresto) 49-51 MG tablet Take 1 tablet by mouth 2 (Two) Times a Day. 12/4/23   Yanet Yo APRN   sacubitril-valsartan (Entresto) 49-51 MG tablet Take 1 tablet by mouth 2 (Two) Times a Day. 3/5/24   Yanet Yo APRN   Semaglutide,0.25 or 0.5MG/DOS, (OZEMPIC) 2 MG/3ML solution pen-injector Inject 0.25 mg under the skin into the appropriate area as directed 1 (One) Time Per Week.    Provider, MD Narcisa   tamsulosin (FLOMAX) 0.4 MG capsule 24 hr capsule Take 1 capsule by mouth Daily. 11/13/23      ticagrelor (Brilinta) 60 MG tablet tablet Take 1 tablet by mouth 2 (Two) Times a Day. 11/28/23   Yanet Yo APRN        Social History:   Social History     Tobacco Use    Smoking status: Every Day     Current packs/day: 0.50     Average packs/day: 0.5 packs/day for 42.2 years (21.1 ttl pk-yrs)     Types: Cigarettes     Start date: 1/11/1982    Smokeless tobacco: Never   Vaping Use    Vaping status: Never Used   Substance Use Topics    Alcohol use: Yes     Comment: once a month maybe    Drug use: Never         Review of Systems:  Review of Systems   Constitutional:  Negative for chills and fever.   HENT:  Negative for congestion, ear pain and sore throat.    Eyes:  Negative for pain.   Respiratory:  Negative for cough, chest tightness and shortness of breath.    Cardiovascular:  Negative for chest pain.   Gastrointestinal:  Negative for abdominal pain, diarrhea, nausea and vomiting.   Genitourinary:  Negative for flank pain and hematuria.   Musculoskeletal:  Negative for joint swelling.   Skin:  Negative for pallor.   Neurological:  Positive for weakness. Negative for seizures and headaches.         "Difficulty with gait   All other systems reviewed and are negative.       Physical Exam:  /64 (BP Location: Right arm, Patient Position: Sitting)   Pulse 66   Temp 98.6 °F (37 °C) (Oral)   Resp 18   Ht 190.5 cm (75\")   Wt 108 kg (238 lb 12.1 oz)   SpO2 98%   BMI 29.84 kg/m²     Physical Exam  Vitals and nursing note reviewed.   Constitutional:       General: He is not in acute distress.     Appearance: Normal appearance. He is not toxic-appearing.   HENT:      Head: Normocephalic and atraumatic.      Mouth/Throat:      Mouth: Mucous membranes are moist.   Eyes:      General: No scleral icterus.  Cardiovascular:      Rate and Rhythm: Normal rate and regular rhythm.      Pulses: Normal pulses.      Heart sounds: Normal heart sounds.   Pulmonary:      Effort: Pulmonary effort is normal. No respiratory distress.      Breath sounds: Normal breath sounds.   Abdominal:      General: Abdomen is flat.      Palpations: Abdomen is soft.      Tenderness: There is no abdominal tenderness.   Musculoskeletal:         General: Normal range of motion.      Cervical back: Normal range of motion and neck supple.   Skin:     General: Skin is warm and dry.      Capillary Refill: Capillary refill takes less than 2 seconds.   Neurological:      General: No focal deficit present.      Mental Status: He is alert and oriented to person, place, and time. Mental status is at baseline.      Comments: The patient has a normal physical exam and normal neurologic exam his NIH stroke score is 0.   Psychiatric:         Mood and Affect: Mood normal.         Behavior: Behavior normal.         Thought Content: Thought content normal.         Judgment: Judgment normal.                  Procedures:  Procedures      Medical Decision Making:      Comorbidities that affect care:    Prior TIA.    External Notes reviewed:    Previous Admission Note: Admission for TIA.      The following orders were placed and all results were independently " analyzed by me:  Orders Placed This Encounter   Procedures    [place] Draw    Comprehensive Metabolic Panel    Single High Sensitivity Troponin T    Magnesium    CBC Auto Differential    Undress & Gown    Continuous Pulse Oximetry    Vital Signs    CBC & Differential    Green Top (Gel)    Lavender Top    Gold Top - SST    Light Blue Top       Medications Given in the Emergency Department:  Medications - No data to display       ED Course:         Labs:    Lab Results (last 24 hours)       Procedure Component Value Units Date/Time    CBC & Differential [[id number]]  (Abnormal) Collected: 04/03/24 0826    Specimen: Blood Updated: 04/03/24 0829    Narrative:      The following orders were created for panel order CBC & Differential.  Procedure                               Abnormality         Status                     ---------                               -----------         ------                     CBC Auto Differential[[id number]]        Abnormal            Final result                 Please view results for these tests on the individual orders.    Comprehensive Metabolic Panel [[id number]]  (Abnormal) Collected: 04/03/24 0826    Specimen: Blood Updated: 04/03/24 0849     Glucose 149 mg/dL      BUN 18 mg/dL      Creatinine 1.03 mg/dL      Sodium 138 mmol/L      Potassium 4.3 mmol/L      Chloride 103 mmol/L      CO2 21.4 mmol/L      Calcium 9.1 mg/dL      Total Protein 6.8 g/dL      Albumin 4.1 g/dL      ALT (SGPT) 14 U/L      AST (SGOT) 16 U/L      Alkaline Phosphatase 116 U/L      Total Bilirubin 0.3 mg/dL      Globulin 2.7 gm/dL      A/G Ratio 1.5 g/dL      BUN/Creatinine Ratio 17.5     Anion Gap 13.6 mmol/L      eGFR 81.6 mL/min/1.73     Narrative:      GFR Normal >60  Chronic Kidney Disease <60  Kidney Failure <15      Single High Sensitivity Troponin T [[id number]]  (Normal) Collected: 04/03/24 0826    Specimen: Blood Updated: 04/03/24 0849     HS Troponin T 20 ng/L     Narrative:      High Sensitive Troponin T  Reference Range:  <14.0 ng/L- Negative Female for AMI  <22.0 ng/L- Negative Male for AMI  >=14 - Abnormal Female indicating possible myocardial injury.  >=22 - Abnormal Male indicating possible myocardial injury.   Clinicians would have to utilize clinical acumen, EKG, Troponin, and serial changes to determine if it is an Acute Myocardial Infarction or myocardial injury due to an underlying chronic condition.         Magnesium [[id number]]  (Normal) Collected: 04/03/24 0826    Specimen: Blood Updated: 04/03/24 0849     Magnesium 1.7 mg/dL     CBC Auto Differential [[id number]]  (Abnormal) Collected: 04/03/24 0826    Specimen: Blood Updated: 04/03/24 0829     WBC 8.30 10*3/mm3      RBC 4.29 10*6/mm3      Hemoglobin 11.3 g/dL      Hematocrit 35.1 %      MCV 81.8 fL      MCH 26.3 pg      MCHC 32.2 g/dL      RDW 14.4 %      RDW-SD 42.4 fl      MPV 9.9 fL      Platelets 259 10*3/mm3      Neutrophil % 74.7 %      Lymphocyte % 15.7 %      Monocyte % 6.4 %      Eosinophil % 2.3 %      Basophil % 0.5 %      Immature Grans % 0.4 %      Neutrophils, Absolute 6.21 10*3/mm3      Lymphocytes, Absolute 1.30 10*3/mm3      Monocytes, Absolute 0.53 10*3/mm3      Eosinophils, Absolute 0.19 10*3/mm3      Basophils, Absolute 0.04 10*3/mm3      Immature Grans, Absolute 0.03 10*3/mm3      nRBC 0.0 /100 WBC              Imaging:    No Radiology Exams Resulted Within Past 24 Hours      Differential Diagnosis and Discussion:    Weakness: Based on the patient's history, signs, and symptoms, the diffential diagnosis includes but is not limited to meningitis, stroke, sepsis, subarachnoid hemorrhage, intracranial bleeding, encephalitis, acute uti, dehydration, MS, myasthenia gravis, [place], migraine variant, neuromuscular disorders vertigo, electrolyte imbalance, and metabolic disorders.    All labs were reviewed and interpreted by me.    MDM  Number of Diagnoses or Management Options  History of ataxia  Diagnosis management comments: This  "patient is alert and oriented x 3 and competent to make medical decisions.  The patient refused to have some basic test such as EKG and chest x-ray performed.  Furthermore I indicated to the patient that we needed to have a telemetry neurology consult for him and then perform imaging as per their suggestion.  The patient states he does not want any further testing done and states that he does not want to deal with the cost of further \"unnecessary\" testing.  I informed the patient that although some of these tests are costly, they are extremely necessary and I am concerned that he is having persistent TIAs.  I am also concerned that he could be having further stroke and could progress to a catastrophic stroke which could cause death or permanent disability.  The patient adamantly refuses any further testing or admission to the hospital he understands the risk of death and permanent disability and states that he would follow-up with his primary care provider.       Amount and/or Complexity of Data Reviewed  Clinical lab tests: reviewed                 Patient Care Considerations:    MRI: I considered ordering an MRI however the patient refuses any further imaging.      Consultants/Shared Management Plan:    None    Social Determinants of Health:    Patient is independent, reliable, and has access to care.       Disposition and Care Coordination:        I have explained discharge medications and the need for follow up with the patient/caretakers. This was also printed in the discharge instructions. Patient was discharged with the following medications and follow up:      Medication List      No changes were made to your prescriptions during this visit.      Carlos Townsend, APRN  [address] [place] [address] [place] [id number] [phone]    In 1 day         Final diagnoses:   History of ataxia        ED Disposition       ED Disposition   AMA    Condition   --    Comment   --               This medical record created " using voice recognition software.             Hung Barnes, DO  04/03/24 7541

## 2024-04-03 NOTE — DISCHARGE INSTRUCTIONS
Continue to take your medications.  Return for worsening symptoms.  Follow-up with your primary care provider by calling for an appointment

## 2024-04-03 NOTE — Clinical Note
[facility] EMERGENCY ROOM  [address] [place] [address]  Phone: [phone]  Fax: [phone]    Lester Keating was seen and treated in our emergency department on 4/3/2024.  He may return to work on 04/07/2024.         Thank you for choosing [facility].    Hung Barnes, DO

## 2024-04-29 ENCOUNTER — OFFICE VISIT (OUTPATIENT)
Dept: SURGERY | Facility: CLINIC | Age: 64
End: 2024-04-29
Payer: COMMERCIAL

## 2024-04-29 ENCOUNTER — TELEPHONE (OUTPATIENT)
Dept: SURGERY | Facility: CLINIC | Age: 64
End: 2024-04-29

## 2024-04-29 ENCOUNTER — PREP FOR SURGERY (OUTPATIENT)
Dept: OTHER | Facility: HOSPITAL | Age: 64
End: 2024-04-29
Payer: COMMERCIAL

## 2024-04-29 VITALS
SYSTOLIC BLOOD PRESSURE: 132 MMHG | DIASTOLIC BLOOD PRESSURE: 73 MMHG | WEIGHT: 245 LBS | HEART RATE: 65 BPM | BODY MASS INDEX: 30.46 KG/M2 | HEIGHT: 75 IN

## 2024-04-29 DIAGNOSIS — Z80.0 FAMILY HISTORY OF COLON CANCER IN FATHER: ICD-10-CM

## 2024-04-29 DIAGNOSIS — D50.9 IRON DEFICIENCY ANEMIA, UNSPECIFIED IRON DEFICIENCY ANEMIA TYPE: Primary | ICD-10-CM

## 2024-04-29 DIAGNOSIS — Z86.010 HISTORY OF COLONIC POLYPS: ICD-10-CM

## 2024-04-29 PROCEDURE — 99203 OFFICE O/P NEW LOW 30 MIN: CPT | Performed by: NURSE PRACTITIONER

## 2024-04-29 RX ORDER — SODIUM CHLORIDE 0.9 % (FLUSH) 0.9 %
3 SYRINGE (ML) INJECTION EVERY 12 HOURS SCHEDULED
OUTPATIENT
Start: 2024-04-29

## 2024-04-29 RX ORDER — SODIUM CHLORIDE 0.9 % (FLUSH) 0.9 %
10 SYRINGE (ML) INJECTION AS NEEDED
OUTPATIENT
Start: 2024-04-29

## 2024-04-29 RX ORDER — SODIUM CHLORIDE 9 MG/ML
40 INJECTION, SOLUTION INTRAVENOUS AS NEEDED
OUTPATIENT
Start: 2024-04-29

## 2024-04-29 RX ORDER — SODIUM, POTASSIUM,MAG SULFATES 17.5-3.13G
SOLUTION, RECONSTITUTED, ORAL ORAL
Qty: 354 ML | Refills: 0 | Status: SHIPPED | OUTPATIENT
Start: 2024-04-29

## 2024-04-29 NOTE — TELEPHONE ENCOUNTER
Patient: Lester Keating  YOB: 1960      This patient is waiting to have a Colonoscopy and Esophagogastroduodenoscopy which will be performed at [facility] on 5/10/24 by .     Our records indicate this patient is currently taking brillinta . This procedure requires the patient to suspend their Brilinta 5 days prior to surgery.     Please respond to this request noting your recommendations. You may contact our office at [phone] with any questions. I appreciate your prompt response in this matter.     Please return this form to our office as soon as possible to [phone]    ____ I approve my patient to stop taking their Brilinta 5 days days prior to the scheduled procedure.    ____ I do NOT approve my patient to stop taking their Brilinta at this time.    ____ I approve my patient from a cardiac standpoint.    ____ I do NOT approve my patient from a cardiac standpoint at this time.    Approving physician name (please print):     _____________________________________________    Approving physician signature:     ________________________________            Date:________________      Sincerely,    HUE Gordon

## 2024-04-29 NOTE — TELEPHONE ENCOUNTER
Procedure: Colonoscopy and/or EGD    Medication Directive: Brilinta    PMH: CHF, Heart Murmur, HTN, HLD    Last Seen: 12/04/23    ECHO: 01/11/24     Left ventricular ejection fraction appears to be 51 - 55%.    The left ventricular cavity is mild to moderately dilated.    Left ventricular diastolic function is consistent with (grade I) impaired relaxation and age.    Mild aortic valve stenosis is present with max/mean pressure gradient 19/10 mmHg.    Mild dilation of the proximal aorta is present measuring up to 4.3 cm.

## 2024-04-29 NOTE — H&P (VIEW-ONLY)
Chief Complaint: Colonoscopy and EGD (consult)    Subjective      EGD and colonoscopy consultation       History of Present Illness  Lester Keating is a 64 y.o. male presents to [facility] for EGD and colonoscopy consultation.    Patient presents today on referral from Carlos Gonzales for iron deficiency anemia (hgb: 10.8; hct: 34.2).  Patient does admit to fatigue.  Denies shortness of breath.  Patient does report decrease in appetite and weight loss, however he is taking Ozempic.    Patient denies any dysphagia, GERD, melena.  Or epigastric pain.  Denies any pain before or after eating.  Denies any family history of esophageal or stomach cancer.    Patient denies any lower abdominal pain, change in bowel habit, or rectal bleeding.  Reports last colonoscopy was 2 years ago and was normal.  Admits to family history of colon cancer with his father.    Patient denies GABO.  He does take Ozempic.    Patient does take Brilinta and is under the care of Yanet Núñez I will get cardiac clearance prior to the procedure.    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         Objective     Past Medical History:   Diagnosis Date    Diabetes mellitus     Erectile dysfunction     Hyperlipidemia     Hypertension     Stroke     Urinary urgency        Past Surgical History:   Procedure Laterality Date    APPENDECTOMY         Outpatient Medications Marked as Taking for the 4/29/24 encounter (Office Visit) with Lizbeth Rivera APRN   Medication Sig Dispense Refill    amLODIPine (NORVASC) 10 MG tablet Take 1 tablet by mouth Daily. 90 tablet 1    aspirin 81 MG chewable tablet Chew 1 tablet Daily.      atorvastatin (LIPITOR) 80 MG tablet Take 1 tablet by mouth Daily. 90 tablet 0    dapagliflozin Propanediol (Farxiga) 10 MG tablet Take 1 tablet by mouth Daily. 90 tablet 1    glucose blood (Pharmacist Choice Autocode) test strip  "Use to test blood sugar 2 (Two) Times a Day. 100 each 2    metFORMIN (GLUCOPHAGE) 1000 MG tablet Take 1 tablet by mouth twice Daily With Breakfast & Dinner. 180 tablet 1    metoprolol succinate XL (TOPROL-XL) 25 MG 24 hr tablet Take 1 & 1/2 tablets by mouth Daily. 135 tablet 1    Microlet Lancets misc Use to check blood sugar twice a day. 100 each 1    sacubitril-valsartan (Entresto) 49-51 MG tablet Take 1 tablet by mouth 2 (Two) Times a Day. 60 tablet 3    Semaglutide,0.25 or 0.5MG/DOS, (OZEMPIC) 2 MG/3ML solution pen-injector Inject 0.25 mg under the skin into the appropriate area as directed 1 (One) Time Per Week.      tamsulosin (FLOMAX) 0.4 MG capsule 24 hr capsule Take 1 capsule by mouth Daily. 90 capsule 1    ticagrelor (Brilinta) 60 MG tablet tablet Take 1 tablet by mouth 2 (Two) Times a Day. 28 tablet 0       No Known Allergies     History reviewed. No pertinent family history.    Social History     Socioeconomic History    Marital status:    Tobacco Use    Smoking status: Former     Current packs/day: 0.50     Average packs/day: 0.5 packs/day for 42.3 years (21.2 ttl pk-yrs)     Types: Cigarettes     Start date: 1/11/1982    Smokeless tobacco: Never   Vaping Use    Vaping status: Never Used   Substance and Sexual Activity    Alcohol use: Yes     Comment: once a month maybe    Drug use: Never    Sexual activity: Defer       Review of Systems   Constitutional:  Positive for fatigue. Negative for chills and fever.   HENT:  Negative for trouble swallowing.    Gastrointestinal:  Negative for abdominal distention, abdominal pain, anal bleeding, blood in stool, constipation, diarrhea, nausea, rectal pain, vomiting, GERD and indigestion.        Vital Signs:   /73 (BP Location: Right arm)   Pulse 65   Ht 190.5 cm (75\")   Wt 111 kg (245 lb)   BMI 30.62 kg/m²      Physical Exam  Vitals and nursing note reviewed.   Constitutional:       General: He is not in acute distress.     Appearance: Normal " appearance.   HENT:      Head: Normocephalic and atraumatic.   Cardiovascular:      Rate and Rhythm: Normal rate.   Pulmonary:      Effort: Pulmonary effort is normal.      Breath sounds: No stridor.   Abdominal:      Palpations: Abdomen is soft.      Tenderness: There is no abdominal tenderness. There is no guarding or rebound.   Musculoskeletal:         General: No deformity. Normal range of motion.   Skin:     General: Skin is warm and dry.      Coloration: Skin is not jaundiced.   Neurological:      General: No focal deficit present.      Mental Status: He is alert and oriented to person, place, and time.   Psychiatric:         Mood and Affect: Mood normal.         Thought Content: Thought content normal.          Result Review :            []  Laboratory  []  Radiology  []  Pathology  []  Microbiology  []  EKG/Telemetry   []  Cardiology/Vascular   []  Old records  I spent 15 minutes caring for Lester on this date of service. This time includes time spent by me in the following activities: reviewing tests, obtaining and/or reviewing a separately obtained history, performing a medically appropriate examination and/or evaluation, ordering medications, tests, or procedures, and documenting information in the medical record.     Assessment and Plan    Diagnoses and all orders for this visit:    1. Iron deficiency anemia, unspecified iron deficiency anemia type (Primary)    2. Family history of colon cancer in father    3. History of colonic polyps    Other orders  -     sodium-potassium-magnesium sulfates (Suprep Bowel Prep Kit) 17.5-3.13-1.6 GM/177ML solution oral solution; Take as directed.  Instructions given in office.  Dispense: 354 mL; Refill: 0        Follow Up   Return for Schedule EGD and colonoscopy with Dr. Domingo on 5/10/2024 [facility].    Hospital arrival time: 11:30    Possible risks/complications, benefits, and alternatives to surgical or invasive procedures have been explained to  patient and/or legal guardian.    Patient has been evaluated and can tolerate anesthesia and/or sedation. Risks, benefits, and alternatives to anesthesia and sedation have been explained to the patient and/or legal guardian. Patient verbalizes understanding and is willing to proceed with the above plan.     Patient was given instructions and counseling regarding his condition or for health maintenance advice. Please see specific information pulled into the AVS if appropriate.

## 2024-04-29 NOTE — PROGRESS NOTES
Chief Complaint: Colonoscopy and EGD (consult)    Subjective      EGD and colonoscopy consultation       History of Present Illness  Lester Keating is a 64 y.o. male presents to [facility] for EGD and colonoscopy consultation.    Patient presents today on referral from Carlos Gonzales for iron deficiency anemia (hgb: 10.8; hct: 34.2).  Patient does admit to fatigue.  Denies shortness of breath.  Patient does report decrease in appetite and weight loss, however he is taking Ozempic.    Patient denies any dysphagia, GERD, melena.  Or epigastric pain.  Denies any pain before or after eating.  Denies any family history of esophageal or stomach cancer.    Patient denies any lower abdominal pain, change in bowel habit, or rectal bleeding.  Reports last colonoscopy was 2 years ago and was normal.  Admits to family history of colon cancer with his father.    Patient denies GABO.  He does take Ozempic.    Patient does take Brilinta and is under the care of Yanet Núñez I will get cardiac clearance prior to the procedure.    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         Objective     Past Medical History:   Diagnosis Date    Diabetes mellitus     Erectile dysfunction     Hyperlipidemia     Hypertension     Stroke     Urinary urgency        Past Surgical History:   Procedure Laterality Date    APPENDECTOMY         Outpatient Medications Marked as Taking for the 4/29/24 encounter (Office Visit) with Lizbeth Rivera APRN   Medication Sig Dispense Refill    amLODIPine (NORVASC) 10 MG tablet Take 1 tablet by mouth Daily. 90 tablet 1    aspirin 81 MG chewable tablet Chew 1 tablet Daily.      atorvastatin (LIPITOR) 80 MG tablet Take 1 tablet by mouth Daily. 90 tablet 0    dapagliflozin Propanediol (Farxiga) 10 MG tablet Take 1 tablet by mouth Daily. 90 tablet 1    glucose blood (Pharmacist Choice Autocode) test strip  "Use to test blood sugar 2 (Two) Times a Day. 100 each 2    metFORMIN (GLUCOPHAGE) 1000 MG tablet Take 1 tablet by mouth twice Daily With Breakfast & Dinner. 180 tablet 1    metoprolol succinate XL (TOPROL-XL) 25 MG 24 hr tablet Take 1 & 1/2 tablets by mouth Daily. 135 tablet 1    Microlet Lancets misc Use to check blood sugar twice a day. 100 each 1    sacubitril-valsartan (Entresto) 49-51 MG tablet Take 1 tablet by mouth 2 (Two) Times a Day. 60 tablet 3    Semaglutide,0.25 or 0.5MG/DOS, (OZEMPIC) 2 MG/3ML solution pen-injector Inject 0.25 mg under the skin into the appropriate area as directed 1 (One) Time Per Week.      tamsulosin (FLOMAX) 0.4 MG capsule 24 hr capsule Take 1 capsule by mouth Daily. 90 capsule 1    ticagrelor (Brilinta) 60 MG tablet tablet Take 1 tablet by mouth 2 (Two) Times a Day. 28 tablet 0       No Known Allergies     History reviewed. No pertinent family history.    Social History     Socioeconomic History    Marital status:    Tobacco Use    Smoking status: Former     Current packs/day: 0.50     Average packs/day: 0.5 packs/day for 42.3 years (21.2 ttl pk-yrs)     Types: Cigarettes     Start date: 1/11/1982    Smokeless tobacco: Never   Vaping Use    Vaping status: Never Used   Substance and Sexual Activity    Alcohol use: Yes     Comment: once a month maybe    Drug use: Never    Sexual activity: Defer       Review of Systems   Constitutional:  Positive for fatigue. Negative for chills and fever.   HENT:  Negative for trouble swallowing.    Gastrointestinal:  Negative for abdominal distention, abdominal pain, anal bleeding, blood in stool, constipation, diarrhea, nausea, rectal pain, vomiting, GERD and indigestion.        Vital Signs:   /73 (BP Location: Right arm)   Pulse 65   Ht 190.5 cm (75\")   Wt 111 kg (245 lb)   BMI 30.62 kg/m²      Physical Exam  Vitals and nursing note reviewed.   Constitutional:       General: He is not in acute distress.     Appearance: Normal " appearance.   HENT:      Head: Normocephalic and atraumatic.   Cardiovascular:      Rate and Rhythm: Normal rate.   Pulmonary:      Effort: Pulmonary effort is normal.      Breath sounds: No stridor.   Abdominal:      Palpations: Abdomen is soft.      Tenderness: There is no abdominal tenderness. There is no guarding or rebound.   Musculoskeletal:         General: No deformity. Normal range of motion.   Skin:     General: Skin is warm and dry.      Coloration: Skin is not jaundiced.   Neurological:      General: No focal deficit present.      Mental Status: He is alert and oriented to person, place, and time.   Psychiatric:         Mood and Affect: Mood normal.         Thought Content: Thought content normal.          Result Review :            []  Laboratory  []  Radiology  []  Pathology  []  Microbiology  []  EKG/Telemetry   []  Cardiology/Vascular   []  Old records  I spent 15 minutes caring for Lester on this date of service. This time includes time spent by me in the following activities: reviewing tests, obtaining and/or reviewing a separately obtained history, performing a medically appropriate examination and/or evaluation, ordering medications, tests, or procedures, and documenting information in the medical record.     Assessment and Plan    Diagnoses and all orders for this visit:    1. Iron deficiency anemia, unspecified iron deficiency anemia type (Primary)    2. Family history of colon cancer in father    3. History of colonic polyps    Other orders  -     sodium-potassium-magnesium sulfates (Suprep Bowel Prep Kit) 17.5-3.13-1.6 GM/177ML solution oral solution; Take as directed.  Instructions given in office.  Dispense: 354 mL; Refill: 0        Follow Up   Return for Schedule EGD and colonoscopy with Dr. Domingo on 5/10/2024 [facility].    Hospital arrival time: 11:30    Possible risks/complications, benefits, and alternatives to surgical or invasive procedures have been explained to  patient and/or legal guardian.    Patient has been evaluated and can tolerate anesthesia and/or sedation. Risks, benefits, and alternatives to anesthesia and sedation have been explained to the patient and/or legal guardian. Patient verbalizes understanding and is willing to proceed with the above plan.     Patient was given instructions and counseling regarding his condition or for health maintenance advice. Please see specific information pulled into the AVS if appropriate.

## 2024-04-29 NOTE — TELEPHONE ENCOUNTER
JIMENA patient. Patient verbalized he is on Brilinta due to stroke while on Plavix. Patient's PCP prescribes his Brilinta.

## 2024-04-29 NOTE — TELEPHONE ENCOUNTER
The patient has undergone cardiovascular evaluation from a cardiac standpoint only. Patient is low risk and is ok to proceed.  Defer Brilinta directive to PCP.

## 2024-05-02 ENCOUNTER — TELEPHONE (OUTPATIENT)
Dept: SURGERY | Facility: CLINIC | Age: 64
End: 2024-05-02
Payer: COMMERCIAL

## 2024-05-03 NOTE — TELEPHONE ENCOUNTER
Attempted to contact  office to confirm receipt of clearance letter.  I left a message with Dr.Greenwells [facility] medical assistance to please return my call.     Letter needs to be responded to urgently as pt is scheduled for his colonoscopy on 5/10 and will need to hold his brilinta for 5 days.   
Received approved clearance.    Notified pt to stop his brilinta for 5 days prior to colonoscopy and EGD. Pt v/u.   
Spoke to Alexia at  office yesterday at the end of office hours.  She is stating that  says cardiology needs to give to clearance on holding pts brilinta. I explained to her that cardiology is not giving that approval and saying that the pts PCP needs to give the clearance.  Alexia requested I fax the pts clearance letter again.     I have refaxed the clearance letter to  office as they have stated they have not received it.  I did receive confirmation from the first time I faxed the letter on 4/30 and again yesterday.  
URI (upper respiratory infection)

## 2024-05-06 NOTE — PRE-PROCEDURE INSTRUCTIONS
"Instructed on date and arrival time of 1130. Come to entrance \"C\". Must have  over age 18 to drive home.  May have two visitors; however, children under 12 must stay in waiting room.  Discussed clear liquid diet (no red or purple), bowel prep, and NPO.  May take medications as usual except for blood thinners, diabetic medications, and weight loss medications.  Verbalized understanding of instructions given.  Instructed to call for questions or concerns.  Hold Brilinta for 5 days and Ozempic for one week prior to procedure.  "

## 2024-05-09 ENCOUNTER — ANESTHESIA EVENT (OUTPATIENT)
Dept: GASTROENTEROLOGY | Facility: HOSPITAL | Age: 64
End: 2024-05-09
Payer: COMMERCIAL

## 2024-05-10 ENCOUNTER — ANESTHESIA (OUTPATIENT)
Dept: GASTROENTEROLOGY | Facility: HOSPITAL | Age: 64
End: 2024-05-10
Payer: COMMERCIAL

## 2024-05-10 ENCOUNTER — HOSPITAL ENCOUNTER (OUTPATIENT)
Facility: HOSPITAL | Age: 64
Setting detail: HOSPITAL OUTPATIENT SURGERY
Discharge: HOME | End: 2024-05-10
Attending: SURGERY | Admitting: SURGERY
Payer: COMMERCIAL

## 2024-05-10 VITALS
RESPIRATION RATE: 16 BRPM | OXYGEN SATURATION: 98 % | BODY MASS INDEX: 29.76 KG/M2 | HEART RATE: 55 BPM | WEIGHT: 238.1 LBS | SYSTOLIC BLOOD PRESSURE: 147 MMHG | DIASTOLIC BLOOD PRESSURE: 73 MMHG | TEMPERATURE: 97 F

## 2024-05-10 DIAGNOSIS — D50.9 IRON DEFICIENCY ANEMIA, UNSPECIFIED IRON DEFICIENCY ANEMIA TYPE: ICD-10-CM

## 2024-05-10 LAB — GLUCOSE BLDC GLUCOMTR-MCNC: 128 MG/DL (ref 70–99)

## 2024-05-10 PROCEDURE — 88305 TISSUE EXAM BY PATHOLOGIST: CPT | Performed by: SURGERY

## 2024-05-10 PROCEDURE — 82948 REAGENT STRIP/BLOOD GLUCOSE: CPT | Performed by: NURSE ANESTHETIST, CERTIFIED REGISTERED

## 2024-05-10 PROCEDURE — 88342 IMHCHEM/IMCYTCHM 1ST ANTB: CPT | Performed by: SURGERY

## 2024-05-10 PROCEDURE — 25010000002 PROPOFOL 10 MG/ML EMULSION: Performed by: NURSE ANESTHETIST, CERTIFIED REGISTERED

## 2024-05-10 PROCEDURE — 25810000003 LACTATED RINGERS PER 1000 ML: Performed by: NURSE ANESTHETIST, CERTIFIED REGISTERED

## 2024-05-10 RX ORDER — SUCRALFATE 1 G/1
1 TABLET ORAL 3 TIMES DAILY
Qty: 42 TABLET | Refills: 0 | Status: SHIPPED | OUTPATIENT
Start: 2024-05-10 | End: 2024-05-24

## 2024-05-10 RX ORDER — CHOLECALCIFEROL (VITAMIN D3) 125 MCG
5 CAPSULE ORAL NIGHTLY
COMMUNITY

## 2024-05-10 RX ORDER — SODIUM CHLORIDE 9 MG/ML
40 INJECTION, SOLUTION INTRAVENOUS AS NEEDED
Status: DISCONTINUED | OUTPATIENT
Start: 2024-05-10 | End: 2024-05-10 | Stop reason: HOSPADM

## 2024-05-10 RX ORDER — SODIUM CHLORIDE 0.9 % (FLUSH) 0.9 %
10 SYRINGE (ML) INJECTION AS NEEDED
Status: DISCONTINUED | OUTPATIENT
Start: 2024-05-10 | End: 2024-05-10 | Stop reason: HOSPADM

## 2024-05-10 RX ORDER — SODIUM CHLORIDE 0.9 % (FLUSH) 0.9 %
3 SYRINGE (ML) INJECTION EVERY 12 HOURS SCHEDULED
Status: DISCONTINUED | OUTPATIENT
Start: 2024-05-10 | End: 2024-05-10 | Stop reason: HOSPADM

## 2024-05-10 RX ORDER — LIDOCAINE HYDROCHLORIDE 20 MG/ML
INJECTION, SOLUTION EPIDURAL; INFILTRATION; INTRACAUDAL; PERINEURAL AS NEEDED
Status: DISCONTINUED | OUTPATIENT
Start: 2024-05-10 | End: 2024-05-10 | Stop reason: SURG

## 2024-05-10 RX ORDER — SODIUM CHLORIDE, SODIUM LACTATE, POTASSIUM CHLORIDE, CALCIUM CHLORIDE 600; 310; 30; 20 MG/100ML; MG/100ML; MG/100ML; MG/100ML
30 INJECTION, SOLUTION INTRAVENOUS CONTINUOUS
Status: DISCONTINUED | OUTPATIENT
Start: 2024-05-10 | End: 2024-05-10 | Stop reason: HOSPADM

## 2024-05-10 RX ORDER — PROPOFOL 10 MG/ML
VIAL (ML) INTRAVENOUS CONTINUOUS PRN
Status: DISCONTINUED | OUTPATIENT
Start: 2024-05-10 | End: 2024-05-10 | Stop reason: SURG

## 2024-05-10 RX ORDER — PANTOPRAZOLE SODIUM 20 MG/1
20 TABLET, DELAYED RELEASE ORAL DAILY
Qty: 30 TABLET | Refills: 1 | Status: SHIPPED | OUTPATIENT
Start: 2024-05-10 | End: 2025-05-10

## 2024-05-10 RX ORDER — PROPOFOL 10 MG/ML
VIAL (ML) INTRAVENOUS AS NEEDED
Status: DISCONTINUED | OUTPATIENT
Start: 2024-05-10 | End: 2024-05-10

## 2024-05-10 RX ADMIN — PROPOFOL 100 MG: 10 INJECTION, EMULSION INTRAVENOUS at 12:52

## 2024-05-10 RX ADMIN — PROPOFOL 250 MCG/KG/MIN: 10 INJECTION, EMULSION INTRAVENOUS at 12:53

## 2024-05-10 RX ADMIN — LIDOCAINE HYDROCHLORIDE 100 MG: 20 INJECTION, SOLUTION INTRAVENOUS at 12:52

## 2024-05-10 RX ADMIN — SODIUM CHLORIDE, POTASSIUM CHLORIDE, SODIUM LACTATE AND CALCIUM CHLORIDE 30 ML/HR: 600; 310; 30; 20 INJECTION, SOLUTION INTRAVENOUS at 12:43

## 2024-05-13 LAB
CYTO UR: NORMAL
LAB AP CASE REPORT: NORMAL
LAB AP CLINICAL INFORMATION: NORMAL
LAB AP SPECIAL STAINS: NORMAL
PATH REPORT.FINAL DX SPEC: NORMAL
PATH REPORT.GROSS SPEC: NORMAL

## 2024-05-21 ENCOUNTER — CONSULT (OUTPATIENT)
Dept: ONCOLOGY | Facility: CLINIC | Age: 64
End: 2024-05-21
Payer: COMMERCIAL

## 2024-05-21 ENCOUNTER — LAB (OUTPATIENT)
Dept: LAB | Facility: HOSPITAL | Age: 64
End: 2024-05-21
Payer: COMMERCIAL

## 2024-05-21 VITALS
RESPIRATION RATE: 18 BRPM | TEMPERATURE: 98.1 F | WEIGHT: 241.1 LBS | BODY MASS INDEX: 29.98 KG/M2 | OXYGEN SATURATION: 100 % | DIASTOLIC BLOOD PRESSURE: 77 MMHG | HEIGHT: 75 IN | SYSTOLIC BLOOD PRESSURE: 121 MMHG | HEART RATE: 73 BPM

## 2024-05-21 DIAGNOSIS — D50.9 IRON DEFICIENCY ANEMIA, UNSPECIFIED IRON DEFICIENCY ANEMIA TYPE: Primary | ICD-10-CM

## 2024-05-21 DIAGNOSIS — I63.9 CEREBROVASCULAR ACCIDENT (CVA), UNSPECIFIED MECHANISM: ICD-10-CM

## 2024-05-21 DIAGNOSIS — D50.0 IRON DEFICIENCY ANEMIA DUE TO CHRONIC BLOOD LOSS: Primary | ICD-10-CM

## 2024-05-21 LAB
BASOPHILS # BLD AUTO: 0.09 10*3/MM3 (ref 0–0.2)
BASOPHILS NFR BLD AUTO: 1.1 % (ref 0–1.5)
DEPRECATED RDW RBC AUTO: 50 FL (ref 37–54)
EOSINOPHIL # BLD AUTO: 0.23 10*3/MM3 (ref 0–0.4)
EOSINOPHIL NFR BLD AUTO: 2.8 % (ref 0.3–6.2)
ERYTHROCYTE [DISTWIDTH] IN BLOOD BY AUTOMATED COUNT: 16.3 % (ref 12.3–15.4)
HCT VFR BLD AUTO: 38 % (ref 37.5–51)
HGB BLD-MCNC: 12.4 G/DL (ref 13–17.7)
IMM GRANULOCYTES # BLD AUTO: 0.05 10*3/MM3 (ref 0–0.05)
IMM GRANULOCYTES NFR BLD AUTO: 0.6 % (ref 0–0.5)
LYMPHOCYTES # BLD AUTO: 2.09 10*3/MM3 (ref 0.7–3.1)
LYMPHOCYTES NFR BLD AUTO: 25.2 % (ref 19.6–45.3)
MCH RBC QN AUTO: 27.5 PG (ref 26.6–33)
MCHC RBC AUTO-ENTMCNC: 32.6 G/DL (ref 31.5–35.7)
MCV RBC AUTO: 84.3 FL (ref 79–97)
MONOCYTES # BLD AUTO: 0.82 10*3/MM3 (ref 0.1–0.9)
MONOCYTES NFR BLD AUTO: 9.9 % (ref 5–12)
NEUTROPHILS NFR BLD AUTO: 5.02 10*3/MM3 (ref 1.7–7)
NEUTROPHILS NFR BLD AUTO: 60.4 % (ref 42.7–76)
NRBC BLD AUTO-RTO: 0 /100 WBC (ref 0–0.2)
PLATELET # BLD AUTO: 279 10*3/MM3 (ref 140–450)
PMV BLD AUTO: 9.8 FL (ref 6–12)
RBC # BLD AUTO: 4.51 10*6/MM3 (ref 4.14–5.8)
WBC NRBC COR # BLD AUTO: 8.3 10*3/MM3 (ref 3.4–10.8)

## 2024-05-21 PROCEDURE — 99205 OFFICE O/P NEW HI 60 MIN: CPT | Performed by: INTERNAL MEDICINE

## 2024-05-21 PROCEDURE — 85025 COMPLETE CBC W/AUTO DIFF WBC: CPT

## 2024-05-21 PROCEDURE — 36415 COLL VENOUS BLD VENIPUNCTURE: CPT

## 2024-05-21 RX ORDER — FERROUS SULFATE 325(65) MG
325 TABLET ORAL
Qty: 90 TABLET | Refills: 3 | Status: SHIPPED | OUTPATIENT
Start: 2024-05-21

## 2024-05-21 NOTE — PROGRESS NOTES
[facility]    CONSULTING IN BLOOD DISORDERS & CANCER      REASON FOR CONSULTATION/CHIEF COMPLAINT:     Evaluation and management for iron deficiency anemia                             REQUESTING PHYSICIAN: HUE Louis  RECORDS OBTAINED:  Records of the patients history including those from the electronic medical record were reviewed and summarized in detail.    HISTORY OF PRESENT ILLNESS:    The patient is a 64 y.o. year old male with a history significant for DM 2, GERD and multiple strokes had routine lab work performed by his PCP in April 2024.    Labs on 4/8/2024 noted hemoglobin of 10.8, hematocrit 34.2 and MCV of 83.  Normal WBC and platelets.  Additional labs showed iron deficiency with iron level of 30, iron saturation 9% and ferritin 8.  Folate and vitamin B12 levels were normal.    Patient was referred to hematology and gastroenterology for further evaluation.    He had EGD and colonoscopy by Dr. Domingo on 5/10/2024 at [facility].  The EGD noted 1 nonbleeding gastric ulcer with no stigmata of bleeding at the gastric pylorus.  Biopsy showed active gastritis.  Negative for H. pylori.  Grade a esophagitis with no bleeding was found.  Normal duodenum.  Colonoscopy noted at least 3 polyps, all removed.  Pathology benign.    Patient has been taking over-the-counter oral iron supplementation for last month or so.  He was first seen in this clinic on 5/21/2024.  CBC noted improvement in hemoglobin up to 12.4, MCV 84.3.  He denies dark stools or blood in urine.  Today, his main complaint is ongoing issues with generalized weakness, lightheadedness and imbalance issues.  Says this has been going on for many years and have gotten worse with each stroke episode.  He is currently not following up with neurology.    Past Medical History:   Diagnosis Date    Colon polyp     Diabetes mellitus     Erectile dysfunction     GERD (gastroesophageal reflux disease)     Hyperlipidemia     Hypertension      History  Chief Complaint   Patient presents with    Vomiting     Pt statrted this afternoon with vomiting and watery diarrhea    Diarrhea      This is a 70-year-old female presents with nausea vomiting and diarrhea that started this afternoon she denies any blood in the vomitus or diarrhea no recent travel or antibiotic use diarrhea is described as watery denies any abdominal pain fevers or chills        History provided by:  Patient  Vomiting   Severity:  Moderate  Timing:  Constant  Progression:  Unchanged  Chronicity:  New  Relieved by:  Nothing  Associated symptoms: diarrhea    Associated symptoms: no abdominal pain    Risk factors: not pregnant    Diarrhea   Associated symptoms: vomiting    Associated symptoms: no abdominal pain        Prior to Admission Medications   Prescriptions Last Dose Informant Patient Reported? Taking?   aspirin (ECOTRIN LOW STRENGTH) 81 mg EC tablet   Yes Yes   Sig: Take 81 mg by mouth daily   diflunisal (DOLOBID) 500 mg tablet   Yes Yes   Sig: Take 500 mg by mouth 2 (two) times a day   escitalopram (LEXAPRO) 20 mg tablet   Yes Yes   Sig: Take 20 mg by mouth daily   traMADol (ULTRAM) 50 mg tablet   Yes Yes   Sig: Take 50 mg by mouth every 6 (six) hours as needed for moderate pain   traZODone (DESYREL) 100 mg tablet   Yes Yes   Sig: Take 50 mg by mouth daily at bedtime      Facility-Administered Medications: None       Past Medical History:   Diagnosis Date    Psychiatric disorder        History reviewed  No pertinent surgical history  History reviewed  No pertinent family history  I have reviewed and agree with the history as documented  Social History   Substance Use Topics    Smoking status: Never Smoker    Smokeless tobacco: Not on file    Alcohol use No        Review of Systems   Gastrointestinal: Positive for diarrhea and vomiting  Negative for abdominal pain  All other systems reviewed and are negative        Physical Exam  ED Triage Vitals [12/19/17 1534] Stroke     X3 SINCE 2020    Urinary urgency      Past Surgical History:   Procedure Laterality Date    APPENDECTOMY      COLONOSCOPY      COLONOSCOPY N/A 5/10/2024    Procedure: COLONOSCOPY WITH POLYPECTOMIES HOT SNARE, BIOPSIES;  Surgeon: Deangelo Domingo MD;  Location: [facility] ENDOSCOPY;  Service: General;  Laterality: N/A;  COLON POLYPS    ENDOSCOPY N/A 5/10/2024    Procedure: ESOPHAGOGASTRODUODENOSCOPY WITH BIOPSIES;  Surgeon: Deangelo Domingo MD;  Location: [facility] ENDOSCOPY;  Service: General;  Laterality: N/A;  PYLORIC ULCER, ESOPHAGITIS       MEDICATIONS    Current Outpatient Medications:     amLODIPine (NORVASC) 10 MG tablet, Take 1 tablet by mouth Daily., Disp: 90 tablet, Rfl: 1    aspirin 81 MG chewable tablet, Chew 1 tablet Daily., Disp: , Rfl:     atorvastatin (LIPITOR) 80 MG tablet, Take 1 tablet by mouth Daily., Disp: 90 tablet, Rfl: 0    dapagliflozin Propanediol (Farxiga) 10 MG tablet, Take 1 tablet by mouth Daily., Disp: 90 tablet, Rfl: 1    glucose blood (Pharmacist Choice Autocode) test strip, Use to test blood sugar 2 (Two) Times a Day., Disp: 100 each, Rfl: 2    melatonin 5 MG tablet tablet, Take 1 tablet by mouth Every Night., Disp: , Rfl:     metFORMIN (GLUCOPHAGE) 1000 MG tablet, Take 1 tablet by mouth twice Daily With Breakfast & Dinner., Disp: 180 tablet, Rfl: 1    metoprolol succinate XL (TOPROL-XL) 25 MG 24 hr tablet, Take 1 & 1/2 tablets by mouth Daily., Disp: 135 tablet, Rfl: 1    Microlet Lancets misc, Use to check blood sugar twice a day., Disp: 100 each, Rfl: 1    pantoprazole (Protonix) 20 MG EC tablet, Take 1 tablet by mouth Daily., Disp: 30 tablet, Rfl: 1    sacubitril-valsartan (Entresto) 49-51 MG tablet, Take 1 tablet by mouth 2 (Two) Times a Day., Disp: 60 tablet, Rfl: 3    Semaglutide,0.25 or 0.5MG/DOS, (OZEMPIC) 2 MG/3ML solution pen-injector, Inject 0.25 mg under the skin into the appropriate area as directed 1 (One) Time Per Week., Disp: , Rfl:     sucralfate  Temperature Pulse Respirations Blood Pressure SpO2   98 9 °F (37 2 °C) (!) 123 18 123/74 97 %      Temp Source Heart Rate Source Patient Position - Orthostatic VS BP Location FiO2 (%)   Tympanic Monitor Sitting Right arm --      Pain Score       --           Orthostatic Vital Signs  Vitals:    12/19/17 1534   BP: 123/74   Pulse: (!) 123   Patient Position - Orthostatic VS: Sitting       Physical Exam   Constitutional: She is oriented to person, place, and time  She appears well-developed and well-nourished  No distress  HENT:   Head: Normocephalic and atraumatic  Right Ear: External ear normal    Left Ear: External ear normal    Nose: Nose normal    Mucous membranes dry   Eyes: Conjunctivae and EOM are normal  Pupils are equal, round, and reactive to light  No scleral icterus  Neck: Normal range of motion  Neck supple  No tracheal deviation present  Cardiovascular: Regular rhythm  Exam reveals no gallop and no friction rub  No murmur heard  Tachycardic   Pulmonary/Chest: Effort normal and breath sounds normal  No stridor  No respiratory distress  She has no wheezes  She has no rales  Abdominal: Soft  Bowel sounds are normal  She exhibits no distension  There is no tenderness  There is no guarding  Musculoskeletal: Normal range of motion  She exhibits no edema, tenderness or deformity  Neurological: She is alert and oriented to person, place, and time  No cranial nerve deficit  Coordination normal    Skin: Skin is warm and dry  No rash noted  She is not diaphoretic  Psychiatric: She has a normal mood and affect  Her behavior is normal  Thought content normal    Nursing note and vitals reviewed        ED Medications  Medications   sodium chloride 0 9 % bolus 1,000 mL (not administered)   traMADol (ULTRAM) tablet 50 mg (not administered)   ondansetron (ZOFRAN) injection 4 mg (4 mg Intravenous Given 12/19/17 1545)       Diagnostic Studies  Results Reviewed     Procedure Component Value Units "(Carafate) 1 g tablet, Take 1 tablet by mouth 3 times a day for 14 days., Disp: 42 tablet, Rfl: 0    tamsulosin (FLOMAX) 0.4 MG capsule 24 hr capsule, Take 1 capsule by mouth Daily., Disp: 90 capsule, Rfl: 1    ticagrelor (Brilinta) 60 MG tablet tablet, Take 1 tablet by mouth 2 (Two) Times a Day., Disp: 28 tablet, Rfl: 0    ferrous sulfate 325 (65 FE) MG tablet, Take 1 tablet by mouth Daily With Breakfast., Disp: 90 tablet, Rfl: 3    ALLERGIES:   No Known Allergies    SOCIAL HISTORY:       Social History     Socioeconomic History    Marital status:    Tobacco Use    Smoking status: Former     Current packs/day: 0.00     Average packs/day: 0.5 packs/day for 42.2 years (21.1 ttl pk-yrs)     Types: Cigarettes     Start date: 1982     Quit date: 2024     Years since quittin.1    Smokeless tobacco: Never   Vaping Use    Vaping status: Never Used   Substance and Sexual Activity    Alcohol use: Yes     Comment: OCCASIONAL    Drug use: Never    Sexual activity: Defer         FAMILY HISTORY:  Family History   Problem Relation Age of Onset    Colon cancer Father        REVIEW OF SYSTEMS:  As per HPI         Vitals:    24 1204   BP: 121/77   Pulse: 73   Resp: 18   Temp: 98.1 °F (36.7 °C)   TempSrc: Oral   SpO2: 100%   Weight: 109 kg (241 lb 1.6 oz)   Height: 190.5 cm (75\")   PainSc: 0-No pain         2024    12:01 PM   Current Status   ECOG score 1      PHYSICAL EXAM:    CONSTITUTIONAL:  Vital signs reviewed.  No distress, looks comfortable.  EYES:  Conjunctiva and lids unremarkable.   EARS,NOSE,MOUTH,THROAT:  Ears and nose appear unremarkable.  Lips, teeth, gums appear unremarkable.  RESPIRATORY:  Normal respiratory effort.  Lungs clear to auscultation bilaterally.  CARDIOVASCULAR:  Normal S1, S2.  No murmurs rubs or gallops.  No significant lower extremity edema.  GASTROINTESTINAL: Abdomen appears unremarkable.  No distention  LYMPHATIC:  No cervical, supraclavicular lymphadenopathy.  NEURO: " Date/Time    Lipase [[id number]]  (Normal) Collected:  12/19/17 1544    Lab Status:  Final result Specimen:  Blood from Arm, Left Updated:  12/19/17 1626     Lipase 243 u/L     Comprehensive metabolic panel [[id number]]  (Abnormal) Collected:  12/19/17 1544    Lab Status:  Final result Specimen:  Blood from Arm, Left Updated:  12/19/17 1623     Sodium 137 mmol/L      Potassium 3 4 (L) mmol/L      Chloride 101 mmol/L      CO2 25 mmol/L      Anion Gap 11 mmol/L      BUN 15 mg/dL      Creatinine 0 91 mg/dL      Glucose 125 mg/dL      Calcium 9 2 mg/dL      AST 27 U/L      ALT 34 U/L      Alkaline Phosphatase 71 U/L      Total Protein 8 3 (H) g/dL      Albumin 4 4 g/dL      Total Bilirubin 0 60 mg/dL      eGFR 78 ml/min/1 73sq m     Narrative:         National Kidney Disease Education Program recommendations are as follows:  GFR calculation is accurate only with a steady state creatinine  Chronic Kidney disease less than 60 ml/min/1 73 sq  meters  Kidney failure less than 15 ml/min/1 73 sq  meters      CBC and differential [[id number]]  (Abnormal) Collected:  12/19/17 1544    Lab Status:  Final result Specimen:  Blood from Arm, Left Updated:  12/19/17 1605     WBC 12 58 (H) Thousand/uL      RBC 5 00 Million/uL      Hemoglobin 15 7 (H) g/dL      Hematocrit 46 3 (H) %      MCV 93 fL      MCH 31 4 pg      MCHC 33 9 g/dL      RDW 13 2 %      MPV 10 5 fL      Platelets 793 Thousands/uL      Neutrophils Relative 88 (H) %      Lymphocytes Relative 7 (L) %      Monocytes Relative 4 %      Eosinophils Relative 1 %      Basophils Relative 0 %      Neutrophils Absolute 11 09 (H) Thousands/µL      Lymphocytes Absolute 0 88 Thousands/µL      Monocytes Absolute 0 50 Thousand/µL      Eosinophils Absolute 0 10 Thousand/µL      Basophils Absolute 0 01 Thousands/µL                  No orders to display              Procedures  Procedures       Phone Contacts  ED Phone Contact    ED Course  ED Course as of Dec 19 1649   Tue Dec 19, 2017   1644 AAOx3, no gross focal deficits.  Appears to have equal strength all 4 extremities.  Has diminished sensation in left upper extremity  MUSCULOSKELETAL:  Unremarkable digits/nails.  No cyanosis or clubbing.  No apparent joint deformities.  SKIN:  Warm.  No rashes.  PSYCHIATRIC:  Normal judgment and insight.  Normal mood and affect.     RECENT LABS:        Lab on 05/21/2024   Component Date Value Ref Range Status    WBC 05/21/2024 8.30  3.40 - 10.80 10*3/mm3 Final    RBC 05/21/2024 4.51  4.14 - 5.80 10*6/mm3 Final    Hemoglobin 05/21/2024 12.4 (L)  13.0 - 17.7 g/dL Final    Hematocrit 05/21/2024 38.0  37.5 - 51.0 % Final    MCV 05/21/2024 84.3  79.0 - 97.0 fL Final    MCH 05/21/2024 27.5  26.6 - 33.0 pg Final    MCHC 05/21/2024 32.6  31.5 - 35.7 g/dL Final    RDW 05/21/2024 16.3 (H)  12.3 - 15.4 % Final    RDW-SD 05/21/2024 50.0  37.0 - 54.0 fl Final    MPV 05/21/2024 9.8  6.0 - 12.0 fL Final    Platelets 05/21/2024 279  140 - 450 10*3/mm3 Final    Neutrophil % 05/21/2024 60.4  42.7 - 76.0 % Final    Lymphocyte % 05/21/2024 25.2  19.6 - 45.3 % Final    Monocyte % 05/21/2024 9.9  5.0 - 12.0 % Final    Eosinophil % 05/21/2024 2.8  0.3 - 6.2 % Final    Basophil % 05/21/2024 1.1  0.0 - 1.5 % Final    Immature Grans % 05/21/2024 0.6 (H)  0.0 - 0.5 % Final    Neutrophils, Absolute 05/21/2024 5.02  1.70 - 7.00 10*3/mm3 Final    Lymphocytes, Absolute 05/21/2024 2.09  0.70 - 3.10 10*3/mm3 Final    Monocytes, Absolute 05/21/2024 0.82  0.10 - 0.90 10*3/mm3 Final    Eosinophils, Absolute 05/21/2024 0.23  0.00 - 0.40 10*3/mm3 Final    Basophils, Absolute 05/21/2024 0.09  0.00 - 0.20 10*3/mm3 Final    Immature Grans, Absolute 05/21/2024 0.05  0.00 - 0.05 10*3/mm3 Final    nRBC 05/21/2024 0.0  0.0 - 0.2 /100 WBC Final         ASSESSMENT:  Patient is a pleasant 64-year-old male with a history significant for DM 2, GERD and multiple strokes comes for her iron deficiency anemia evaluation and management.      # Iron deficiency anemia:  Patient feels like she is withdrawing from her tramadol she normally takes set when she drives and at work will give her 1 dose here                                MDM  Number of Diagnoses or Management Options  Diagnosis management comments:  Vomiting and diarrhea most consistent with infectious gastroenteritis will check basic  Electrolytes and give IV fluid resuscitation       Amount and/or Complexity of Data Reviewed  Clinical lab tests: ordered      CritCare Time    Disposition  Final diagnoses:   Vomiting   Diarrhea     Time reflects when diagnosis was documented in both MDM as applicable and the Disposition within this note     Time User Action Codes Description Comment    12/19/2017  4:45 PM [facility] [R11 10] Vomiting     12/19/2017  4:45 PM Artemio Ear Flavio [R19 7] Diarrhea       ED Disposition     ED Disposition Condition Comment    Discharge  Briseida Azul discharge to home/self care  Condition at discharge: Stable        Follow-up Information     Follow up With Specialties Details Why Kendra Briceño MD  In 3 days As needed [address] [facility], [address] [place] [address]  [phone]          Patient's Medications   Discharge Prescriptions    ONDANSETRON (ZOFRAN) 4 MG TABLET    Take 1 tablet by mouth every 6 (six) hours as needed for nausea or vomiting       Start Date: 12/19/2017End Date: --       Order Dose: 4 mg       Quantity: 12 tablet    Refills: 0     No discharge procedures on file      ED Provider  Electronically Signed by           Prince Collet, DO  12/19/17 4046   H/H 10.8/34.2. MCV of 83 in April 2024.  Iron file showed iron deficiency with iron level of 30, iron saturation 9% and ferritin 8.  Folate and vitamin B12 levels were normal.    He had EGD and colonoscopy by Dr. Domingo on 5/10/2024 at [facility].  The EGD noted 1 nonbleeding gastric ulcer with no stigmata of bleeding at the gastric pylorus.  Biopsy showed active gastritis.  Negative for H. pylori.  Grade a esophagitis with no bleeding was found.  Normal duodenum.  Colonoscopy noted at least 3 polyps, all removed.  Pathology benign.  Informed patient that his iron deficiency appears to be related to gastric ulcer/gastritis.  He has been taking over-the-counter oral iron supplementation for last month or so.  Repeat hemoglobin on 5/21/2024 has improved to 12.4 g/dL.  Patient left the office before iron profile could be drawn.    Since he is taking an OTC iron supplementation and doses not clear, I will go ahead and send a prescription for ferrous sulfate 325 mg daily.  Educated on potential side effects including gastric discomfort and constipation and advised to take stool softeners if needed.  Patient states he lives in [place] and would prefer to see a hematologist at [facility].  Will send a referral.  I will see him back in this clinic on a as needed basis    # Lightheadedness and gait instability:  Suspect multiple prior strokes and diabetic neuropathy related.   Will send a referral to neurology.     # Gastric ulcer and gastritis:  EGD from May 2024 noted gastric pyloric ulcer.  On PPI and Carafate.  Advised follow-up with GI at [facility].    Also advised to discuss with cardiology and neurology whether he needs to stay on aspirin and Brilinta.      # History of recurrent strokes:   Patient states he has had at least 4 strokes.  Most recent in early 2024.  Is not following with neurology.    Will send a referral to [facility] neurology     PLAN:   -Check iron profile and ferritin  level  -Continue oral iron supplementation.  Sent a prescription for ferrous sulfate 325 mg daily  -Refer to neurology at [facility]  -Refer to hematology at [facility]  -Advised close follow-up with PCP and GI  -Will see him back in [facility] hematology clinic on a as needed basis    Orders Placed This Encounter   Procedures    Ferritin     Order Specific Question:   Release to patient     Answer:   Routine Release [[id number]]    Iron Profile     Order Specific Question:   Release to patient     Answer:   Routine Release [[id number]]    Ambulatory Referral to Neurology     Referral Priority:   Routine     Referral Type:   Consultation     Referral Reason:   Specialty Services Required     Requested Specialty:   Neurology     Number of Visits Requested:   1    Ambulatory Referral to Hematology / Oncology     Referral Priority:   Routine     Referral Type:   Consultation     Referral Reason:   Specialty Services Required     Requested Specialty:   Hematology and Oncology     Number of Visits Requested:   1   Total time spent during this patient encounter is 65 minutes. The total time spent with the patient includes: preparing to see the patient by reviewing of tests, prior notes or other relevant information, performing appropriate independent examination & evaluation, counseling, ordering of medications, tests or procedures, documenting clinic information in the electronic medical records or other health records, independently interpreting results of tests and communicating the results to the patient/family or caregiver.

## 2024-06-06 ENCOUNTER — TELEPHONE (OUTPATIENT)
Dept: SURGERY | Facility: CLINIC | Age: 64
End: 2024-06-06

## 2024-06-06 RX ORDER — SACUBITRIL AND VALSARTAN 49; 51 MG/1; MG/1
1 TABLET, FILM COATED ORAL 2 TIMES DAILY
Qty: 60 TABLET | Refills: 0 | Status: SHIPPED | OUTPATIENT
Start: 2024-06-06

## 2024-06-06 NOTE — TELEPHONE ENCOUNTER
Rx Refill Note  Requested Prescriptions     Pending Prescriptions Disp Refills    sacubitril-valsartan (Entresto) 49-51 MG tablet 60 tablet 0     Sig: Take 1 tablet by mouth 2 (Two) Times a Day. Future refills need appointment      Last office visit with prescribing clinician: 12/4/2023     Next office visit with prescribing clinician: Visit date not found      Gave no refills needs appointment.      Justine Reyes MA  06/06/24, 08:39 EDT

## 2024-06-13 NOTE — TELEPHONE ENCOUNTER
2ND CALL TO PT TO RS NO SHOW APPT W/APRIL/PT SAID THAT HE IS NOT HAVING ANY ISSUES AND DOES NOT WANT TO RS APPT/ANYTHING ELSE TO DO??

## 2024-06-25 ENCOUNTER — TELEPHONE (OUTPATIENT)
Dept: SURGERY | Facility: CLINIC | Age: 64
End: 2024-06-25
Payer: COMMERCIAL

## 2024-06-25 NOTE — TELEPHONE ENCOUNTER
I have called and spoke to the Pt about Dr. Domingo recommendation to repeat his EGD to make sure his ulcer is healing. Pt stated that he currently doesn't have a job so he is without benefits. Pt also stated that he is feeling fine.

## 2024-07-08 RX ORDER — SACUBITRIL AND VALSARTAN 49; 51 MG/1; MG/1
1 TABLET, FILM COATED ORAL 2 TIMES DAILY
Qty: 60 TABLET | Refills: 0 | Status: SHIPPED | OUTPATIENT
Start: 2024-07-08

## 2024-08-01 RX ORDER — SACUBITRIL AND VALSARTAN 49; 51 MG/1; MG/1
1 TABLET, FILM COATED ORAL 2 TIMES DAILY
Qty: 60 TABLET | Refills: 0 | OUTPATIENT
Start: 2024-08-01

## 2024-08-20 ENCOUNTER — PATIENT OUTREACH (OUTPATIENT)
Dept: CASE MANAGEMENT | Facility: OTHER | Age: 64
End: 2024-08-20
Payer: COMMERCIAL

## 2024-08-20 NOTE — OUTREACH NOTE
"AMBULATORY CASE MANAGEMENT NOTE    Names and Relationships of Patient/Support Persons: Contact: Zuri Lester \"Ramon\"; Relationship: Self -     Education Documentation  Blood Pressure Monitoring, taught by Sapphire Esquivel, ADEN at 8/20/2024 12:42 PM.  Learner: Patient  Readiness: Eager  Method: Explanation  Response: Verbalizes Understanding    Risk Factors, taught by Sapphire Esquivel, ADEN at 8/20/2024 12:42 PM.  Learner: Patient  Readiness: Eager  Method: Explanation  Response: Verbalizes Understanding    Healthy Food Choices, taught by Sapphire Esquivel, ADEN at 8/20/2024 12:42 PM.  Learner: Patient  Readiness: Eager  Method: Explanation  Response: Verbalizes Understanding    Blood Glucose Monitoring, taught by Sapphire Esquivel RN at 8/20/2024 12:42 PM.  Learner: Patient  Readiness: Eager  Method: Explanation  Response: Verbalizes Understanding    Diabetes, Type 2, taught by Sapphire Esquivel RN at 8/20/2024 12:42 PM.  Learner: Patient  Readiness: Eager  Method: Explanation  Response: Verbalizes Understanding    Signs/Symptoms, taught by Sapphire Esquivel RN at 8/20/2024 12:42 PM.  Learner: Patient  Readiness: Eager  Method: Explanation  Response: Verbalizes Understanding      Adult Patient Profile  Questions/Answers      Flowsheet Row Most Recent Value   Symptoms/Conditions Managed at Home diabetes, type 2, cardiovascular   Barriers to Managing Health none   Cardiovascular Symptoms/Conditions high blood cholesterol, coronary artery disease, hypertension   Cardiovascular Management Strategies weight management, medication therapy   Cardiovascular Self-Management Outcome 3 (uncertain)   Diabetes Management Strategies exercise, medication therapy, blood glucose testing, routine screenings, weight management   A1C Target <7   Last A1C Result 6.6   Diabetes Self-Management Outcome 5 (very good)   Taking Medications Not Prescribed no   Missed Doses of Prescribed Medications During Past Week no   Taken Prescribed Medications at Different Time or " Schedule During Past Week no   Taken More or Less Medication Than Prescribed no   Barriers to Taking Medication as Prescribed none   How to be Addressed Ramon   How Well Do You Speak English? very well   Source of Information patient        Adult Wellness Assessment  Questions/Answers      Flowsheet Row Most Recent Value   How often do you have someone help you read facts about your health? never   How often do you have trouble learning about your health because you don't know what the written words mean? never   How confident are you filling out forms by yourself? always        Send Education  Questions/Answers      Flowsheet Row Most Recent Value   Annual Wellness Visit:  Patient Has Completed   Other Patient Education/Resources  24/7 [facility] Nurse Call Line   24/7 Nurse Call Line Education Method Verbal   Advanced Directives: Not Interested At This Time        Patient Outreach    I contacted Ramon to encourage him to sign up for [employer].   He states he had at one time but I was unable to locate an account for him.   I encouraged him to sign up again.   He recently suffered a stroke and is out on FMLA.   I educated on the importance of controlling blood sugars and blood pressure to prevent future strokes.  I have encouraged him to contact me with new needs.      As with any education provided during the call, the patient was reminded to check with their MD before making any changes to their current health regimen.  Educated on availability of nurseline and its use.  All basic needs are met. No issue with social determinants.  No inabilities to obtain food or medications or transportation to MD appointments.  Educated patient on benefits of Employee CM program and invited to call with any new needs.    [employer]  Ambulatory Case Management    8/20/2024, 12:43 EDT

## 2024-09-19 ENCOUNTER — TELEPHONE (OUTPATIENT)
Dept: UROLOGY | Facility: CLINIC | Age: 64
End: 2024-09-19
Payer: COMMERCIAL

## 2024-09-23 ENCOUNTER — TELEPHONE (OUTPATIENT)
Dept: UROLOGY | Facility: CLINIC | Age: 64
End: 2024-09-23
Payer: COMMERCIAL

## 2024-09-23 NOTE — TELEPHONE ENCOUNTER
CALLED PT TO RS CX APPT ON 9/24/24 JIMBO/ANJEL/EARNEST SAID THAT HE WILL CALL US BACK WHEN HE WANTS TO RS APPT

## 2024-10-02 NOTE — TELEPHONE ENCOUNTER
2ND CALL TO PT TO RS CX APPT W/ANJEL/PT SAID THAT HE WILL CALL US WHEN HE WANTS TO RS APPT/ANYTHING ELSE TO DO??

## 2025-07-28 ENCOUNTER — TELEPHONE (OUTPATIENT)
Dept: UROLOGY | Age: 65
End: 2025-07-28
Payer: MEDICARE

## 2025-07-28 ENCOUNTER — OFFICE VISIT (OUTPATIENT)
Dept: UROLOGY | Facility: CLINIC | Age: 65
End: 2025-07-28
Payer: MEDICARE

## 2025-07-28 VITALS — BODY MASS INDEX: 29.97 KG/M2 | HEIGHT: 75 IN | WEIGHT: 241 LBS | RESPIRATION RATE: 18 BRPM

## 2025-07-28 DIAGNOSIS — N39.41 URGE INCONTINENCE OF URINE: ICD-10-CM

## 2025-07-28 DIAGNOSIS — N40.1 BENIGN PROSTATIC HYPERPLASIA (BPH) WITH STRAINING ON URINATION: ICD-10-CM

## 2025-07-28 DIAGNOSIS — R39.15 URINARY URGENCY: Primary | ICD-10-CM

## 2025-07-28 DIAGNOSIS — R39.16 BENIGN PROSTATIC HYPERPLASIA (BPH) WITH STRAINING ON URINATION: ICD-10-CM

## 2025-07-28 PROBLEM — Z91.89 AT RISK OF DISEASE: Status: RESOLVED | Noted: 2022-01-06 | Resolved: 2025-07-28

## 2025-07-28 LAB
BILIRUB BLD-MCNC: NEGATIVE MG/DL
CLARITY, POC: CLEAR
COLOR UR: YELLOW
GLUCOSE UR STRIP-MCNC: ABNORMAL MG/DL
KETONES UR QL: NEGATIVE
LEUKOCYTE EST, POC: NEGATIVE
NITRITE UR-MCNC: NEGATIVE MG/ML
PH UR: 5.5 [PH] (ref 5–8)
PROT UR STRIP-MCNC: ABNORMAL MG/DL
RBC # UR STRIP: ABNORMAL /UL
SP GR UR: 1.02 (ref 1–1.03)
URINE VOLUME: NORMAL
UROBILINOGEN UR QL: ABNORMAL

## 2025-07-28 PROCEDURE — G2211 COMPLEX E/M VISIT ADD ON: HCPCS | Performed by: NURSE PRACTITIONER

## 2025-07-28 PROCEDURE — 1159F MED LIST DOCD IN RCRD: CPT | Performed by: NURSE PRACTITIONER

## 2025-07-28 PROCEDURE — 99214 OFFICE O/P EST MOD 30 MIN: CPT | Performed by: NURSE PRACTITIONER

## 2025-07-28 PROCEDURE — 81002 URINALYSIS NONAUTO W/O SCOPE: CPT | Performed by: NURSE PRACTITIONER

## 2025-07-28 PROCEDURE — 51798 US URINE CAPACITY MEASURE: CPT | Performed by: NURSE PRACTITIONER

## 2025-07-28 PROCEDURE — 1160F RVW MEDS BY RX/DR IN RCRD: CPT | Performed by: NURSE PRACTITIONER

## 2025-07-28 RX ORDER — TAMSULOSIN HYDROCHLORIDE 0.4 MG/1
2 CAPSULE ORAL DAILY
Qty: 180 CAPSULE | Refills: 3 | Status: SHIPPED | OUTPATIENT
Start: 2025-07-28

## 2025-07-28 RX ORDER — PRAZOSIN HYDROCHLORIDE 2 MG/1
2 CAPSULE ORAL NIGHTLY
COMMUNITY
End: 2025-07-28

## 2025-07-28 RX ORDER — SOLIFENACIN SUCCINATE 5 MG/1
5 TABLET, FILM COATED ORAL DAILY
Qty: 90 TABLET | Refills: 3 | Status: SHIPPED | OUTPATIENT
Start: 2025-07-28

## 2025-07-28 NOTE — PROGRESS NOTES
"Chief Complaint: Follow-Up and Urinary Urgency    Subjective         History of Present Illness  Lester Keating is a 65 y.o. male presents to [facility] to be seen for urinary urgency.    Patient previously seen by myself in March 2023.    I had increased his tamsulosin to 0.8 mg daily and recommended an 8-week follow-up appointment with the consideration of urodynamics testing or cystoscopy if no change in his symptoms.    The patient no-showed to his follow-up appointment and was lost to follow-up.    The patient called in September 2024 requesting to speak to myself asking about his tamsulosin and possible side effects with new onset urgency frequency that had progressively gotten worse.    He was referred to the appointment desk and was made an appointment for September 2024.    The patient called the day before his appointment to cancel his appointment and never called back to reschedule however appears that the patient called into the hub on 13 May requesting to be seen and was placed on my schedule today for \"follow-up urinary problems\"    His PCP refilled tamsulosin in 8/2024 but only been on 0.4 mg q day.     He was not on prazosin until his PCP refilled this last month.     He is out of this and it does have refills.     He states urgency and frequency with urination.    He states he leaks before he can get there.    He states he is wetting the bed.     He reports a weak stream.     He states straining with urination.     Nocturia x 4-5      Previous:  At last visit we recommended the patient to continue on tamsulosin 0.4 mg daily.     He was to follow-up with Dr. Chowdhury however patient does not wish to go to [place] and was lost to follow-up until recently.     Patient had PSA level checked on 3/27/2023 and was found to be 0.724.     He states his stream has improved.      He states frequency every 2 hours.      He states urge incontinence.     He states he is no longer " straining to void.      His main issue is the leakage.      He reports that he is with no change in leakage with tamsulosin .      Previous:      He states that for about a year he has been with issues with urinary urgency and frequency with leakage.      He is T2 dm with good glycemic control.      Nocturia x 3-4      He will have a strong urge to go and has to really hold his urine to prevent leakage.     He has had episodes of leakage at least once a day.     He voids every 2 hours during the day.      He feels like he has to strain to empty.     Urinary stream is weak.      He also c/o ED and states sildenafil does not work to give him an erection.      He has seen [facility] for peyronie disease and urgency with frequency.      He states that he has tried tadalafil for lower urinary tract symptoms as well as ED and it did not help to alleviate either set of symptoms.     He states he has not had PSA level checked.           Objective     Past Medical History:   Diagnosis Date    Colon polyp     Diabetes mellitus     Erectile dysfunction     GERD (gastroesophageal reflux disease)     Hyperlipidemia     Hypertension     Stroke     X3 SINCE 2020    Urinary urgency        Past Surgical History:   Procedure Laterality Date    APPENDECTOMY      COLONOSCOPY      COLONOSCOPY N/A 5/10/2024    Procedure: COLONOSCOPY WITH POLYPECTOMIES HOT SNARE, BIOPSIES;  Surgeon: Deangelo Domingo MD;  Location: [facility] ENDOSCOPY;  Service: General;  Laterality: N/A;  COLON POLYPS    ENDOSCOPY N/A 5/10/2024    Procedure: ESOPHAGOGASTRODUODENOSCOPY WITH BIOPSIES;  Surgeon: Deangelo Domingo MD;  Location: [facility] ENDOSCOPY;  Service: General;  Laterality: N/A;  PYLORIC ULCER, ESOPHAGITIS         Current Outpatient Medications:     amLODIPine (NORVASC) 10 MG tablet, Take 1 tablet by mouth Daily., Disp: 90 tablet, Rfl: 1    aspirin 81 MG chewable tablet, Chew 1 tablet Daily., Disp: , Rfl:     atorvastatin (LIPITOR) 80 MG  tablet, Take 1 tablet by mouth Daily., Disp: 90 tablet, Rfl: 0    dapagliflozin Propanediol (Farxiga) 10 MG tablet, Take 1 tablet by mouth Daily., Disp: 90 tablet, Rfl: 1    ferrous sulfate 325 (65 FE) MG tablet, Take 1 tablet by mouth Daily With Breakfast., Disp: 90 tablet, Rfl: 3    glucose blood (Pharmacist Choice Autocode) test strip, Use to test blood sugar 2 (Two) Times a Day., Disp: 100 each, Rfl: 2    melatonin 5 MG tablet tablet, Take 1 tablet by mouth Every Night., Disp: , Rfl:     Microlet Lancets misc, Use to check blood sugar twice a day., Disp: 100 each, Rfl: 1    pantoprazole (PROTONIX) 20 MG EC tablet, Take 1 tablet by mouth Daily., Disp: 30 tablet, Rfl: 3    sacubitril-valsartan (Entresto) 49-51 MG tablet, Take 1 tablet by mouth 2 (Two) Times a Day. Future refills need appointment, Disp: 60 tablet, Rfl: 0    Semaglutide,0.25 or 0.5MG/DOS, (OZEMPIC) 2 MG/3ML solution pen-injector, Inject 0.25 mg under the skin into the appropriate area as directed 1 (One) Time Per Week., Disp: , Rfl:     ticagrelor (Brilinta) 60 MG tablet tablet, Take 1 tablet by mouth 2 (Two) Times a Day., Disp: 28 tablet, Rfl: 0    solifenacin (VESICARE) 5 MG tablet, Take 1 tablet by mouth Daily., Disp: 90 tablet, Rfl: 3    tamsulosin (FLOMAX) 0.4 MG capsule 24 hr capsule, Take 2 capsules by mouth Daily., Disp: 180 capsule, Rfl: 3    No Known Allergies     Family History   Problem Relation Age of Onset    Colon cancer Father        Social History     Socioeconomic History    Marital status:    Tobacco Use    Smoking status: Former     Current packs/day: 0.00     Average packs/day: 0.5 packs/day for 42.2 years (21.1 ttl pk-yrs)     Types: Cigarettes     Start date: 1982     Quit date: 2024     Years since quittin.3    Smokeless tobacco: Never   Vaping Use    Vaping status: Never Used   Substance and Sexual Activity    Alcohol use: Yes     Comment: OCCASIONAL    Drug use: Never    Sexual activity: Defer  "      Vital Signs:   Resp 18   Ht 190.5 cm (75\")   Wt 109 kg (241 lb)   BMI 30.12 kg/m²      Physical Exam     Result Review :   The following data was reviewed by: HUE Robles on 07/28/2025:  Results for orders placed or performed in visit on 07/28/25   POC Urinalysis Dipstick    Collection Time: 07/28/25 10:35 AM    Specimen: Urine   Result Value Ref Range    Color Yellow Yellow, Straw, Dark Yellow, Hetal    Clarity, UA Clear Clear    Glucose, UA >=1000 mg/dL (3+) (A) Negative mg/dL    Bilirubin Negative Negative    Ketones, UA Negative Negative    Specific Gravity  1.020 1.005 - 1.030    Blood, UA Small (A) Negative    pH, Urine 5.5 5.0 - 8.0    Protein,  mg/dL (A) Negative mg/dL    Urobilinogen, UA 0.2 E.U./dL Normal, 0.2 E.U./dL    Leukocytes Negative Negative    Nitrite, UA Negative Negative   Bladder Scan    Collection Time: 07/28/25 10:45 AM   Result Value Ref Range    Urine Volume 0ml         Bladder Scan interpretation 07/28/2025    Estimation of residual urine via BVI 3000 [address]athon Bladder Scan  MA/nurse performing: deion jean Ma   Residual Urine: 0 ml  Indication: Urinary urgency    Urge incontinence of urine    Benign prostatic hyperplasia (BPH) with straining on urination   Position: Supine  Examination: Incremental scanning of the suprapubic area using 2.0 MHz transducer using copious amounts of acoustic gel.   Findings: An anechoic area was demonstrated which represented the bladder, with measurement of residual urine as noted. I inspected this myself. In that the residual urine was stable or insignificant, refer to plan for treatment and plan necessary at this time.          Procedures        Assessment and Plan    Diagnoses and all orders for this visit:    1. Urinary urgency (Primary)  -     POC Urinalysis Dipstick  -     tamsulosin (FLOMAX) 0.4 MG capsule 24 hr capsule; Take 2 capsules by mouth Daily.  Dispense: 180 capsule; Refill: 3  -     Cystoscopy; Future  -     " "Discontinue: Vibegron 75 MG tablet; Take 1 tablet by mouth Daily.  Dispense: 90 tablet; Refill: 3  -     solifenacin (VESICARE) 5 MG tablet; Take 1 tablet by mouth Daily.  Dispense: 90 tablet; Refill: 3    2. Urge incontinence of urine  -     Discontinue: Vibegron 75 MG tablet; Take 1 tablet by mouth Daily.  Dispense: 90 tablet; Refill: 3  -     solifenacin (VESICARE) 5 MG tablet; Take 1 tablet by mouth Daily.  Dispense: 90 tablet; Refill: 3    3. Benign prostatic hyperplasia (BPH) with straining on urination    Other orders  -     Bladder Scan      We will increase tamsulosin once again.     We will have him start on oab medication to see if this will help to alleviate his symptoms.     Discussed common side effects of dry mouth/ eyes/ constipation and may increase the risk for retention.    He states frustration with his symptoms and requests something to \"fix\" his issues.    I discussed realistic expectations for his case and that without known etiology of symptoms then I am unsure how to alleviate his issues especially given that he has failed to f/u for over 2 years and is no longer on the same regimen that he had been on previously.      Patient is agreeable to proceed with a cystoscopy at this time we will plan for follow-up in 3 months to evaluate urinary symptoms        I spent 10 minutes caring for Lester on this date of service. This time includes time spent by me in the following activities:reviewing tests, obtaining and/or reviewing a separately obtained history, performing a medically appropriate examination and/or evaluation , counseling and educating the patient/family/caregiver, ordering medications, tests, or procedures, and documenting information in the medical record  Follow Up   Return for With Dr. Chowdhury For Cystoscopy, 3 months with me.  Patient was given instructions and counseling regarding his condition or for health maintenance advice. Please see specific information pulled into the AVS " if appropriate.         This document has been electronically signed by HUE Robles  July 28, 2025 14:00 EDT

## (undated) DEVICE — STERILE POLYISOPRENE POWDER-FREE SURGICAL GLOVES: Brand: PROTEXIS

## (undated) DEVICE — STERILE POLYISOPRENE POWDER-FREE SURGICAL GLOVES WITH EMOLLIENT COATING: Brand: PROTEXIS

## (undated) DEVICE — SOLIDIFIER LIQLOC PLS 1500CC BT

## (undated) DEVICE — Device: Brand: DEFENDO AIR/WATER/SUCTION AND BIOPSY VALVE

## (undated) DEVICE — PAD GRND REM POLYHESIVE A/ DISP

## (undated) DEVICE — Device

## (undated) DEVICE — Device: Brand: BLACK EYE

## (undated) DEVICE — THE SINGLE USE ETRAP – POLYP TRAP IS USED FOR SUCTION RETRIEVAL OF ENDOSCOPICALLY REMOVED POLYPS.: Brand: ETRAP

## (undated) DEVICE — BLCK/BITE BLOX WO/DENTL/RIM W/STRAP 54F

## (undated) DEVICE — SOL IRR H2O BTL 1000ML STRL

## (undated) DEVICE — SNAR E/S POLYP SNAREMASTER OVL/10MM 2.8X2300MM YEL

## (undated) DEVICE — LINER SURG CANSTR SXN S/RIGD 1500CC

## (undated) DEVICE — SINGLE-USE BIOPSY FORCEPS: Brand: RADIAL JAW 4

## (undated) DEVICE — CONN JET HYDRA H20 AUXILIARY DISP

## (undated) DEVICE — TUBING, SUCTION, 1/4" X 10', STRAIGHT: Brand: MEDLINE

## (undated) DEVICE — SOL IRRG H2O PL/BG 1000ML STRL